# Patient Record
Sex: FEMALE | Race: WHITE | Employment: UNEMPLOYED | URBAN - METROPOLITAN AREA
[De-identification: names, ages, dates, MRNs, and addresses within clinical notes are randomized per-mention and may not be internally consistent; named-entity substitution may affect disease eponyms.]

---

## 2018-03-03 ENCOUNTER — HOSPITAL ENCOUNTER (OUTPATIENT)
Dept: MAMMOGRAPHY | Age: 53
Discharge: HOME | End: 2018-03-03
Attending: FAMILY MEDICINE | Admitting: FAMILY MEDICINE

## 2018-03-03 DIAGNOSIS — Z12.31 ENCOUNTER FOR SCREENING MAMMOGRAM FOR BREAST CANCER: ICD-10-CM

## 2018-10-14 ENCOUNTER — APPOINTMENT (OUTPATIENT)
Dept: CT IMAGING | Age: 53
DRG: 005 | End: 2018-10-14
Payer: COMMERCIAL

## 2018-10-14 ENCOUNTER — APPOINTMENT (OUTPATIENT)
Dept: GENERAL RADIOLOGY | Age: 53
DRG: 005 | End: 2018-10-14
Payer: COMMERCIAL

## 2018-10-14 ENCOUNTER — HOSPITAL ENCOUNTER (INPATIENT)
Age: 53
LOS: 16 days | Discharge: NURSING, REHAB OR LONG-TERM CARE | DRG: 005 | End: 2018-10-30
Attending: EMERGENCY MEDICINE | Admitting: INTERNAL MEDICINE
Payer: COMMERCIAL

## 2018-10-14 DIAGNOSIS — A41.9 SEPTIC SHOCK (HCC): ICD-10-CM

## 2018-10-14 DIAGNOSIS — I46.9 CARDIAC ARREST (HCC): ICD-10-CM

## 2018-10-14 DIAGNOSIS — J96.91 RESPIRATORY FAILURE WITH HYPOXIA AND HYPERCAPNIA, UNSPECIFIED CHRONICITY (HCC): Primary | ICD-10-CM

## 2018-10-14 DIAGNOSIS — J96.92 RESPIRATORY FAILURE WITH HYPOXIA AND HYPERCAPNIA, UNSPECIFIED CHRONICITY (HCC): Primary | ICD-10-CM

## 2018-10-14 DIAGNOSIS — R65.21 SEPTIC SHOCK (HCC): ICD-10-CM

## 2018-10-14 PROBLEM — R09.2 RESPIRATORY ARREST (HCC): Status: ACTIVE | Noted: 2018-10-14

## 2018-10-14 PROBLEM — I49.01 CARDIAC ARREST WITH VENTRICULAR FIBRILLATION (HCC): Status: ACTIVE | Noted: 2018-10-14

## 2018-10-14 PROBLEM — R09.2 RESPIRATORY ARREST BEFORE CARDIAC ARREST (HCC): Status: ACTIVE | Noted: 2018-10-14

## 2018-10-14 PROBLEM — I47.20 V-TACH: Status: ACTIVE | Noted: 2018-10-14

## 2018-10-14 LAB
A/G RATIO: 1.3 (ref 1.1–2.2)
ALBUMIN SERPL-MCNC: 4.2 G/DL (ref 3.4–5)
ALP BLD-CCNC: 87 U/L (ref 40–129)
ALT SERPL-CCNC: 46 U/L (ref 10–40)
AMORPHOUS: ABNORMAL /HPF
AMPHETAMINE SCREEN, URINE: NORMAL
ANION GAP SERPL CALCULATED.3IONS-SCNC: 18 MMOL/L (ref 3–16)
AST SERPL-CCNC: 72 U/L (ref 15–37)
BACTERIA: ABNORMAL /HPF
BANDED NEUTROPHILS RELATIVE PERCENT: 8 % (ref 0–7)
BARBITURATE SCREEN URINE: NORMAL
BASE EXCESS ARTERIAL: -2 (ref -3–3)
BASE EXCESS ARTERIAL: -7.2 MMOL/L (ref -3–3)
BASE EXCESS VENOUS: -9.8 MMOL/L (ref -3–3)
BASOPHILS ABSOLUTE: 0 K/UL (ref 0–0.2)
BASOPHILS RELATIVE PERCENT: 0 %
BENZODIAZEPINE SCREEN, URINE: NORMAL
BILIRUB SERPL-MCNC: 0.3 MG/DL (ref 0–1)
BILIRUBIN URINE: NEGATIVE
BLOOD, URINE: ABNORMAL
BUN BLDV-MCNC: 13 MG/DL (ref 7–20)
CALCIUM SERPL-MCNC: 9.4 MG/DL (ref 8.3–10.6)
CANNABINOID SCREEN URINE: NORMAL
CARBOXYHEMOGLOBIN ARTERIAL: 2.3 % (ref 0–1.5)
CARBOXYHEMOGLOBIN: 6.5 % (ref 0–1.5)
CASTS: ABNORMAL /LPF
CHLORIDE BLD-SCNC: 103 MMOL/L (ref 99–110)
CLARITY: CLEAR
CO2: 19 MMOL/L (ref 21–32)
COCAINE METABOLITE SCREEN URINE: NORMAL
COLOR: YELLOW
CREAT SERPL-MCNC: 0.7 MG/DL (ref 0.6–1.1)
EKG ATRIAL RATE: 137 BPM
EKG ATRIAL RATE: 99 BPM
EKG DIAGNOSIS: NORMAL
EKG DIAGNOSIS: NORMAL
EKG P AXIS: 47 DEGREES
EKG P AXIS: 69 DEGREES
EKG P-R INTERVAL: 150 MS
EKG P-R INTERVAL: 164 MS
EKG Q-T INTERVAL: 274 MS
EKG Q-T INTERVAL: 382 MS
EKG QRS DURATION: 108 MS
EKG QRS DURATION: 94 MS
EKG QTC CALCULATION (BAZETT): 413 MS
EKG QTC CALCULATION (BAZETT): 490 MS
EKG R AXIS: -50 DEGREES
EKG R AXIS: 115 DEGREES
EKG T AXIS: 94 DEGREES
EKG T AXIS: 94 DEGREES
EKG VENTRICULAR RATE: 137 BPM
EKG VENTRICULAR RATE: 99 BPM
EOSINOPHILS ABSOLUTE: 0.5 K/UL (ref 0–0.6)
EOSINOPHILS RELATIVE PERCENT: 2 %
ETHANOL: NORMAL MG/DL (ref 0–0.08)
GFR AFRICAN AMERICAN: >60
GFR NON-AFRICAN AMERICAN: >60
GLOBULIN: 3.2 G/DL
GLUCOSE BLD-MCNC: 110 MG/DL (ref 70–99)
GLUCOSE BLD-MCNC: 126 MG/DL (ref 70–99)
GLUCOSE BLD-MCNC: 227 MG/DL (ref 70–99)
GLUCOSE URINE: 250 MG/DL
HCO3 ARTERIAL: 21 MMOL/L (ref 21–29)
HCO3 ARTERIAL: 23.9 MMOL/L (ref 21–29)
HCO3 VENOUS: 19.3 MMOL/L (ref 23–29)
HCT VFR BLD CALC: 44.5 % (ref 36–48)
HEMATOLOGY PATH CONSULT: YES
HEMOGLOBIN, ART, EXTENDED: 14.5 G/DL (ref 12–16)
HEMOGLOBIN: 15 G/DL (ref 12–16)
INR BLD: 0.95 (ref 0.86–1.14)
KETONES, URINE: NEGATIVE MG/DL
LACTIC ACID, SEPSIS: 1.9 MMOL/L (ref 0.4–1.9)
LACTIC ACID, SEPSIS: 2.1 MMOL/L (ref 0.4–1.9)
LACTIC ACID: 5.2 MMOL/L (ref 0.4–2)
LEUKOCYTE ESTERASE, URINE: NEGATIVE
LYMPHOCYTES ABSOLUTE: 10.3 K/UL (ref 1–5.1)
LYMPHOCYTES RELATIVE PERCENT: 41 %
Lab: NORMAL
MCH RBC QN AUTO: 33.5 PG (ref 26–34)
MCHC RBC AUTO-ENTMCNC: 33.7 G/DL (ref 31–36)
MCV RBC AUTO: 99.4 FL (ref 80–100)
METAMYELOCYTES RELATIVE PERCENT: 1 %
METHADONE SCREEN, URINE: NORMAL
METHEMOGLOBIN ARTERIAL: 0.6 %
METHEMOGLOBIN VENOUS: 0.4 %
MICROSCOPIC EXAMINATION: YES
MONOCYTES ABSOLUTE: 0.8 K/UL (ref 0–1.3)
MONOCYTES RELATIVE PERCENT: 3 %
NEUTROPHILS ABSOLUTE: 13.6 K/UL (ref 1.7–7.7)
NEUTROPHILS RELATIVE PERCENT: 45 %
NITRITE, URINE: NEGATIVE
O2 CONTENT ARTERIAL: 19 ML/DL
O2 CONTENT, VEN: 21 VOL %
O2 SAT, ARTERIAL: 95.9 %
O2 SAT, ARTERIAL: 96 % (ref 93–100)
O2 SAT, VEN: 98 %
O2 THERAPY: ABNORMAL
O2 THERAPY: ABNORMAL
OPIATE SCREEN URINE: NORMAL
OXYCODONE URINE: NORMAL
PCO2 ARTERIAL: 47.7 MM HG (ref 35–45)
PCO2 ARTERIAL: 53 MMHG (ref 35–45)
PCO2, VEN: 54.7 MMHG (ref 40–50)
PDW BLD-RTO: 14.5 % (ref 12.4–15.4)
PERFORMED ON: ABNORMAL
PERFORMED ON: ABNORMAL
PH ARTERIAL: 7.22 (ref 7.35–7.45)
PH ARTERIAL: 7.31 (ref 7.35–7.45)
PH UA: 6
PH UA: 6
PH VENOUS: 7.17 (ref 7.35–7.45)
PHENCYCLIDINE SCREEN URINE: NORMAL
PLATELET # BLD: 348 K/UL (ref 135–450)
PMV BLD AUTO: 8.9 FL (ref 5–10.5)
PO2 ARTERIAL: 87.2 MMHG (ref 75–108)
PO2 ARTERIAL: 90.7 MM HG (ref 75–108)
PO2, VEN: 133.3 MMHG (ref 25–40)
POC SAMPLE TYPE: ABNORMAL
POTASSIUM SERPL-SCNC: 4.2 MMOL/L (ref 3.5–5.1)
PROPOXYPHENE SCREEN: NORMAL
PROTEIN UA: >=300 MG/DL
PROTHROMBIN TIME: 10.8 SEC (ref 9.8–13)
RBC # BLD: 4.48 M/UL (ref 4–5.2)
RBC # BLD: NORMAL 10*6/UL
RBC UA: ABNORMAL /HPF (ref 0–2)
SODIUM BLD-SCNC: 140 MMOL/L (ref 136–145)
SPECIFIC GRAVITY UA: >=1.03
TCO2 ARTERIAL: 22.6 MMOL/L
TCO2 ARTERIAL: 25 MMOL/L
TCO2 CALC VENOUS: 21 MMOL/L
TOTAL PROTEIN: 7.4 G/DL (ref 6.4–8.2)
TROPONIN: <0.01 NG/ML
URINE REFLEX TO CULTURE: ABNORMAL
URINE TYPE: ABNORMAL
UROBILINOGEN, URINE: 0.2 E.U./DL
WBC # BLD: 25.2 K/UL (ref 4–11)
WBC UA: ABNORMAL /HPF (ref 0–5)

## 2018-10-14 PROCEDURE — 36620 INSERTION CATHETER ARTERY: CPT

## 2018-10-14 PROCEDURE — 80307 DRUG TEST PRSMV CHEM ANLYZR: CPT

## 2018-10-14 PROCEDURE — 31500 INSERT EMERGENCY AIRWAY: CPT

## 2018-10-14 PROCEDURE — 6370000000 HC RX 637 (ALT 250 FOR IP): Performed by: INTERNAL MEDICINE

## 2018-10-14 PROCEDURE — 2500000003 HC RX 250 WO HCPCS: Performed by: INTERNAL MEDICINE

## 2018-10-14 PROCEDURE — 36415 COLL VENOUS BLD VENIPUNCTURE: CPT

## 2018-10-14 PROCEDURE — 94640 AIRWAY INHALATION TREATMENT: CPT

## 2018-10-14 PROCEDURE — 94002 VENT MGMT INPAT INIT DAY: CPT

## 2018-10-14 PROCEDURE — 82803 BLOOD GASES ANY COMBINATION: CPT

## 2018-10-14 PROCEDURE — 87040 BLOOD CULTURE FOR BACTERIA: CPT

## 2018-10-14 PROCEDURE — 2580000003 HC RX 258: Performed by: EMERGENCY MEDICINE

## 2018-10-14 PROCEDURE — 71045 X-RAY EXAM CHEST 1 VIEW: CPT

## 2018-10-14 PROCEDURE — 85025 COMPLETE CBC W/AUTO DIFF WBC: CPT

## 2018-10-14 PROCEDURE — 2700000000 HC OXYGEN THERAPY PER DAY

## 2018-10-14 PROCEDURE — 6360000002 HC RX W HCPCS: Performed by: EMERGENCY MEDICINE

## 2018-10-14 PROCEDURE — 93005 ELECTROCARDIOGRAM TRACING: CPT | Performed by: INTERNAL MEDICINE

## 2018-10-14 PROCEDURE — 2580000003 HC RX 258: Performed by: INTERNAL MEDICINE

## 2018-10-14 PROCEDURE — 99291 CRITICAL CARE FIRST HOUR: CPT | Performed by: INTERNAL MEDICINE

## 2018-10-14 PROCEDURE — 94770 HC ETCO2 MONITOR DAILY: CPT

## 2018-10-14 PROCEDURE — 84484 ASSAY OF TROPONIN QUANT: CPT

## 2018-10-14 PROCEDURE — 82947 ASSAY GLUCOSE BLOOD QUANT: CPT

## 2018-10-14 PROCEDURE — 80053 COMPREHEN METABOLIC PANEL: CPT

## 2018-10-14 PROCEDURE — 99255 IP/OBS CONSLTJ NEW/EST HI 80: CPT | Performed by: INTERNAL MEDICINE

## 2018-10-14 PROCEDURE — 36620 INSERTION CATHETER ARTERY: CPT | Performed by: INTERNAL MEDICINE

## 2018-10-14 PROCEDURE — 74018 RADEX ABDOMEN 1 VIEW: CPT

## 2018-10-14 PROCEDURE — 85610 PROTHROMBIN TIME: CPT

## 2018-10-14 PROCEDURE — 70450 CT HEAD/BRAIN W/O DYE: CPT

## 2018-10-14 PROCEDURE — 93010 ELECTROCARDIOGRAM REPORT: CPT | Performed by: INTERNAL MEDICINE

## 2018-10-14 PROCEDURE — G0480 DRUG TEST DEF 1-7 CLASSES: HCPCS

## 2018-10-14 PROCEDURE — 6360000002 HC RX W HCPCS: Performed by: INTERNAL MEDICINE

## 2018-10-14 PROCEDURE — 72125 CT NECK SPINE W/O DYE: CPT

## 2018-10-14 PROCEDURE — 99285 EMERGENCY DEPT VISIT HI MDM: CPT

## 2018-10-14 PROCEDURE — 36556 INSERT NON-TUNNEL CV CATH: CPT | Performed by: INTERNAL MEDICINE

## 2018-10-14 PROCEDURE — 94761 N-INVAS EAR/PLS OXIMETRY MLT: CPT

## 2018-10-14 PROCEDURE — 94750 HC PULMONARY COMPLIANCE STUDY: CPT

## 2018-10-14 PROCEDURE — 36556 INSERT NON-TUNNEL CV CATH: CPT

## 2018-10-14 PROCEDURE — 2000000000 HC ICU R&B

## 2018-10-14 PROCEDURE — 81001 URINALYSIS AUTO W/SCOPE: CPT

## 2018-10-14 PROCEDURE — 02HV33Z INSERTION OF INFUSION DEVICE INTO SUPERIOR VENA CAVA, PERCUTANEOUS APPROACH: ICD-10-PCS | Performed by: INTERNAL MEDICINE

## 2018-10-14 PROCEDURE — 51702 INSERT TEMP BLADDER CATH: CPT

## 2018-10-14 PROCEDURE — 0BH17EZ INSERTION OF ENDOTRACHEAL AIRWAY INTO TRACHEA, VIA NATURAL OR ARTIFICIAL OPENING: ICD-10-PCS | Performed by: EMERGENCY MEDICINE

## 2018-10-14 PROCEDURE — 2580000003 HC RX 258

## 2018-10-14 PROCEDURE — 83605 ASSAY OF LACTIC ACID: CPT

## 2018-10-14 PROCEDURE — 5A1955Z RESPIRATORY VENTILATION, GREATER THAN 96 CONSECUTIVE HOURS: ICD-10-PCS | Performed by: INTERNAL MEDICINE

## 2018-10-14 RX ORDER — POTASSIUM CHLORIDE 20 MEQ/1
40 TABLET, EXTENDED RELEASE ORAL PRN
Status: DISCONTINUED | OUTPATIENT
Start: 2018-10-14 | End: 2018-10-30 | Stop reason: HOSPADM

## 2018-10-14 RX ORDER — PROPOFOL 10 MG/ML
10 INJECTION, EMULSION INTRAVENOUS
Status: DISCONTINUED | OUTPATIENT
Start: 2018-10-14 | End: 2018-10-14

## 2018-10-14 RX ORDER — SODIUM CHLORIDE 9 MG/ML
INJECTION, SOLUTION INTRAVENOUS
Status: COMPLETED
Start: 2018-10-14 | End: 2018-10-14

## 2018-10-14 RX ORDER — MAGNESIUM SULFATE 1 G/100ML
1 INJECTION INTRAVENOUS PRN
Status: DISCONTINUED | OUTPATIENT
Start: 2018-10-14 | End: 2018-10-30 | Stop reason: HOSPADM

## 2018-10-14 RX ORDER — FENTANYL CITRATE 50 UG/ML
100 INJECTION, SOLUTION INTRAMUSCULAR; INTRAVENOUS ONCE
Status: COMPLETED | OUTPATIENT
Start: 2018-10-14 | End: 2018-10-14

## 2018-10-14 RX ORDER — SODIUM CHLORIDE 0.9 % (FLUSH) 0.9 %
10 SYRINGE (ML) INJECTION PRN
Status: DISCONTINUED | OUTPATIENT
Start: 2018-10-14 | End: 2018-10-14

## 2018-10-14 RX ORDER — ONDANSETRON 2 MG/ML
4 INJECTION INTRAMUSCULAR; INTRAVENOUS EVERY 6 HOURS PRN
Status: DISCONTINUED | OUTPATIENT
Start: 2018-10-14 | End: 2018-10-30 | Stop reason: HOSPADM

## 2018-10-14 RX ORDER — SODIUM CHLORIDE 0.9 % (FLUSH) 0.9 %
10 SYRINGE (ML) INJECTION PRN
Status: DISCONTINUED | OUTPATIENT
Start: 2018-10-14 | End: 2018-10-20 | Stop reason: SDUPTHER

## 2018-10-14 RX ORDER — PROPOFOL 10 MG/ML
10 INJECTION, EMULSION INTRAVENOUS
Status: DISCONTINUED | OUTPATIENT
Start: 2018-10-14 | End: 2018-10-17

## 2018-10-14 RX ORDER — IPRATROPIUM BROMIDE AND ALBUTEROL SULFATE 2.5; .5 MG/3ML; MG/3ML
1 SOLUTION RESPIRATORY (INHALATION) 4 TIMES DAILY
Status: DISCONTINUED | OUTPATIENT
Start: 2018-10-14 | End: 2018-10-17

## 2018-10-14 RX ORDER — MIDAZOLAM HYDROCHLORIDE 1 MG/ML
5 INJECTION INTRAMUSCULAR; INTRAVENOUS ONCE
Status: DISCONTINUED | OUTPATIENT
Start: 2018-10-14 | End: 2018-10-14 | Stop reason: CLARIF

## 2018-10-14 RX ORDER — SODIUM CHLORIDE 0.9 % (FLUSH) 0.9 %
10 SYRINGE (ML) INJECTION EVERY 12 HOURS SCHEDULED
Status: DISCONTINUED | OUTPATIENT
Start: 2018-10-14 | End: 2018-10-14

## 2018-10-14 RX ORDER — SODIUM CHLORIDE 9 MG/ML
INJECTION, SOLUTION INTRAVENOUS
Status: DISPENSED
Start: 2018-10-14 | End: 2018-10-14

## 2018-10-14 RX ORDER — SODIUM CHLORIDE 0.9 % (FLUSH) 0.9 %
10 SYRINGE (ML) INJECTION EVERY 12 HOURS SCHEDULED
Status: DISCONTINUED | OUTPATIENT
Start: 2018-10-14 | End: 2018-10-20 | Stop reason: SDUPTHER

## 2018-10-14 RX ORDER — 0.9 % SODIUM CHLORIDE 0.9 %
30 INTRAVENOUS SOLUTION INTRAVENOUS ONCE
Status: COMPLETED | OUTPATIENT
Start: 2018-10-14 | End: 2018-10-14

## 2018-10-14 RX ORDER — METHYLPREDNISOLONE SODIUM SUCCINATE 40 MG/ML
40 INJECTION, POWDER, LYOPHILIZED, FOR SOLUTION INTRAMUSCULAR; INTRAVENOUS DAILY
Status: DISCONTINUED | OUTPATIENT
Start: 2018-10-14 | End: 2018-10-15

## 2018-10-14 RX ORDER — CHLORHEXIDINE GLUCONATE 0.12 MG/ML
15 RINSE ORAL 2 TIMES DAILY
Status: DISCONTINUED | OUTPATIENT
Start: 2018-10-14 | End: 2018-10-17

## 2018-10-14 RX ORDER — POTASSIUM CHLORIDE 7.45 MG/ML
10 INJECTION INTRAVENOUS PRN
Status: DISCONTINUED | OUTPATIENT
Start: 2018-10-14 | End: 2018-10-30 | Stop reason: HOSPADM

## 2018-10-14 RX ORDER — POTASSIUM CHLORIDE 20MEQ/15ML
40 LIQUID (ML) ORAL PRN
Status: DISCONTINUED | OUTPATIENT
Start: 2018-10-14 | End: 2018-10-30 | Stop reason: HOSPADM

## 2018-10-14 RX ADMIN — VANCOMYCIN HYDROCHLORIDE 1.5 G: 10 INJECTION, POWDER, LYOPHILIZED, FOR SOLUTION INTRAVENOUS at 05:30

## 2018-10-14 RX ADMIN — SODIUM CHLORIDE 250 ML: 9 INJECTION, SOLUTION INTRAVENOUS at 07:43

## 2018-10-14 RX ADMIN — FENTANYL CITRATE 25 MCG/HR: 50 INJECTION INTRAVENOUS at 10:01

## 2018-10-14 RX ADMIN — CHLORHEXIDINE GLUCONATE 15 ML: 1.2 RINSE ORAL at 09:22

## 2018-10-14 RX ADMIN — ENOXAPARIN SODIUM 40 MG: 40 INJECTION SUBCUTANEOUS at 07:42

## 2018-10-14 RX ADMIN — NOREPINEPHRINE BITARTRATE 4 MCG/MIN: 1 INJECTION INTRAVENOUS at 19:20

## 2018-10-14 RX ADMIN — PROPOFOL 40 MCG/KG/MIN: 10 INJECTION, EMULSION INTRAVENOUS at 14:42

## 2018-10-14 RX ADMIN — MUPIROCIN: 20 OINTMENT TOPICAL at 20:17

## 2018-10-14 RX ADMIN — PROPOFOL 30 MCG/KG/MIN: 10 INJECTION, EMULSION INTRAVENOUS at 06:15

## 2018-10-14 RX ADMIN — DOXYCYCLINE 100 MG: 100 INJECTION, POWDER, LYOPHILIZED, FOR SOLUTION INTRAVENOUS at 08:37

## 2018-10-14 RX ADMIN — PROPOFOL 50 MCG/KG/MIN: 10 INJECTION, EMULSION INTRAVENOUS at 21:24

## 2018-10-14 RX ADMIN — PROPOFOL 50 MCG/KG/MIN: 10 INJECTION, EMULSION INTRAVENOUS at 21:22

## 2018-10-14 RX ADMIN — Medication 10 ML: at 20:17

## 2018-10-14 RX ADMIN — Medication 10 ML: at 07:42

## 2018-10-14 RX ADMIN — MIDAZOLAM 6 MG/HR: 5 INJECTION INTRAMUSCULAR; INTRAVENOUS at 05:33

## 2018-10-14 RX ADMIN — CHLORHEXIDINE GLUCONATE 15 ML: 1.2 RINSE ORAL at 20:17

## 2018-10-14 RX ADMIN — SODIUM CHLORIDE 2994 ML: 9 INJECTION, SOLUTION INTRAVENOUS at 05:10

## 2018-10-14 RX ADMIN — IPRATROPIUM BROMIDE AND ALBUTEROL SULFATE 1 AMPULE: .5; 3 SOLUTION RESPIRATORY (INHALATION) at 15:59

## 2018-10-14 RX ADMIN — MUPIROCIN: 20 OINTMENT TOPICAL at 09:06

## 2018-10-14 RX ADMIN — IPRATROPIUM BROMIDE AND ALBUTEROL SULFATE 1 AMPULE: .5; 3 SOLUTION RESPIRATORY (INHALATION) at 20:50

## 2018-10-14 RX ADMIN — MIDAZOLAM 2 MG/HR: 5 INJECTION INTRAMUSCULAR; INTRAVENOUS at 05:01

## 2018-10-14 RX ADMIN — PIPERACILLIN SODIUM,TAZOBACTAM SODIUM 3.38 G: 3; .375 INJECTION, POWDER, FOR SOLUTION INTRAVENOUS at 07:42

## 2018-10-14 RX ADMIN — METHYLPREDNISOLONE SODIUM SUCCINATE 40 MG: 40 INJECTION, POWDER, FOR SOLUTION INTRAMUSCULAR; INTRAVENOUS at 09:51

## 2018-10-14 RX ADMIN — FENTANYL CITRATE 100 MCG/HR: 50 INJECTION INTRAVENOUS at 20:18

## 2018-10-14 RX ADMIN — PROPOFOL 45 MCG/KG/MIN: 10 INJECTION, EMULSION INTRAVENOUS at 10:33

## 2018-10-14 RX ADMIN — FENTANYL CITRATE 100 MCG: 50 INJECTION, SOLUTION INTRAMUSCULAR; INTRAVENOUS at 04:10

## 2018-10-14 RX ADMIN — IPRATROPIUM BROMIDE AND ALBUTEROL SULFATE 1 AMPULE: .5; 3 SOLUTION RESPIRATORY (INHALATION) at 11:58

## 2018-10-14 RX ADMIN — PROPOFOL 45 MCG/KG/MIN: 10 INJECTION, EMULSION INTRAVENOUS at 18:00

## 2018-10-14 RX ADMIN — DOXYCYCLINE 100 MG: 100 INJECTION, POWDER, LYOPHILIZED, FOR SOLUTION INTRAVENOUS at 20:16

## 2018-10-14 ASSESSMENT — PULMONARY FUNCTION TESTS
PIF_VALUE: 23
PIF_VALUE: 40
PIF_VALUE: 22
PIF_VALUE: 19
PIF_VALUE: 18
PIF_VALUE: 22
PIF_VALUE: 18
PIF_VALUE: 20
PIF_VALUE: 21
PIF_VALUE: 20
PIF_VALUE: 21
PIF_VALUE: 14
PIF_VALUE: 23
PIF_VALUE: 22
PIF_VALUE: 20
PIF_VALUE: 20

## 2018-10-14 ASSESSMENT — PAIN SCALES - GENERAL
PAINLEVEL_OUTOF10: 0

## 2018-10-14 NOTE — CONSULTS
PULMONARY AND CRITICAL CARE INPATIENT CONSULTATION      10/14/2018    Patient Name:  Andi Erwin       1965       Evaluation was requested by Dr. Sara Schneider regarding: intubated, respiratory arrest/V. fib arrest.    HPI: Patient is a 46 y.o. female who was admitted with respiratory arrest and V. fib arrest.     Chepe Gardner is a 39-year-old female living with her  and family. She has 3 children. The patient is a homemaker. She is a heavy smoker, smokes up to 2 PPD, did quit for 10 years in between. She does not drink alcohol. She has had major back surgery with rods in her spine for more than 20 years, is on pain medication. She has limited ambulation due to her back issues, but does drive independently, goes where she wants to. She also takes 8-10 Tylenol tablets a day in addition to her pain medications. She has had long-standing asthma, uses inhalers through her PCP, has not seen a pulmonologist. She has had ER and urgent care visits for bronchodilator therapy, has never been hospitalized for asthma. She does not have allergic rhinitis or GERD. She also has hypertension and hyperlipidemia. The patient has high levels of anxiety, with trichotillomania. She occasionally has diarrhea alternating with constipation. There is no history of alcohol or illicit drug use. There has been no weight loss, no  complaints according to her . The patient was in her usual state of health, though they could have been slightly increased shortness of breath. Her son did hear her using her inhaler, possibly twice. She is compliant with her MDIs. She does inform her family and her  about issues she that she has with her health. The patient woke her  up last night, and then fell down. He noted she was breathing. The squad was called. She did have a pulse in the squad arrived. She was having respiratory distress. A Lacho airway was being attempted when she lost her pulse.

## 2018-10-14 NOTE — H&P
every 6 hours as needed. Yes Historical Provider, MD   loratadine (CLARITIN) 10 MG tablet Take 10 mg by mouth daily. Yes Historical Provider, MD   amphetamine-dextroamphetamine (ADDERALL XR) 10 MG XR capsule Take 10 mg by mouth every morning. Yes Historical Provider, MD   clonazepam (KLONOPIN) 0.5 MG tablet Take 1 mg by mouth 3 times daily as needed. 1/18/11  Yes Historical Provider, MD   lisinopril (PRINIVIL;ZESTRIL) 20 MG tablet Take 20 mg by mouth daily. Yes Historical Provider, MD   simvastatin (ZOCOR) 20 MG tablet Take 20 mg by mouth nightly. Yes Historical Provider, MD   diphenoxylate-atropine (LOMOTIL) 2.5-0.025 MG per tablet Take 1 tablet by mouth 4 times daily as needed. Yes Historical Provider, MD   fluticasone-salmeterol (ADVAIR HFA) 230-21 MCG/ACT inhaler Inhale 2 puffs into the lungs 2 times daily. Yes Historical Provider, MD   albuterol (PROVENTIL HFA;VENTOLIN HFA) 108 (90 BASE) MCG/ACT inhaler Inhale 2 puffs into the lungs every 6 hours as needed. Yes Historical Provider, MD   NONFORMULARY Take 1 tablet by mouth daily \"I take a large white blood pressure pill\"    Historical Provider, MD   oxyCODONE-acetaminophen (PERCOCET)  MG per tablet Take 1 tablet by mouth every 4 hours as needed for Pain . Historical Provider, MD   UNKNOWN TO PATIENT Indications: takes med for depression unsure of name (topamil?)    Historical Provider, MD   paroxetine (PAXIL) 20 MG tablet Take 20 mg by mouth 2 times daily. 12/11/10   Historical Provider, MD       Allergies:  Aspirin; Nsaids; and Penicillins    Social History:      The patient currently lives at home    TOBACCO:   reports that she has been smoking Cigarettes. She has a 0.64 pack-year smoking history. She has never used smokeless tobacco.  ETOH:   reports that she does not drink alcohol. Family History:       Reviewed in detail and negative for DM, CAD, Cancer, CVA. Positive as follows:    No family history on file.     REVIEW OF [address] NEGATIVE 02/14/2011       Radiology:     EKG:  I have reviewed the EKG with the following interpretation: sinus tach, rate of 137,  Left axis, st/tw abn in lateral leads concerning for ischemia, nonspecific intraventricular conduction delay    XR CHEST PORTABLE   Final Result   Cardiomegaly with mild vascular indistinctness, either due to low lung   volumes or mild edema. Subtle retrocardiac opacity remains on the left         CT CERVICAL SPINE WO CONTRAST   Preliminary Result   Spondylosis with no definite fracture. CT HEAD WO CONTRAST   Preliminary Result   No acute intracranial abnormality. XR ABDOMEN (KUB) (SINGLE AP VIEW)   Final Result   Chest: The endotracheal tube tip is 1-2 cm above the rojas. The tube could   be withdrawn 1 cm. Abdomen: The enteric tube is in good position in the stomach which is   distended. XR CHEST PORTABLE   Final Result   Chest: The endotracheal tube tip is 1-2 cm above the rojas. The tube could   be withdrawn 1 cm. Abdomen: The enteric tube is in good position in the stomach which is   distended.          XR CHEST PORTABLE    (Results Pending)       ASSESSMENT:    Active Hospital Problems    Diagnosis Date Noted    Asthma [J45.909]      Priority: Low    Respiratory arrest ([address] [R09.2] 10/14/2018    V-tach ([address] [I47.2] 10/14/2018    Sepsis ([address] [A41.9] 10/14/2018    Respiratory arrest before cardiac arrest ([address] [I46.9, R09.2] 10/14/2018    Cardiac arrest with ventricular fibrillation ([address] [I46.9, I49.01] 10/14/2018    Respiratory failure with hypoxia and hypercapnia (HCC) [J96.91, J96.92]          PLAN:    Acute resp failure- unclear etiology, possibly from asthma or cardiac etiology  -intubated, mgmt per pulm/cc apprec recs  -vanc/zosyn x 1 given  -iv rocephin/doxycycline started for possible resp infection, adjust as needed    Cardiac arrest- possibly from hypoxia, but pt did also apparently have vfib, ekg did note some

## 2018-10-14 NOTE — PLAN OF CARE
Problem: Restraint Use - Nonviolent/Non-Self-Destructive Behavior:  Goal: Absence of restraint indications  Absence of restraint indications   Outcome: Ongoing  Patient still demonstrating indications of the need to be restrained at this time. Visual safety checks performed every hour, and restraint monitoring performed every two hours. Patient has no signs of injuries from restraints at this time. Educated pt on the need for the restraints, no evidence of learning. Will continue to monitor. Problem: Falls - Risk of:  Goal: Will remain free from falls  Will remain free from falls   Outcome: Ongoing  Fall risk assessment complete, fall precautions in place. Fall visuals posted, bed alarm on, bed in lowest position with wheels locked. Patient has been free of falls this shift, will continue to monitor. Problem: Risk for Impaired Skin Integrity  Goal: Tissue integrity - skin and mucous membranes  Structural intactness and normal physiological function of skin and  mucous membranes. Outcome: Ongoing  Skin assessment complete. Pt at risk for skin breakdown. See Luis Antonio score. Pt remains on bedrest. Unable to reposition self in bed. Heels elevated off bed. Will continue to turn and reposition patient every two hours and as needed. Will continue to keep patient clean and dry, applying skin care cream as needed. Pillows used for positioning. Will continue to monitor and assess for skin breakdown.

## 2018-10-14 NOTE — PROGRESS NOTES
Reassessment complete and documented on flowsheets, unchanged from previous. VSS. Repositioned for comfort. Will continue to monitor.

## 2018-10-14 NOTE — PROGRESS NOTES
4 Eyes Skin Assessment     The patient is being assess for   Shift Handoff    I agree that 2 RN's have performed a thorough Head to Toe Skin Assessment on the patient. ALL assessment sites listed below have been assessed. Areas assessed by both nurses:   [x]   Head, Face, and Ears   [x]   Shoulders, Back, and Chest, Abdomen  [x]   Arms, Elbows, and Hands   [x]   Coccyx, Sacrum, and Ischium  [x]   Legs, Feet, and Heels        **SHARE this note so that the co-signing nurse is able to place an eSignature**    Co-signer eSignature: Electronically signed by Issa Ireland RN on 10/14/18 at 8:53 AM    Does the Patient have Skin Breakdown?   No          Luis Antonio Prevention initiated:  Yes   Wound Care Orders initiated:  NA      [facility] nurse consulted for Pressure Injury (Stage 3,4, Unstageable, DTI, NWPT, Complex wounds)and New or Established Ostomies:  NA      Primary Nurse eSignature: TEJA CHURCHILL

## 2018-10-15 ENCOUNTER — APPOINTMENT (OUTPATIENT)
Dept: GENERAL RADIOLOGY | Age: 53
DRG: 005 | End: 2018-10-15
Payer: COMMERCIAL

## 2018-10-15 ENCOUNTER — APPOINTMENT (OUTPATIENT)
Dept: MRI IMAGING | Age: 53
DRG: 005 | End: 2018-10-15
Payer: COMMERCIAL

## 2018-10-15 LAB
A/G RATIO: 1.4 (ref 1.1–2.2)
ALBUMIN SERPL-MCNC: 3.4 G/DL (ref 3.4–5)
ALP BLD-CCNC: 59 U/L (ref 40–129)
ALT SERPL-CCNC: 32 U/L (ref 10–40)
ANION GAP SERPL CALCULATED.3IONS-SCNC: 10 MMOL/L (ref 3–16)
AST SERPL-CCNC: 26 U/L (ref 15–37)
BANDED NEUTROPHILS RELATIVE PERCENT: 1 % (ref 0–7)
BASE EXCESS ARTERIAL: -2 (ref -3–3)
BASE EXCESS ARTERIAL: -2 (ref -3–3)
BASE EXCESS ARTERIAL: -2.6 MMOL/L (ref -3–3)
BASOPHILS ABSOLUTE: 0 K/UL (ref 0–0.2)
BASOPHILS RELATIVE PERCENT: 0 %
BILIRUB SERPL-MCNC: <0.2 MG/DL (ref 0–1)
BUN BLDV-MCNC: 17 MG/DL (ref 7–20)
CALCIUM SERPL-MCNC: 8.6 MG/DL (ref 8.3–10.6)
CARBOXYHEMOGLOBIN ARTERIAL: 0.1 % (ref 0–1.5)
CHLORIDE BLD-SCNC: 108 MMOL/L (ref 99–110)
CO2: 23 MMOL/L (ref 21–32)
CREAT SERPL-MCNC: 0.7 MG/DL (ref 0.6–1.1)
EOSINOPHILS ABSOLUTE: 0 K/UL (ref 0–0.6)
EOSINOPHILS RELATIVE PERCENT: 0 %
GFR AFRICAN AMERICAN: >60
GFR NON-AFRICAN AMERICAN: >60
GLOBULIN: 2.5 G/DL
GLUCOSE BLD-MCNC: 109 MG/DL (ref 70–99)
GLUCOSE BLD-MCNC: 134 MG/DL (ref 70–99)
GLUCOSE BLD-MCNC: 163 MG/DL (ref 70–99)
HCO3 ARTERIAL: 23.8 MMOL/L (ref 21–29)
HCO3 ARTERIAL: 24 MMOL/L (ref 21–29)
HCO3 ARTERIAL: 25 MMOL/L (ref 21–29)
HCT VFR BLD CALC: 37.7 % (ref 36–48)
HEMATOLOGY PATH CONSULT: NORMAL
HEMOGLOBIN, ART, EXTENDED: 12.9 G/DL (ref 12–16)
HEMOGLOBIN: 12.6 G/DL (ref 12–16)
LV EF: 40 %
LVEF MODALITY: NORMAL
LYMPHOCYTES ABSOLUTE: 2.1 K/UL (ref 1–5.1)
LYMPHOCYTES RELATIVE PERCENT: 11 %
MCH RBC QN AUTO: 33.5 PG (ref 26–34)
MCHC RBC AUTO-ENTMCNC: 33.5 G/DL (ref 31–36)
MCV RBC AUTO: 100 FL (ref 80–100)
METHEMOGLOBIN ARTERIAL: 0.4 %
MONOCYTES ABSOLUTE: 0.9 K/UL (ref 0–1.3)
MONOCYTES RELATIVE PERCENT: 5 %
NEUTROPHILS ABSOLUTE: 15.8 K/UL (ref 1.7–7.7)
NEUTROPHILS RELATIVE PERCENT: 83 %
O2 CONTENT ARTERIAL: 18 ML/DL
O2 SAT, ARTERIAL: 92 % (ref 93–100)
O2 SAT, ARTERIAL: 93 % (ref 93–100)
O2 SAT, ARTERIAL: 97.7 %
O2 THERAPY: ABNORMAL
PCO2 ARTERIAL: 43.6 MM HG (ref 35–45)
PCO2 ARTERIAL: 48 MMHG (ref 35–45)
PCO2 ARTERIAL: 51.4 MM HG (ref 35–45)
PDW BLD-RTO: 14.5 % (ref 12.4–15.4)
PERFORMED ON: ABNORMAL
PERFORMED ON: ABNORMAL
PH ARTERIAL: 7.29 (ref 7.35–7.45)
PH ARTERIAL: 7.31 (ref 7.35–7.45)
PH ARTERIAL: 7.35 (ref 7.35–7.45)
PLATELET # BLD: 245 K/UL (ref 135–450)
PMV BLD AUTO: 8.4 FL (ref 5–10.5)
PO2 ARTERIAL: 114.3 MMHG (ref 75–108)
PO2 ARTERIAL: 65.8 MM HG (ref 75–108)
PO2 ARTERIAL: 76.6 MM HG (ref 75–108)
POC SAMPLE TYPE: ABNORMAL
POC SAMPLE TYPE: ABNORMAL
POTASSIUM REFLEX MAGNESIUM: 4.5 MMOL/L (ref 3.5–5.1)
RBC # BLD: 3.77 M/UL (ref 4–5.2)
SODIUM BLD-SCNC: 141 MMOL/L (ref 136–145)
TCO2 ARTERIAL: 25 MMOL/L
TCO2 ARTERIAL: 25.3 MMOL/L
TCO2 ARTERIAL: 27 MMOL/L
TOTAL PROTEIN: 5.9 G/DL (ref 6.4–8.2)
TROPONIN: <0.01 NG/ML
WBC # BLD: 18.8 K/UL (ref 4–11)

## 2018-10-15 PROCEDURE — 82803 BLOOD GASES ANY COMBINATION: CPT

## 2018-10-15 PROCEDURE — 2580000003 HC RX 258: Performed by: EMERGENCY MEDICINE

## 2018-10-15 PROCEDURE — 6360000002 HC RX W HCPCS: Performed by: INTERNAL MEDICINE

## 2018-10-15 PROCEDURE — 6360000002 HC RX W HCPCS

## 2018-10-15 PROCEDURE — 2580000003 HC RX 258: Performed by: INTERNAL MEDICINE

## 2018-10-15 PROCEDURE — 36592 COLLECT BLOOD FROM PICC: CPT

## 2018-10-15 PROCEDURE — 85025 COMPLETE CBC W/AUTO DIFF WBC: CPT

## 2018-10-15 PROCEDURE — 71045 X-RAY EXAM CHEST 1 VIEW: CPT

## 2018-10-15 PROCEDURE — 94770 HC ETCO2 MONITOR DAILY: CPT

## 2018-10-15 PROCEDURE — 2580000003 HC RX 258

## 2018-10-15 PROCEDURE — 80053 COMPREHEN METABOLIC PANEL: CPT

## 2018-10-15 PROCEDURE — 6370000000 HC RX 637 (ALT 250 FOR IP): Performed by: INTERNAL MEDICINE

## 2018-10-15 PROCEDURE — 2700000000 HC OXYGEN THERAPY PER DAY

## 2018-10-15 PROCEDURE — 93306 TTE W/DOPPLER COMPLETE: CPT

## 2018-10-15 PROCEDURE — 94640 AIRWAY INHALATION TREATMENT: CPT

## 2018-10-15 PROCEDURE — 2000000000 HC ICU R&B

## 2018-10-15 PROCEDURE — 99291 CRITICAL CARE FIRST HOUR: CPT | Performed by: INTERNAL MEDICINE

## 2018-10-15 PROCEDURE — 2500000003 HC RX 250 WO HCPCS: Performed by: INTERNAL MEDICINE

## 2018-10-15 PROCEDURE — 70553 MRI BRAIN STEM W/O & W/DYE: CPT

## 2018-10-15 PROCEDURE — 96374 THER/PROPH/DIAG INJ IV PUSH: CPT

## 2018-10-15 PROCEDURE — 94750 HC PULMONARY COMPLIANCE STUDY: CPT

## 2018-10-15 PROCEDURE — 84484 ASSAY OF TROPONIN QUANT: CPT

## 2018-10-15 PROCEDURE — 94761 N-INVAS EAR/PLS OXIMETRY MLT: CPT

## 2018-10-15 PROCEDURE — 6360000004 HC RX CONTRAST MEDICATION: Performed by: INTERNAL MEDICINE

## 2018-10-15 PROCEDURE — A9579 GAD-BASE MR CONTRAST NOS,1ML: HCPCS | Performed by: INTERNAL MEDICINE

## 2018-10-15 PROCEDURE — 94762 N-INVAS EAR/PLS OXIMTRY CONT: CPT

## 2018-10-15 PROCEDURE — 82947 ASSAY GLUCOSE BLOOD QUANT: CPT

## 2018-10-15 PROCEDURE — 94003 VENT MGMT INPAT SUBQ DAY: CPT

## 2018-10-15 PROCEDURE — 99292 CRITICAL CARE ADDL 30 MIN: CPT | Performed by: INTERNAL MEDICINE

## 2018-10-15 PROCEDURE — S0028 INJECTION, FAMOTIDINE, 20 MG: HCPCS | Performed by: INTERNAL MEDICINE

## 2018-10-15 PROCEDURE — 99233 SBSQ HOSP IP/OBS HIGH 50: CPT | Performed by: INTERNAL MEDICINE

## 2018-10-15 RX ORDER — SODIUM CHLORIDE 9 MG/ML
INJECTION, SOLUTION INTRAVENOUS
Status: COMPLETED
Start: 2018-10-15 | End: 2018-10-15

## 2018-10-15 RX ORDER — MIDAZOLAM HYDROCHLORIDE 5 MG/ML
5 INJECTION INTRAMUSCULAR; INTRAVENOUS ONCE
Status: COMPLETED | OUTPATIENT
Start: 2018-10-15 | End: 2018-10-15

## 2018-10-15 RX ORDER — METHYLPREDNISOLONE SODIUM SUCCINATE 40 MG/ML
40 INJECTION, POWDER, LYOPHILIZED, FOR SOLUTION INTRAMUSCULAR; INTRAVENOUS EVERY 6 HOURS
Status: DISCONTINUED | OUTPATIENT
Start: 2018-10-15 | End: 2018-10-17

## 2018-10-15 RX ORDER — MIDAZOLAM HYDROCHLORIDE 5 MG/ML
INJECTION INTRAMUSCULAR; INTRAVENOUS
Status: COMPLETED
Start: 2018-10-15 | End: 2018-10-15

## 2018-10-15 RX ORDER — CARBOXYMETHYLCELLULOSE SODIUM 10 MG/ML
1 GEL OPHTHALMIC 3 TIMES DAILY
Status: DISCONTINUED | OUTPATIENT
Start: 2018-10-15 | End: 2018-10-17

## 2018-10-15 RX ADMIN — GADOTERIDOL 20 ML: 279.3 INJECTION, SOLUTION INTRAVENOUS at 17:54

## 2018-10-15 RX ADMIN — Medication 10 ML: at 21:01

## 2018-10-15 RX ADMIN — PROPOFOL 45 MCG/KG/MIN: 10 INJECTION, EMULSION INTRAVENOUS at 01:51

## 2018-10-15 RX ADMIN — FAMOTIDINE 20 MG: 10 INJECTION, SOLUTION INTRAVENOUS at 21:02

## 2018-10-15 RX ADMIN — CEFTRIAXONE SODIUM 1 G: 1 INJECTION, POWDER, FOR SOLUTION INTRAMUSCULAR; INTRAVENOUS at 03:56

## 2018-10-15 RX ADMIN — METHYLPREDNISOLONE SODIUM SUCCINATE 40 MG: 40 INJECTION, POWDER, FOR SOLUTION INTRAMUSCULAR; INTRAVENOUS at 12:15

## 2018-10-15 RX ADMIN — FENTANYL CITRATE 100 MCG/HR: 50 INJECTION INTRAVENOUS at 23:49

## 2018-10-15 RX ADMIN — DEXMEDETOMIDINE HYDROCHLORIDE 1.4 MCG/KG/HR: 100 INJECTION, SOLUTION INTRAVENOUS at 19:00

## 2018-10-15 RX ADMIN — PROPOFOL 50 MCG/KG/MIN: 10 INJECTION, EMULSION INTRAVENOUS at 04:34

## 2018-10-15 RX ADMIN — METHYLPREDNISOLONE SODIUM SUCCINATE 40 MG: 40 INJECTION, POWDER, FOR SOLUTION INTRAMUSCULAR; INTRAVENOUS at 18:53

## 2018-10-15 RX ADMIN — SODIUM CHLORIDE 250 ML: 9 INJECTION, SOLUTION INTRAVENOUS at 18:53

## 2018-10-15 RX ADMIN — MUPIROCIN: 20 OINTMENT TOPICAL at 21:01

## 2018-10-15 RX ADMIN — CARBOXYMETHYLCELLULOSE SODIUM 1 DROP: 10 GEL OPHTHALMIC at 21:02

## 2018-10-15 RX ADMIN — CHLORHEXIDINE GLUCONATE 15 ML: 1.2 RINSE ORAL at 07:44

## 2018-10-15 RX ADMIN — ENOXAPARIN SODIUM 40 MG: 40 INJECTION SUBCUTANEOUS at 07:44

## 2018-10-15 RX ADMIN — DEXMEDETOMIDINE HYDROCHLORIDE 1.4 MCG/KG/HR: 100 INJECTION, SOLUTION INTRAVENOUS at 23:23

## 2018-10-15 RX ADMIN — DEXMEDETOMIDINE HYDROCHLORIDE 0.2 MCG/KG/HR: 100 INJECTION, SOLUTION INTRAVENOUS at 09:38

## 2018-10-15 RX ADMIN — MIDAZOLAM HYDROCHLORIDE 5 MG: 5 INJECTION, SOLUTION INTRAMUSCULAR; INTRAVENOUS at 14:55

## 2018-10-15 RX ADMIN — IPRATROPIUM BROMIDE AND ALBUTEROL SULFATE 1 AMPULE: .5; 3 SOLUTION RESPIRATORY (INHALATION) at 08:28

## 2018-10-15 RX ADMIN — DOXYCYCLINE 100 MG: 100 INJECTION, POWDER, LYOPHILIZED, FOR SOLUTION INTRAVENOUS at 18:53

## 2018-10-15 RX ADMIN — FAMOTIDINE 20 MG: 10 INJECTION, SOLUTION INTRAVENOUS at 09:51

## 2018-10-15 RX ADMIN — IPRATROPIUM BROMIDE AND ALBUTEROL SULFATE 1 AMPULE: .5; 3 SOLUTION RESPIRATORY (INHALATION) at 15:46

## 2018-10-15 RX ADMIN — DEXMEDETOMIDINE HYDROCHLORIDE 1.4 MCG/KG/HR: 100 INJECTION, SOLUTION INTRAVENOUS at 20:11

## 2018-10-15 RX ADMIN — PROPOFOL 50 MCG/KG/MIN: 10 INJECTION, EMULSION INTRAVENOUS at 07:50

## 2018-10-15 RX ADMIN — MIDAZOLAM HYDROCHLORIDE 5 MG: 5 INJECTION INTRAMUSCULAR; INTRAVENOUS at 14:55

## 2018-10-15 RX ADMIN — MUPIROCIN: 20 OINTMENT TOPICAL at 07:44

## 2018-10-15 RX ADMIN — SODIUM CHLORIDE 500 ML: 9 INJECTION, SOLUTION INTRAVENOUS at 18:53

## 2018-10-15 RX ADMIN — IPRATROPIUM BROMIDE AND ALBUTEROL SULFATE 1 AMPULE: .5; 3 SOLUTION RESPIRATORY (INHALATION) at 11:15

## 2018-10-15 RX ADMIN — PROPOFOL 25 MCG/KG/MIN: 10 INJECTION, EMULSION INTRAVENOUS at 11:32

## 2018-10-15 RX ADMIN — CARBOXYMETHYLCELLULOSE SODIUM 1 DROP: 10 GEL OPHTHALMIC at 12:14

## 2018-10-15 RX ADMIN — METHYLPREDNISOLONE SODIUM SUCCINATE 40 MG: 40 INJECTION, POWDER, FOR SOLUTION INTRAMUSCULAR; INTRAVENOUS at 07:44

## 2018-10-15 RX ADMIN — CARBOXYMETHYLCELLULOSE SODIUM 1 DROP: 10 GEL OPHTHALMIC at 09:42

## 2018-10-15 RX ADMIN — FENTANYL CITRATE 100 MCG/HR: 50 INJECTION INTRAVENOUS at 06:54

## 2018-10-15 RX ADMIN — SODIUM CHLORIDE 250 ML: 9 INJECTION, SOLUTION INTRAVENOUS at 09:37

## 2018-10-15 RX ADMIN — IPRATROPIUM BROMIDE AND ALBUTEROL SULFATE 1 AMPULE: .5; 3 SOLUTION RESPIRATORY (INHALATION) at 19:30

## 2018-10-15 RX ADMIN — DOXYCYCLINE 100 MG: 100 INJECTION, POWDER, LYOPHILIZED, FOR SOLUTION INTRAVENOUS at 07:44

## 2018-10-15 RX ADMIN — SODIUM CHLORIDE: 9 INJECTION, SOLUTION INTRAVENOUS at 12:27

## 2018-10-15 RX ADMIN — NOREPINEPHRINE BITARTRATE 2 MCG/MIN: 1 INJECTION INTRAVENOUS at 18:45

## 2018-10-15 RX ADMIN — CHLORHEXIDINE GLUCONATE 15 ML: 1.2 RINSE ORAL at 21:01

## 2018-10-15 ASSESSMENT — PULMONARY FUNCTION TESTS
PIF_VALUE: 41
PIF_VALUE: 23
PIF_VALUE: 28
PIF_VALUE: 28
PIF_VALUE: 26
PIF_VALUE: 27
PIF_VALUE: 20
PIF_VALUE: 26
PIF_VALUE: 23
PIF_VALUE: 28
PIF_VALUE: 26
PIF_VALUE: 28
PIF_VALUE: 21
PIF_VALUE: 25
PIF_VALUE: 26
PIF_VALUE: 21
PIF_VALUE: 26
PIF_VALUE: 24
PIF_VALUE: 39
PIF_VALUE: 20
PIF_VALUE: 17
PIF_VALUE: 22
PIF_VALUE: 26
PIF_VALUE: 26
PIF_VALUE: 29
PIF_VALUE: 24
PIF_VALUE: 29
PIF_VALUE: 24
PIF_VALUE: 23
PIF_VALUE: 24

## 2018-10-15 ASSESSMENT — PAIN SCALES - GENERAL
PAINLEVEL_OUTOF10: 0

## 2018-10-15 NOTE — PROGRESS NOTES
present          Assessment:     1. Acute resp failure, mixed  2. Acute exac of asthma  3. Vfib arrest with ROSC   -respiratory mediated due to difficult airway and prolonged hypoxia  4. Acute encephalopathy, sedation effect vs anoxia  5. Elevated lactic acid and metabolic acidosis in the setting of arrest    Plan:      -Start precedex, wean down other sedation as tolerated  -Serial ABGs, will adjust vent settings as needed  -Increase steroids, continue bronchodilators  -Echo pending, suspect respiratory trigger for arrest given reported difficult airway but cardiology involved to assist  -MRI brain to evaluate for possible anoxia, will consult neurology if not improving. Has a difficult airway so it is difficult to fully hold sedation at risk of accidental extubation.   -Empiric atb, will plan to de-escalate once clinically improved. Presumably did have aspiration given intubation issues    Due to life threatening respiratory failure and encephalopathy this patient is critically ill. Total critical care time involved in her care was 90 mins.      Angel Cage MD

## 2018-10-15 NOTE — PROGRESS NOTES
10/15/18 0017   Vent Information   Vent Type 840   Vent Mode AC/VC   Vt Ordered 450 mL   Rate Set 16 bmp   Peak Flow 55 L/min   Pressure Support 0 cmH20   FiO2  60 %   Sensitivity 3   PEEP/CPAP 5   Cuff Pressure (cm H2O) 30 cm H2O   Humidification Source HME   Vent Patient Data   Peak Inspiratory Pressure 23 cmH2O   Mean Airway Pressure 12 cmH20   Rate Measured 24 br/min   Vt Exhaled 547 mL   Minute Volume 9.94 Liters   I:E Ratio 1.60:1   Cough/Sputum   Sputum How Obtained Endotracheal   Cough Non-productive   Spontaneous Breathing Trial (SBT) RT Doc   Pulse 85   SpO2 97 %   Breath Sounds   Right Upper Lobe Diminished   Right Middle Lobe Diminished   Right Lower Lobe Diminished   Left Upper Lobe Diminished   Left Lower Lobe Diminished   Additional Respiratory  Assessments   Resp 12   End Tidal CO2 36 (%)   Alarm Settings   High Pressure Alarm 40 cmH2O   Low Minute Volume Alarm 3 L/min   High Respiratory Rate 45 br/min   ETT (adult)   No Placement Date or Time found. Tube Size: 8 mm  Laryngoscope:  Mac  Blade Size: 4  Secured at: 24 cm   Secured at 24 cm   Measured From Lips   ET Placement Left   Secured By Commercial tube cruz   Site Condition Dry

## 2018-10-15 NOTE — PROGRESS NOTES
[address]   Progress Note  Cardiology      HPI: Seeing patient for f/u Vfib arrest. Intubated and sedated currently. Issues earlier with combativeness and not responding to commands. , mother, and best friend at bedside. Physical Examination:    Vitals:    10/15/18 1600   BP: 119/72   Pulse: 79   Resp: 17   Temp: 99 °F (37.2 °C)   SpO2: 94%          Constitutional and General Appearance: NAD; intubated and resting comfortably  Respiratory:  · Normal excursion and expansion without use of accessory muscles  · Resp Auscultation: Normal breath sounds without dullness  Cardiovascular:  · The apical impulses not displaced  · Heart tones are crisp and normal  · Cervical veins are not engorged  · The carotid upstroke is normal in amplitude and contour without delay or bruit  · Normal S1S2, No S3, No Murmur  · Peripheral pulses are symmetrical and full  · There is no clubbing, cyanosis of the extremities. · No edema  · Pedal Pulses: 2+ and equal   Abdomen:  · No masses or tenderness  · Liver/Spleen: No Abnormalities Noted  Neurological/Psychiatric:  · Intubated and unable to assess  Skin: warm and dry      Lab Results   Component Value Date    WBC 18.8 (H) 10/15/2018    HGB 12.6 10/15/2018    HCT 37.7 10/15/2018    .0 10/15/2018     10/15/2018     Lab Results   Component Value Date    CREATININE 0.7 10/15/2018    BUN 17 10/15/2018     10/15/2018    K 4.5 10/15/2018     10/15/2018    CO2 23 10/15/2018     Lab Results   Component Value Date    INR 0.95 10/14/2018    PROTIME 10.8 10/14/2018        A/P: Mrs. Filiberto Vicente is a 47yo female with hx asthma, HTN, HLD, and anxiety admitted to [facility] 10/14/18 with respiratory and cardiac arrest. Per pulmonary doc diagnosed with asthma exacerbation. Apparently had arrest at bedside and difficult intubation per EMS unsuccessful. Hypoxic and had Vfib arrest requiring brief CPR and defibrillator shock. Eventually intubated in ER.  Note admit EKG

## 2018-10-15 NOTE — PLAN OF CARE
Problem: Nutrition  Goal: Optimal nutrition therapy  Outcome: Ongoing  Nutrition Problem: Inadequate energy intake  Intervention: Food and/or Nutrient Delivery: Continue NPO  Nutritional Goals:  Tolerate nutrition support at goal while intubated

## 2018-10-15 NOTE — PROGRESS NOTES
10/15/18 1931   Vent Information   Vent Type 840   Vent Mode AC/VC   Vt Ordered 500 mL   Rate Set 18 bmp   Peak Flow 55 L/min   Pressure Support 0 cmH20   FiO2  40 %   Sensitivity 3   PEEP/CPAP 5   Vent Patient Data   Peak Inspiratory Pressure 26 cmH2O   Mean Airway Pressure 12 cmH20   Rate Measured 18 br/min   Vt Exhaled 520 mL   Minute Volume 9.34 Liters   I:E Ratio 1:2.30   Plateau Pressure 19 SVY14   Static Compliance 37.14 mL/cmH2O   Dynamic Compliance 24.76 mL/cmH2O   Cough/Sputum   Sputum How Obtained Endotracheal;Suctioned   Cough Productive   Sputum Amount Small   Sputum Color Cloudy   Tenacity Thick   Spontaneous Breathing Trial (SBT) RT Doc   Pulse 78   SpO2 96 %   Breath Sounds   Right Upper Lobe Clear   Right Middle Lobe Diminished   Right Lower Lobe Diminished   Left Upper Lobe Clear   Left Lower Lobe Diminished   Additional Respiratory  Assessments   Resp 12   End Tidal CO2 33 (%)   Alarm Settings   High Pressure Alarm 40 cmH2O   Low Minute Volume Alarm 3 L/min   High Respiratory Rate 40 br/min   Patient Observation   Observations ambu bag at bedside   ETT (adult)   No Placement Date or Time found. Tube Size: 8 mm  Laryngoscope:  Mac  Blade Size: 4  Secured at: 24 cm   Secured at 24 cm   Measured From Lips   ET Placement Right   Secured By Commercial tube cruz   Site Condition Dry

## 2018-10-16 ENCOUNTER — APPOINTMENT (OUTPATIENT)
Dept: GENERAL RADIOLOGY | Age: 53
DRG: 005 | End: 2018-10-16
Payer: COMMERCIAL

## 2018-10-16 LAB
AMMONIA: 65 UMOL/L (ref 11–51)
ANION GAP SERPL CALCULATED.3IONS-SCNC: 10 MMOL/L (ref 3–16)
ANION GAP SERPL CALCULATED.3IONS-SCNC: 11 MMOL/L (ref 3–16)
BASE EXCESS ARTERIAL: -0.9 MMOL/L (ref -3–3)
BASE EXCESS ARTERIAL: -1.6 MMOL/L (ref -3–3)
BASE EXCESS ARTERIAL: -1.8 MMOL/L (ref -3–3)
BUN BLDV-MCNC: 17 MG/DL (ref 7–20)
BUN BLDV-MCNC: 21 MG/DL (ref 7–20)
CALCIUM SERPL-MCNC: 9.6 MG/DL (ref 8.3–10.6)
CALCIUM SERPL-MCNC: 9.7 MG/DL (ref 8.3–10.6)
CARBOXYHEMOGLOBIN ARTERIAL: 0.5 % (ref 0–1.5)
CARBOXYHEMOGLOBIN ARTERIAL: 0.7 % (ref 0–1.5)
CARBOXYHEMOGLOBIN ARTERIAL: 0.7 % (ref 0–1.5)
CHLORIDE BLD-SCNC: 100 MMOL/L (ref 99–110)
CHLORIDE BLD-SCNC: 104 MMOL/L (ref 99–110)
CO2: 23 MMOL/L (ref 21–32)
CO2: 24 MMOL/L (ref 21–32)
CREAT SERPL-MCNC: <0.5 MG/DL (ref 0.6–1.1)
CREAT SERPL-MCNC: <0.5 MG/DL (ref 0.6–1.1)
GFR AFRICAN AMERICAN: >60
GFR AFRICAN AMERICAN: >60
GFR NON-AFRICAN AMERICAN: >60
GFR NON-AFRICAN AMERICAN: >60
GLUCOSE BLD-MCNC: 154 MG/DL (ref 70–99)
GLUCOSE BLD-MCNC: 159 MG/DL (ref 70–99)
HCO3 ARTERIAL: 22.8 MMOL/L (ref 21–29)
HCO3 ARTERIAL: 22.9 MMOL/L (ref 21–29)
HCO3 ARTERIAL: 24.3 MMOL/L (ref 21–29)
HCT VFR BLD CALC: 40.3 % (ref 36–48)
HEMOGLOBIN, ART, EXTENDED: 14 G/DL (ref 12–16)
HEMOGLOBIN, ART, EXTENDED: 14.2 G/DL (ref 12–16)
HEMOGLOBIN, ART, EXTENDED: 14.2 G/DL (ref 12–16)
HEMOGLOBIN: 13.6 G/DL (ref 12–16)
MAGNESIUM: 1.8 MG/DL (ref 1.8–2.4)
MCH RBC QN AUTO: 33.3 PG (ref 26–34)
MCHC RBC AUTO-ENTMCNC: 33.7 G/DL (ref 31–36)
MCV RBC AUTO: 99 FL (ref 80–100)
METHEMOGLOBIN ARTERIAL: 0.4 %
METHEMOGLOBIN ARTERIAL: 0.5 %
METHEMOGLOBIN ARTERIAL: 0.5 %
O2 CONTENT ARTERIAL: 18 ML/DL
O2 CONTENT ARTERIAL: 19 ML/DL
O2 CONTENT ARTERIAL: 19 ML/DL
O2 SAT, ARTERIAL: 93.3 %
O2 SAT, ARTERIAL: 94.3 %
O2 SAT, ARTERIAL: 96.5 %
O2 THERAPY: ABNORMAL
PCO2 ARTERIAL: 34.9 MMHG (ref 35–45)
PCO2 ARTERIAL: 39.2 MMHG (ref 35–45)
PCO2 ARTERIAL: 45.3 MMHG (ref 35–45)
PDW BLD-RTO: 14.3 % (ref 12.4–15.4)
PH ARTERIAL: 7.35 (ref 7.35–7.45)
PH ARTERIAL: 7.38 (ref 7.35–7.45)
PH ARTERIAL: 7.43 (ref 7.35–7.45)
PLATELET # BLD: 223 K/UL (ref 135–450)
PMV BLD AUTO: 8.7 FL (ref 5–10.5)
PO2 ARTERIAL: 61.8 MMHG (ref 75–108)
PO2 ARTERIAL: 68.6 MMHG (ref 75–108)
PO2 ARTERIAL: 83.1 MMHG (ref 75–108)
POTASSIUM REFLEX MAGNESIUM: 4.8 MMOL/L (ref 3.5–5.1)
POTASSIUM SERPL-SCNC: 4.2 MMOL/L (ref 3.5–5.1)
RBC # BLD: 4.07 M/UL (ref 4–5.2)
SODIUM BLD-SCNC: 135 MMOL/L (ref 136–145)
SODIUM BLD-SCNC: 137 MMOL/L (ref 136–145)
TCO2 ARTERIAL: 23.9 MMOL/L
TCO2 ARTERIAL: 24.1 MMOL/L
TCO2 ARTERIAL: 25.7 MMOL/L
TRIGL SERPL-MCNC: 227 MG/DL (ref 0–150)
TROPONIN: <0.01 NG/ML
WBC # BLD: 11.7 K/UL (ref 4–11)

## 2018-10-16 PROCEDURE — 94770 HC ETCO2 MONITOR DAILY: CPT

## 2018-10-16 PROCEDURE — 6370000000 HC RX 637 (ALT 250 FOR IP): Performed by: INTERNAL MEDICINE

## 2018-10-16 PROCEDURE — 2500000003 HC RX 250 WO HCPCS: Performed by: INTERNAL MEDICINE

## 2018-10-16 PROCEDURE — 2580000003 HC RX 258

## 2018-10-16 PROCEDURE — 2580000003 HC RX 258: Performed by: INTERNAL MEDICINE

## 2018-10-16 PROCEDURE — 2000000000 HC ICU R&B

## 2018-10-16 PROCEDURE — 2700000000 HC OXYGEN THERAPY PER DAY

## 2018-10-16 PROCEDURE — 85027 COMPLETE CBC AUTOMATED: CPT

## 2018-10-16 PROCEDURE — 71045 X-RAY EXAM CHEST 1 VIEW: CPT

## 2018-10-16 PROCEDURE — 6360000002 HC RX W HCPCS: Performed by: INTERNAL MEDICINE

## 2018-10-16 PROCEDURE — 36592 COLLECT BLOOD FROM PICC: CPT

## 2018-10-16 PROCEDURE — 99291 CRITICAL CARE FIRST HOUR: CPT | Performed by: INTERNAL MEDICINE

## 2018-10-16 PROCEDURE — 82803 BLOOD GASES ANY COMBINATION: CPT

## 2018-10-16 PROCEDURE — 94003 VENT MGMT INPAT SUBQ DAY: CPT

## 2018-10-16 PROCEDURE — 94750 HC PULMONARY COMPLIANCE STUDY: CPT

## 2018-10-16 PROCEDURE — 82140 ASSAY OF AMMONIA: CPT

## 2018-10-16 PROCEDURE — 84478 ASSAY OF TRIGLYCERIDES: CPT

## 2018-10-16 PROCEDURE — S0028 INJECTION, FAMOTIDINE, 20 MG: HCPCS | Performed by: INTERNAL MEDICINE

## 2018-10-16 PROCEDURE — 94640 AIRWAY INHALATION TREATMENT: CPT

## 2018-10-16 PROCEDURE — 80048 BASIC METABOLIC PNL TOTAL CA: CPT

## 2018-10-16 PROCEDURE — 99232 SBSQ HOSP IP/OBS MODERATE 35: CPT | Performed by: INTERNAL MEDICINE

## 2018-10-16 PROCEDURE — 2580000003 HC RX 258: Performed by: EMERGENCY MEDICINE

## 2018-10-16 PROCEDURE — 83735 ASSAY OF MAGNESIUM: CPT

## 2018-10-16 PROCEDURE — 94761 N-INVAS EAR/PLS OXIMETRY MLT: CPT

## 2018-10-16 RX ORDER — ACETAMINOPHEN 325 MG/1
650 TABLET ORAL EVERY 4 HOURS PRN
Status: DISCONTINUED | OUTPATIENT
Start: 2018-10-16 | End: 2018-10-29

## 2018-10-16 RX ORDER — SODIUM CHLORIDE 9 MG/ML
INJECTION, SOLUTION INTRAVENOUS
Status: COMPLETED
Start: 2018-10-16 | End: 2018-10-16

## 2018-10-16 RX ORDER — IPRATROPIUM BROMIDE AND ALBUTEROL SULFATE 2.5; .5 MG/3ML; MG/3ML
1 SOLUTION RESPIRATORY (INHALATION) EVERY 4 HOURS PRN
Status: DISCONTINUED | OUTPATIENT
Start: 2018-10-16 | End: 2018-10-24

## 2018-10-16 RX ADMIN — IPRATROPIUM BROMIDE AND ALBUTEROL SULFATE 1 AMPULE: .5; 3 SOLUTION RESPIRATORY (INHALATION) at 15:34

## 2018-10-16 RX ADMIN — MUPIROCIN: 20 OINTMENT TOPICAL at 08:39

## 2018-10-16 RX ADMIN — SODIUM CHLORIDE: 900 INJECTION, SOLUTION INTRAVENOUS at 19:33

## 2018-10-16 RX ADMIN — MUPIROCIN: 20 OINTMENT TOPICAL at 20:20

## 2018-10-16 RX ADMIN — IPRATROPIUM BROMIDE AND ALBUTEROL SULFATE 1 AMPULE: .5; 3 SOLUTION RESPIRATORY (INHALATION) at 19:16

## 2018-10-16 RX ADMIN — Medication 10 ML: at 21:00

## 2018-10-16 RX ADMIN — ACETAMINOPHEN 650 MG: 325 TABLET ORAL at 12:24

## 2018-10-16 RX ADMIN — CARBOXYMETHYLCELLULOSE SODIUM 1 DROP: 10 GEL OPHTHALMIC at 13:51

## 2018-10-16 RX ADMIN — Medication 10 ML: at 08:40

## 2018-10-16 RX ADMIN — METHYLPREDNISOLONE SODIUM SUCCINATE 40 MG: 40 INJECTION, POWDER, FOR SOLUTION INTRAMUSCULAR; INTRAVENOUS at 20:19

## 2018-10-16 RX ADMIN — DOXYCYCLINE 100 MG: 100 INJECTION, POWDER, LYOPHILIZED, FOR SOLUTION INTRAVENOUS at 18:47

## 2018-10-16 RX ADMIN — ACETAMINOPHEN 650 MG: 325 TABLET ORAL at 04:58

## 2018-10-16 RX ADMIN — IPRATROPIUM BROMIDE AND ALBUTEROL SULFATE 1 AMPULE: .5; 3 SOLUTION RESPIRATORY (INHALATION) at 07:46

## 2018-10-16 RX ADMIN — FAMOTIDINE 20 MG: 10 INJECTION, SOLUTION INTRAVENOUS at 20:19

## 2018-10-16 RX ADMIN — DEXMEDETOMIDINE HYDROCHLORIDE 1 MCG/KG/HR: 100 INJECTION, SOLUTION INTRAVENOUS at 18:57

## 2018-10-16 RX ADMIN — DEXMEDETOMIDINE HYDROCHLORIDE 1.2 MCG/KG/HR: 100 INJECTION, SOLUTION INTRAVENOUS at 07:05

## 2018-10-16 RX ADMIN — CARBOXYMETHYLCELLULOSE SODIUM 1 DROP: 10 GEL OPHTHALMIC at 08:39

## 2018-10-16 RX ADMIN — ENOXAPARIN SODIUM 40 MG: 40 INJECTION SUBCUTANEOUS at 08:39

## 2018-10-16 RX ADMIN — CHLORHEXIDINE GLUCONATE 15 ML: 1.2 RINSE ORAL at 08:39

## 2018-10-16 RX ADMIN — METHYLPREDNISOLONE SODIUM SUCCINATE 40 MG: 40 INJECTION, POWDER, FOR SOLUTION INTRAMUSCULAR; INTRAVENOUS at 13:51

## 2018-10-16 RX ADMIN — IPRATROPIUM BROMIDE AND ALBUTEROL SULFATE 1 AMPULE: .5; 3 SOLUTION RESPIRATORY (INHALATION) at 23:16

## 2018-10-16 RX ADMIN — CARBOXYMETHYLCELLULOSE SODIUM 1 DROP: 10 GEL OPHTHALMIC at 20:19

## 2018-10-16 RX ADMIN — METHYLPREDNISOLONE SODIUM SUCCINATE 40 MG: 40 INJECTION, POWDER, FOR SOLUTION INTRAMUSCULAR; INTRAVENOUS at 08:39

## 2018-10-16 RX ADMIN — CEFTRIAXONE SODIUM 1 G: 1 INJECTION, POWDER, FOR SOLUTION INTRAMUSCULAR; INTRAVENOUS at 05:02

## 2018-10-16 RX ADMIN — FENTANYL CITRATE 50 MCG/HR: 50 INJECTION INTRAVENOUS at 22:09

## 2018-10-16 RX ADMIN — DEXMEDETOMIDINE HYDROCHLORIDE 1.4 MCG/KG/HR: 100 INJECTION, SOLUTION INTRAVENOUS at 22:09

## 2018-10-16 RX ADMIN — METHYLPREDNISOLONE SODIUM SUCCINATE 40 MG: 40 INJECTION, POWDER, FOR SOLUTION INTRAMUSCULAR; INTRAVENOUS at 02:07

## 2018-10-16 RX ADMIN — DOXYCYCLINE 100 MG: 100 INJECTION, POWDER, LYOPHILIZED, FOR SOLUTION INTRAVENOUS at 06:04

## 2018-10-16 RX ADMIN — DEXMEDETOMIDINE HYDROCHLORIDE 1.2 MCG/KG/HR: 100 INJECTION, SOLUTION INTRAVENOUS at 11:09

## 2018-10-16 RX ADMIN — DEXMEDETOMIDINE HYDROCHLORIDE 1.2 MCG/KG/HR: 100 INJECTION, SOLUTION INTRAVENOUS at 14:45

## 2018-10-16 RX ADMIN — CHLORHEXIDINE GLUCONATE 15 ML: 1.2 RINSE ORAL at 20:20

## 2018-10-16 RX ADMIN — IPRATROPIUM BROMIDE AND ALBUTEROL SULFATE 1 AMPULE: .5; 3 SOLUTION RESPIRATORY (INHALATION) at 12:20

## 2018-10-16 RX ADMIN — FAMOTIDINE 20 MG: 10 INJECTION, SOLUTION INTRAVENOUS at 08:39

## 2018-10-16 ASSESSMENT — PULMONARY FUNCTION TESTS
PIF_VALUE: 23
PIF_VALUE: 21
PIF_VALUE: 19
PIF_VALUE: 21
PIF_VALUE: 25
PIF_VALUE: 21
PIF_VALUE: 19
PIF_VALUE: 22
PIF_VALUE: 25
PIF_VALUE: 19
PIF_VALUE: 22
PIF_VALUE: 21
PIF_VALUE: 24
PIF_VALUE: 19
PIF_VALUE: 13
PIF_VALUE: 19
PIF_VALUE: 23
PIF_VALUE: 40
PIF_VALUE: 22
PIF_VALUE: 20
PIF_VALUE: 19
PIF_VALUE: 18
PIF_VALUE: 21
PIF_VALUE: 24
PIF_VALUE: 32
PIF_VALUE: 21
PIF_VALUE: 26
PIF_VALUE: 25
PIF_VALUE: 26
PIF_VALUE: 22
PIF_VALUE: 27

## 2018-10-16 ASSESSMENT — PAIN SCALES - GENERAL
PAINLEVEL_OUTOF10: 0
PAINLEVEL_OUTOF10: 0
PAINLEVEL_OUTOF10: 1
PAINLEVEL_OUTOF10: 0
PAINLEVEL_OUTOF10: 0

## 2018-10-16 NOTE — PROGRESS NOTES
10/16/18 0343   Vent Information   Vent Type 840   Vent Mode AC/VC   Vt Ordered 500 mL   Rate Set 18 bmp   Peak Flow 55 L/min   Pressure Support 0 cmH20   FiO2  40 %   Sensitivity 3   PEEP/CPAP 5   Cuff Pressure (cm H2O) 30 cm H2O   Humidification Source HME   Vent Patient Data   Peak Inspiratory Pressure 21 cmH2O   Mean Airway Pressure 11 cmH20   Rate Measured 18 br/min   Vt Exhaled 532 mL   Minute Volume 9.31 Liters   I:E Ratio 1:2.30   Spontaneous Breathing Trial (SBT) RT Doc   Pulse 76   SpO2 95 %   Breath Sounds   Right Upper Lobe Clear   Right Middle Lobe Clear   Right Lower Lobe Clear   Left Upper Lobe Clear   Left Lower Lobe Clear   Additional Respiratory  Assessments   Resp 18   End Tidal CO2 34 (%)   Alarm Settings   High Pressure Alarm 40 cmH2O   Low Minute Volume Alarm 3 L/min   High Respiratory Rate 40 br/min   Low Exhaled Vt  300 mL   ETT (adult)   No Placement Date or Time found. Tube Size: 8 mm  Laryngoscope:  Mac  Blade Size: 4  Secured at: 24 cm   Secured at 24 cm   Measured From [address] [place] [address],[address] By Commercial tube cruz   Site Condition Dry

## 2018-10-16 NOTE — PROGRESS NOTES
10/16/18 1533   Vent Information   Vent Type 840   Vent Mode AC/VC   Vt Ordered 500 mL   Rate Set 18 bmp   Peak Flow 55 L/min   Pressure Support 0 cmH20   FiO2  40 %   Sensitivity 3   PEEP/CPAP 5   Cuff Pressure (cm H2O) 30 cm H2O   Humidification Source HME   Vent Patient Data   Peak Inspiratory Pressure 19 cmH2O   Mean Airway Pressure 10 cmH20   Rate Measured 18 br/min   Vt Exhaled 534 mL   Minute Volume 9.28 Liters   I:E Ratio 1:2.20   Plateau Pressure 15 [id number]   Static Compliance 53.4 mL/cmH2O   Dynamic Compliance 38.14 mL/cmH2O   Cough/Sputum   Sputum How Obtained Endotracheal   Cough Productive   Sputum Amount Small   Sputum Color Clear   Tenacity Thick; Thin   Spontaneous Breathing Trial (SBT) RT Doc   Pulse 68   SpO2 96 %   Breath Sounds   Right Upper Lobe Clear;Diminished   Right Middle Lobe Clear;Diminished   Right Lower Lobe Clear;Diminished   Left Upper Lobe Clear;Diminished   Left Lower Lobe Clear;Diminished   Additional Respiratory  Assessments   Resp 17   End Tidal CO2 31 (%)   Alarm Settings   High Pressure Alarm 40 cmH2O   Low Minute Volume Alarm 3 L/min   High Respiratory Rate 40 br/min   ETT (adult)   No Placement Date or Time found. Tube Size: 8 mm  Laryngoscope:  Mac  Blade Size: 4  Secured at: 24 cm   Secured at 24 cm   Measured From [address] [place] [address],[address] By Commercial tube cruz   Site Condition Dry     ambu bag/mask @ bedside

## 2018-10-16 NOTE — FLOWSHEET NOTE
Pt opening eyes, not tracking or following commands at this time. Coughing and thrashing head side to side at times. Monitoring closely.

## 2018-10-16 NOTE — PROGRESS NOTES
10/16/18 1920   Vent Information   Vt Ordered 500 mL   Rate Set 18 bmp   Peak Flow 55 L/min   Pressure Support 0 cmH20   FiO2  40 %   Sensitivity 3   PEEP/CPAP 5   I Time/ I Time % 30 s   Humidification Source HME   Vent Patient Data   Peak Inspiratory Pressure 20 cmH2O   Mean Airway Pressure 12 cmH20   Rate Measured 24 br/min   Vt Exhaled 573 mL   Minute Volume 12.6 Liters   I:E Ratio 1:2.30   Plateau Pressure 14 AAJ39   Static Compliance 63 mL/cmH2O   Dynamic Compliance 38 mL/cmH2O   Cough/Sputum   Sputum How Obtained Endotracheal   Cough Productive   Sputum Amount Small   Sputum Color Cloudy; Yellow   Tenacity Thick; Thin   Spontaneous Breathing Trial (SBT) RT Doc   Pulse 84   SpO2 94 %   Breath Sounds   Right Upper Lobe Clear   Right Middle Lobe Clear   Right Lower Lobe Diminished   Left Upper Lobe Clear   Left Lower Lobe Diminished   Additional Respiratory  Assessments   Resp 16   End Tidal CO2 28 (%)   Alarm Settings   High Pressure Alarm 40 cmH2O   Low Minute Volume Alarm 3 L/min   High Respiratory Rate 40 br/min   ETT (adult)   No Placement Date or Time found. Tube Size: 8 mm  Laryngoscope:  Mac  Blade Size: 4  Secured at: 24 cm   Secured at 24 cm   Measured From Lips   ET Placement Left   Secured By Commercial tube cruz   Site Condition Dry

## 2018-10-16 NOTE — PROGRESS NOTES
Pulmonary & Critical Care Inpatient Progress Note   Mariluz Boyd MD     REASON FOR TODAY'S VISIT:  Critical care management    SUBJECTIVE:   Remains on vent support  Minimally responsive off all sedation except precedex today  Had suctioning of secretions from ETT by respiratory and low grade fevers. Scheduled Meds:   carboxymethylcellulose PF  1 drop Both Eyes TID    famotidine (PEPCID) injection  20 mg Intravenous BID    methylPREDNISolone  40 mg Intravenous Q6H    cefTRIAXone (ROCEPHIN) IV  1 g Intravenous Q24H    sodium chloride flush  10 mL Intravenous 2 times per day    enoxaparin  40 mg Subcutaneous Daily    doxycycline (VIBRAMYCIN) IV  100 mg Intravenous Q12H    chlorhexidine  15 mL Mouth/Throat BID    mupirocin   Topical BID    ipratropium-albuterol  1 ampule Inhalation 4x daily       Continuous Infusions:   sodium chloride      dexmedetomidine (PRECEDEX) IV infusion 1 mcg/kg/hr (10/16/18 1857)    fentaNYL (SUBLIMAZE) infusion Stopped (10/16/18 1102)    norepinephrine Stopped (10/15/18 2011)    propofol Stopped (10/15/18 1320)       PRN Meds:  acetaminophen, sodium chloride flush, magnesium hydroxide, ondansetron, potassium chloride **OR** potassium chloride **OR** potassium chloride, magnesium sulfate    ALLERGIES:  Patient is allergic to aspirin; nsaids; and penicillins. Objective:   PHYSICAL EXAM:  /87   Pulse 68   Temp 100.5 °F (38.1 °C) (Core)   Resp 13   Ht 5' 8\" (1.727 m)   Wt 222 lb 0.1 oz (100.7 kg)   SpO2 95%   BMI 33.76 kg/m²    Physical Exam   Constitutional: She appears well-developed and well-nourished. No distress. HENT:   Head: Normocephalic and atraumatic. Mouth/Throat: Oropharynx is clear and moist. No oropharyngeal exudate. Eyes: Pupils are equal, round, and reactive to light. EOM are normal.   Neck: Neck supple. No JVD present. Cardiovascular: Normal heart sounds. Exam reveals no gallop and no friction rub. No murmur heard.   Pulmonary/Chest:

## 2018-10-16 NOTE — PROGRESS NOTES
midline. Respiratory:  Normal respiratory effort. Clear to auscultation, bilaterally without Rales/Wheezes/Rhonchi. Cardiovascular:  Regular rate and rhythm with normal S1/S2 without murmurs, rubs or gallops. Abdomen: Soft, non-tender, non-distended with normal bowel sounds. Musculoskeletal:  No clubbing, cyanosis or edema bilaterally.  Full range of motion without deformity. Skin: Skin color, texture, turgor normal.  No rashes or lesions. Neurologic:  Cannot test as pt intubated/sedated  Psychiatric:  Alert and oriented, thought content appropriate, normal insight  Capillary Refill: Brisk,< 3 seconds   Peripheral Pulses: +2 palpable, equal bilaterally       Labs:   Recent Labs      10/14/18   0210  10/15/18   0615  10/16/18   0511   WBC  25.2*  18.8*  11.7*   HGB  15.0  12.6  13.6   HCT  44.5  37.7  40.3   PLT  348  245  223     Recent Labs      10/14/18   0210  10/15/18   0615  10/16/18   0511   NA  140  141  137   K  4.2  4.5  4.8   CL  103  108  104   CO2  19*  23  23   BUN  13  17  17   CREATININE  0.7  0.7  <0.5*   CALCIUM  9.4  8.6  9.6     Recent Labs      10/14/18   0210  10/15/18   0615   AST  72*  26   ALT  46*  32   BILITOT  0.3  <0.2   ALKPHOS  87  59     Recent Labs      10/14/18   0210   INR  0.95     Recent Labs      10/14/18   0210  10/15/18   1915  10/15/18   2352   TROPONINI  <0.01  <0.01  <0.01       Urinalysis:    Lab Results   Component Value Date    NITRU Negative 10/14/2018    WBCUA 3-5 10/14/2018    BACTERIA 3+ 10/14/2018    RBCUA 20-50 10/14/2018    BLOODU MODERATE 10/14/2018    SPECGRAV >=1.030 10/14/2018    GLUCOSEU 250 10/14/2018    GLUCOSEU NEGATIVE 02/14/2011       Radiology:  XR CHEST PORTABLE   Final Result   New right basilar and possible left lower lobe atelectasis and/or pneumonia. MRI BRAIN W WO CONTRAST   Final Result   1. No evidence of intracranial mass, acute ischemia, or edema.       2.  Mucosal thickening associated with right maxillary sinus and minimal rocephin/doxycycline started for possible resp infection, adjusted as needed     Cardiac arrest- possibly from hypoxia, but pt did also apparently have vfib, ekg did note some ischemia but no ST elevation  -echo ordered(ef 40%, HK of inf/anteroseptal/septal walls, severe LV dilation)  -cards consulted, apprec recs, plan for possible LHC(given risk factors of smoker, age, htn, hld, family hx)  -drug screen ordered   -MRI brain negative for obvious ischemic injury    HTN-held acei for now     Asthma/COPD- on iv steroids, duonebs, pt still smokes  -resume home meds when more stable     Anxiety- held meds initially  -on sedation currently     Chronic back pain- with hx of back surgery  -will need to resume pain meds when alert    DVT Prophylaxis: lovenox  Diet: Diet NPO Effective Now  Code Status: Full Code    PT/OT Eval Status: not possible    Dispo - ICU care, possible extubation today if stable    Abhijeet Velazquez MD

## 2018-10-17 ENCOUNTER — APPOINTMENT (OUTPATIENT)
Dept: GENERAL RADIOLOGY | Age: 53
DRG: 005 | End: 2018-10-17
Payer: COMMERCIAL

## 2018-10-17 LAB
ANION GAP SERPL CALCULATED.3IONS-SCNC: 10 MMOL/L (ref 3–16)
BASE EXCESS ARTERIAL: -0.1 MMOL/L (ref -3–3)
BASE EXCESS ARTERIAL: 0.1 MMOL/L (ref -3–3)
BASE EXCESS ARTERIAL: 0.3 MMOL/L (ref -3–3)
BUN BLDV-MCNC: 24 MG/DL (ref 7–20)
CALCIUM SERPL-MCNC: 9.4 MG/DL (ref 8.3–10.6)
CARBOXYHEMOGLOBIN ARTERIAL: 0.4 % (ref 0–1.5)
CARBOXYHEMOGLOBIN ARTERIAL: 0.4 % (ref 0–1.5)
CARBOXYHEMOGLOBIN ARTERIAL: 0.8 % (ref 0–1.5)
CHLORIDE BLD-SCNC: 103 MMOL/L (ref 99–110)
CO2: 24 MMOL/L (ref 21–32)
CREAT SERPL-MCNC: <0.5 MG/DL (ref 0.6–1.1)
GFR AFRICAN AMERICAN: >60
GFR NON-AFRICAN AMERICAN: >60
GLUCOSE BLD-MCNC: 153 MG/DL (ref 70–99)
HCO3 ARTERIAL: 22.8 MMOL/L (ref 21–29)
HCO3 ARTERIAL: 24.1 MMOL/L (ref 21–29)
HCO3 ARTERIAL: 24.3 MMOL/L (ref 21–29)
HCT VFR BLD CALC: 40.5 % (ref 36–48)
HEMOGLOBIN, ART, EXTENDED: 14.2 G/DL (ref 12–16)
HEMOGLOBIN, ART, EXTENDED: 14.3 G/DL (ref 12–16)
HEMOGLOBIN, ART, EXTENDED: 15.9 G/DL (ref 12–16)
HEMOGLOBIN: 13.6 G/DL (ref 12–16)
MCH RBC QN AUTO: 33.2 PG (ref 26–34)
MCHC RBC AUTO-ENTMCNC: 33.6 G/DL (ref 31–36)
MCV RBC AUTO: 98.7 FL (ref 80–100)
METHEMOGLOBIN ARTERIAL: 0.2 %
METHEMOGLOBIN ARTERIAL: 0.3 %
METHEMOGLOBIN ARTERIAL: 0.4 %
O2 CONTENT ARTERIAL: 19 ML/DL
O2 CONTENT ARTERIAL: 19 ML/DL
O2 CONTENT ARTERIAL: 20 ML/DL
O2 SAT, ARTERIAL: 92.5 %
O2 SAT, ARTERIAL: 95.2 %
O2 SAT, ARTERIAL: 95.4 %
O2 THERAPY: ABNORMAL
O2 THERAPY: ABNORMAL
O2 THERAPY: NORMAL
PCO2 ARTERIAL: 32.2 MMHG (ref 35–45)
PCO2 ARTERIAL: 36.5 MMHG (ref 35–45)
PCO2 ARTERIAL: 38.3 MMHG (ref 35–45)
PDW BLD-RTO: 13.9 % (ref 12.4–15.4)
PH ARTERIAL: 7.42 (ref 7.35–7.45)
PH ARTERIAL: 7.44 (ref 7.35–7.45)
PH ARTERIAL: 7.47 (ref 7.35–7.45)
PHOSPHORUS: 2.5 MG/DL (ref 2.5–4.9)
PLATELET # BLD: 212 K/UL (ref 135–450)
PMV BLD AUTO: 8.9 FL (ref 5–10.5)
PO2 ARTERIAL: 61.7 MMHG (ref 75–108)
PO2 ARTERIAL: 72.1 MMHG (ref 75–108)
PO2 ARTERIAL: 78.8 MMHG (ref 75–108)
POTASSIUM REFLEX MAGNESIUM: 4 MMOL/L (ref 3.5–5.1)
RBC # BLD: 4.1 M/UL (ref 4–5.2)
SODIUM BLD-SCNC: 137 MMOL/L (ref 136–145)
TCO2 ARTERIAL: 23.7 MMOL/L
TCO2 ARTERIAL: 25.2 MMOL/L
TCO2 ARTERIAL: 25.5 MMOL/L
WBC # BLD: 12.4 K/UL (ref 4–11)

## 2018-10-17 PROCEDURE — 6360000002 HC RX W HCPCS: Performed by: INTERNAL MEDICINE

## 2018-10-17 PROCEDURE — 2500000003 HC RX 250 WO HCPCS: Performed by: INTERNAL MEDICINE

## 2018-10-17 PROCEDURE — 2700000000 HC OXYGEN THERAPY PER DAY

## 2018-10-17 PROCEDURE — 2000000000 HC ICU R&B

## 2018-10-17 PROCEDURE — 80048 BASIC METABOLIC PNL TOTAL CA: CPT

## 2018-10-17 PROCEDURE — 99291 CRITICAL CARE FIRST HOUR: CPT | Performed by: INTERNAL MEDICINE

## 2018-10-17 PROCEDURE — 6370000000 HC RX 637 (ALT 250 FOR IP): Performed by: INTERNAL MEDICINE

## 2018-10-17 PROCEDURE — 94003 VENT MGMT INPAT SUBQ DAY: CPT

## 2018-10-17 PROCEDURE — 94664 DEMO&/EVAL PT USE INHALER: CPT

## 2018-10-17 PROCEDURE — 2580000003 HC RX 258: Performed by: EMERGENCY MEDICINE

## 2018-10-17 PROCEDURE — 94640 AIRWAY INHALATION TREATMENT: CPT

## 2018-10-17 PROCEDURE — 94761 N-INVAS EAR/PLS OXIMETRY MLT: CPT

## 2018-10-17 PROCEDURE — 2580000003 HC RX 258: Performed by: INTERNAL MEDICINE

## 2018-10-17 PROCEDURE — S0028 INJECTION, FAMOTIDINE, 20 MG: HCPCS | Performed by: INTERNAL MEDICINE

## 2018-10-17 PROCEDURE — 71045 X-RAY EXAM CHEST 1 VIEW: CPT

## 2018-10-17 PROCEDURE — 84100 ASSAY OF PHOSPHORUS: CPT

## 2018-10-17 PROCEDURE — 85027 COMPLETE CBC AUTOMATED: CPT

## 2018-10-17 PROCEDURE — 94770 HC ETCO2 MONITOR DAILY: CPT

## 2018-10-17 PROCEDURE — 82803 BLOOD GASES ANY COMBINATION: CPT

## 2018-10-17 PROCEDURE — 36592 COLLECT BLOOD FROM PICC: CPT

## 2018-10-17 RX ORDER — LABETALOL HYDROCHLORIDE 5 MG/ML
20 INJECTION, SOLUTION INTRAVENOUS EVERY 4 HOURS PRN
Status: DISCONTINUED | OUTPATIENT
Start: 2018-10-17 | End: 2018-10-30 | Stop reason: HOSPADM

## 2018-10-17 RX ORDER — METHYLPREDNISOLONE SODIUM SUCCINATE 40 MG/ML
40 INJECTION, POWDER, LYOPHILIZED, FOR SOLUTION INTRAMUSCULAR; INTRAVENOUS EVERY 12 HOURS
Status: DISCONTINUED | OUTPATIENT
Start: 2018-10-18 | End: 2018-10-18

## 2018-10-17 RX ORDER — IPRATROPIUM BROMIDE AND ALBUTEROL SULFATE 2.5; .5 MG/3ML; MG/3ML
1 SOLUTION RESPIRATORY (INHALATION) EVERY 4 HOURS
Status: DISCONTINUED | OUTPATIENT
Start: 2018-10-18 | End: 2018-10-20

## 2018-10-17 RX ADMIN — CARBOXYMETHYLCELLULOSE SODIUM 1 DROP: 10 GEL OPHTHALMIC at 13:28

## 2018-10-17 RX ADMIN — IPRATROPIUM BROMIDE AND ALBUTEROL SULFATE 1 AMPULE: .5; 3 SOLUTION RESPIRATORY (INHALATION) at 03:36

## 2018-10-17 RX ADMIN — CEFTRIAXONE SODIUM 1 G: 1 INJECTION, POWDER, FOR SOLUTION INTRAMUSCULAR; INTRAVENOUS at 05:03

## 2018-10-17 RX ADMIN — IPRATROPIUM BROMIDE AND ALBUTEROL SULFATE 1 AMPULE: .5; 3 SOLUTION RESPIRATORY (INHALATION) at 20:00

## 2018-10-17 RX ADMIN — DEXMEDETOMIDINE HYDROCHLORIDE 0.6 MCG/KG/HR: 100 INJECTION, SOLUTION INTRAVENOUS at 11:54

## 2018-10-17 RX ADMIN — FAMOTIDINE 20 MG: 10 INJECTION, SOLUTION INTRAVENOUS at 07:48

## 2018-10-17 RX ADMIN — ACETAMINOPHEN 650 MG: 325 TABLET ORAL at 11:29

## 2018-10-17 RX ADMIN — DEXMEDETOMIDINE HYDROCHLORIDE 1.4 MCG/KG/HR: 100 INJECTION, SOLUTION INTRAVENOUS at 05:35

## 2018-10-17 RX ADMIN — Medication 10 ML: at 07:54

## 2018-10-17 RX ADMIN — IPRATROPIUM BROMIDE AND ALBUTEROL SULFATE 1 AMPULE: .5; 3 SOLUTION RESPIRATORY (INHALATION) at 07:45

## 2018-10-17 RX ADMIN — DEXMEDETOMIDINE HYDROCHLORIDE 1.4 MCG/KG/HR: 100 INJECTION, SOLUTION INTRAVENOUS at 02:19

## 2018-10-17 RX ADMIN — Medication 10 ML: at 20:47

## 2018-10-17 RX ADMIN — DOCUSATE SODIUM 100 MG: 50 LIQUID ORAL at 10:41

## 2018-10-17 RX ADMIN — CHLORHEXIDINE GLUCONATE 15 ML: 1.2 RINSE ORAL at 07:48

## 2018-10-17 RX ADMIN — DOXYCYCLINE 100 MG: 100 INJECTION, POWDER, LYOPHILIZED, FOR SOLUTION INTRAVENOUS at 18:17

## 2018-10-17 RX ADMIN — METHYLPREDNISOLONE SODIUM SUCCINATE 40 MG: 40 INJECTION, POWDER, FOR SOLUTION INTRAMUSCULAR; INTRAVENOUS at 07:51

## 2018-10-17 RX ADMIN — DOXYCYCLINE 100 MG: 100 INJECTION, POWDER, LYOPHILIZED, FOR SOLUTION INTRAVENOUS at 05:36

## 2018-10-17 RX ADMIN — LABETALOL HYDROCHLORIDE 20 MG: 5 INJECTION INTRAVENOUS at 17:13

## 2018-10-17 RX ADMIN — IPRATROPIUM BROMIDE AND ALBUTEROL SULFATE 1 AMPULE: .5; 3 SOLUTION RESPIRATORY (INHALATION) at 12:10

## 2018-10-17 RX ADMIN — MUPIROCIN: 20 OINTMENT TOPICAL at 07:48

## 2018-10-17 RX ADMIN — ENOXAPARIN SODIUM 40 MG: 40 INJECTION SUBCUTANEOUS at 07:48

## 2018-10-17 RX ADMIN — IPRATROPIUM BROMIDE AND ALBUTEROL SULFATE 1 AMPULE: .5; 3 SOLUTION RESPIRATORY (INHALATION) at 14:58

## 2018-10-17 RX ADMIN — CARBOXYMETHYLCELLULOSE SODIUM 1 DROP: 10 GEL OPHTHALMIC at 07:48

## 2018-10-17 RX ADMIN — DEXMEDETOMIDINE HYDROCHLORIDE 2 MCG/KG/HR: 100 INJECTION, SOLUTION INTRAVENOUS at 08:43

## 2018-10-17 RX ADMIN — MUPIROCIN: 20 OINTMENT TOPICAL at 20:47

## 2018-10-17 RX ADMIN — FAMOTIDINE 20 MG: 10 INJECTION, SOLUTION INTRAVENOUS at 20:47

## 2018-10-17 RX ADMIN — METHYLPREDNISOLONE SODIUM SUCCINATE 40 MG: 40 INJECTION, POWDER, FOR SOLUTION INTRAMUSCULAR; INTRAVENOUS at 13:28

## 2018-10-17 RX ADMIN — LABETALOL HYDROCHLORIDE 20 MG: 5 INJECTION INTRAVENOUS at 21:34

## 2018-10-17 RX ADMIN — METHYLPREDNISOLONE SODIUM SUCCINATE 40 MG: 40 INJECTION, POWDER, FOR SOLUTION INTRAMUSCULAR; INTRAVENOUS at 02:19

## 2018-10-17 ASSESSMENT — PULMONARY FUNCTION TESTS
PIF_VALUE: 10
PIF_VALUE: 12
PIF_VALUE: 11
PIF_VALUE: 7
PIF_VALUE: 10
PIF_VALUE: 11
PIF_VALUE: 39
PIF_VALUE: 12
PIF_VALUE: 15
PIF_VALUE: 10
PIF_VALUE: 16
PIF_VALUE: 14
PIF_VALUE: 11
PIF_VALUE: 15
PIF_VALUE: 6
PIF_VALUE: 15
PIF_VALUE: 17
PIF_VALUE: 17
PIF_VALUE: 11
PIF_VALUE: 11

## 2018-10-17 ASSESSMENT — PAIN SCALES - GENERAL
PAINLEVEL_OUTOF10: 0
PAINLEVEL_OUTOF10: 0
PAINLEVEL_OUTOF10: 6
PAINLEVEL_OUTOF10: 0

## 2018-10-17 NOTE — CONSULTS
Nutrition Assessment    RD received consult for TF ordering and mgmt. Dietitians following with nutrition assessment and recommendations in RD progress notes. Will continue to follow per [facility]. 1. Recommend Vital Low calorie, high protein formula at 20 mL/hr. Increase by 10 mL q 4 hrs as tolerated until goal of 60 mL/hr is met. Recommend 30 mL free water flush q 4 hrs to maintain tube integrity. 2. Monitor nutrition adequacy, pertinent labs, bowel habits, wt changes, and clinical progress          Goal TF & Flush Orders Provides: goal without propofol is Vital high protein at 60 mL/hr x 20 hrs to provide 1200 mL TV, 1200 kcals, 105 gm protein, and 1003 mL free fluid. Goal may need adjusting pending propofol titrations  · Estimated Daily Total Kcal: [id number]  · Estimated Daily Protein (g):   · Estimated Daily Fluid (ml/day): Per MD 2/2 critical illness       Electronically signed by Bony Rose.  Damián Rodriguez RD, LD on 10/17/18 at 9:09 AM    Contact Number: [phone]

## 2018-10-17 NOTE — PROGRESS NOTES
motion. No jugular venous distention. Trachea midline. Respiratory:  Normal respiratory effort. Clear to auscultation, bilaterally without Rales/Wheezes/Rhonchi. Cardiovascular:  Regular rate and rhythm with normal S1/S2 without murmurs, rubs or gallops. Abdomen: Soft, non-tender, non-distended with normal bowel sounds. Musculoskeletal:  No clubbing, cyanosis or edema bilaterally.  Full range of motion without deformity. Skin: Skin color, texture, turgor normal.  No rashes or lesions. Neurologic:  Cannot test as pt intubated/sedated  Psychiatric:  Alert and oriented, thought content appropriate, normal insight  Capillary Refill: Brisk,< 3 seconds   Peripheral Pulses: +2 palpable, equal bilaterally       Labs:   Recent Labs      10/15/18   0615  10/16/18   0511  10/17/18   0520   WBC  18.8*  11.7*  12.4*   HGB  12.6  13.6  13.6   HCT  37.7  40.3  40.5   PLT  245  223  212     Recent Labs      10/16/18   0511  10/16/18   1850  10/17/18   0520   NA  137  135*  137   K  4.8  4.2  4.0   CL  104  100  103   CO2  23  24  24   BUN  17  21*  24*   CREATININE  <0.5*  <0.5*  <0.5*   CALCIUM  9.6  9.7  9.4   PHOS   --    --   2.5     Recent Labs      10/15/18   0615   AST  26   ALT  32   BILITOT  <0.2   ALKPHOS  59     No results for input(s): INR in the last 72 hours. Recent Labs      10/15/18   1915  10/15/18   2352   TROPONINI  <0.01  <0.01       Urinalysis:    Lab Results   Component Value Date    NITRU Negative 10/14/2018    WBCUA 3-5 10/14/2018    BACTERIA 3+ 10/14/2018    RBCUA 20-50 10/14/2018    BLOODU MODERATE 10/14/2018    SPECGRAV >=1.030 10/14/2018    GLUCOSEU 250 10/14/2018    GLUCOSEU NEGATIVE 02/14/2011       Radiology:  XR CHEST PORTABLE   Final Result   Improved aeration in the right base. XR CHEST PORTABLE   Final Result   New right basilar and possible left lower lobe atelectasis and/or pneumonia. MRI BRAIN W WO CONTRAST   Final Result   1.   No evidence of intracranial mass, acute

## 2018-10-17 NOTE — PLAN OF CARE
Problem: Restraint Use - Nonviolent/Non-Self-Destructive Behavior:  Goal: Absence of restraint indications  Absence of restraint indications   Outcome: Ongoing  Patient still demonstrating indications of the need to be restrained at this time. Visual safety checks performed every hour, and restraint monitoring performed every two hours. Patient has no signs of injuries from restraints at this time. Educated pt on the need for the restraints, no evidence of learning. Will continue to monitor. Problem: Falls - Risk of:  Goal: Will remain free from falls  Will remain free from falls   Outcome: Ongoing  Fall risk assessment complete, fall precautions in place. Fall visuals posted, bed alarm on, bed in lowest position with wheels locked. Patient has been free of falls this shift, will continue to monitor. Problem: Risk for Impaired Skin Integrity  Goal: Tissue integrity - skin and mucous membranes  Structural intactness and normal physiological function of skin and  mucous membranes. Intervention: SKIN ASSESSMENT  Skin assessment complete. Pt at risk for skin breakdown. See Luis Antonio score. Pt remains on bedrest. Unable to reposition self in bed. Heels elevated off bed. Will continue to turn and reposition patient every two hours and as needed. Will continue to keep patient clean and dry, applying skin care cream as needed. Pillows used for positioning. Will continue to monitor and assess for skin breakdown.

## 2018-10-17 NOTE — PROGRESS NOTES
RN entered room to check pt d/t alarming vent. Pt rocking head back and forth. Family at bedside, had put grandchild on phone and held to pt ear. Increased precedex to 1.4 mcg/kg/hr. Repositioned pt. Verbal redirection ineffective. Pt still not tracking with eyes or following commands. Coughing, increased work of breathing, chewing on tube. Fentanyl restarted, see MAR. Lights decreased and explained to family importance of not overstimulating pt. Noted small amount bloody sputum from pt mouth, appears to have bitten side of tongue. Mouth care provided. Pt seems to be relaxing in bed now. Will monitor.

## 2018-10-18 ENCOUNTER — APPOINTMENT (OUTPATIENT)
Dept: GENERAL RADIOLOGY | Age: 53
DRG: 005 | End: 2018-10-18
Payer: COMMERCIAL

## 2018-10-18 ENCOUNTER — APPOINTMENT (OUTPATIENT)
Dept: MRI IMAGING | Age: 53
DRG: 005 | End: 2018-10-18
Payer: COMMERCIAL

## 2018-10-18 LAB
ANION GAP SERPL CALCULATED.3IONS-SCNC: 12 MMOL/L (ref 3–16)
BASE EXCESS ARTERIAL: 4.2 MMOL/L (ref -3–3)
BUN BLDV-MCNC: 24 MG/DL (ref 7–20)
C-REACTIVE PROTEIN: 37.7 MG/L (ref 0–5.1)
CALCIUM SERPL-MCNC: 9.5 MG/DL (ref 8.3–10.6)
CARBOXYHEMOGLOBIN ARTERIAL: 1.1 % (ref 0–1.5)
CHLORIDE BLD-SCNC: 106 MMOL/L (ref 99–110)
CO2: 27 MMOL/L (ref 21–32)
CORTISOL - AM: 10.2 UG/DL (ref 4.3–22.4)
CREAT SERPL-MCNC: <0.5 MG/DL (ref 0.6–1.1)
FOLATE: 3.61 NG/ML (ref 4.78–24.2)
GFR AFRICAN AMERICAN: >60
GFR NON-AFRICAN AMERICAN: >60
GLUCOSE BLD-MCNC: 132 MG/DL (ref 70–99)
HCO3 ARTERIAL: 27.7 MMOL/L (ref 21–29)
HCT VFR BLD CALC: 40.3 % (ref 36–48)
HEMOGLOBIN, ART, EXTENDED: 14.2 G/DL (ref 12–16)
HEMOGLOBIN: 13.6 G/DL (ref 12–16)
MCH RBC QN AUTO: 33 PG (ref 26–34)
MCHC RBC AUTO-ENTMCNC: 33.7 G/DL (ref 31–36)
MCV RBC AUTO: 97.9 FL (ref 80–100)
METHEMOGLOBIN ARTERIAL: 0.3 %
O2 CONTENT ARTERIAL: 19 ML/DL
O2 SAT, ARTERIAL: 96.7 %
O2 THERAPY: ABNORMAL
PCO2 ARTERIAL: 37.9 MMHG (ref 35–45)
PDW BLD-RTO: 13.8 % (ref 12.4–15.4)
PH ARTERIAL: 7.48 (ref 7.35–7.45)
PHOSPHORUS: 2.3 MG/DL (ref 2.5–4.9)
PLATELET # BLD: 212 K/UL (ref 135–450)
PMV BLD AUTO: 9 FL (ref 5–10.5)
PO2 ARTERIAL: 81.9 MMHG (ref 75–108)
POTASSIUM REFLEX MAGNESIUM: 3.6 MMOL/L (ref 3.5–5.1)
RBC # BLD: 4.12 M/UL (ref 4–5.2)
SEDIMENTATION RATE, ERYTHROCYTE: 39 MM/HR (ref 0–30)
SODIUM BLD-SCNC: 145 MMOL/L (ref 136–145)
T3 TOTAL: 0.79 NG/ML (ref 0.8–2)
T4 FREE: 1.3 NG/DL (ref 0.9–1.8)
TCO2 ARTERIAL: 28.9 MMOL/L
THYROID PEROXIDASE (TPO) ABS: 35 IU/ML
TSH REFLEX: 0.2 UIU/ML (ref 0.27–4.2)
VITAMIN B-12: 376 PG/ML (ref 211–911)
WBC # BLD: 14.8 K/UL (ref 4–11)

## 2018-10-18 PROCEDURE — 2500000003 HC RX 250 WO HCPCS: Performed by: INTERNAL MEDICINE

## 2018-10-18 PROCEDURE — 6360000002 HC RX W HCPCS: Performed by: INTERNAL MEDICINE

## 2018-10-18 PROCEDURE — 94660 CPAP INITIATION&MGMT: CPT

## 2018-10-18 PROCEDURE — 86140 C-REACTIVE PROTEIN: CPT

## 2018-10-18 PROCEDURE — 86593 SYPHILIS TEST NON-TREP QUANT: CPT

## 2018-10-18 PROCEDURE — 84480 ASSAY TRIIODOTHYRONINE (T3): CPT

## 2018-10-18 PROCEDURE — 71045 X-RAY EXAM CHEST 1 VIEW: CPT

## 2018-10-18 PROCEDURE — 2000000000 HC ICU R&B

## 2018-10-18 PROCEDURE — 2700000000 HC OXYGEN THERAPY PER DAY

## 2018-10-18 PROCEDURE — S0028 INJECTION, FAMOTIDINE, 20 MG: HCPCS | Performed by: INTERNAL MEDICINE

## 2018-10-18 PROCEDURE — 2580000003 HC RX 258: Performed by: INTERNAL MEDICINE

## 2018-10-18 PROCEDURE — 70551 MRI BRAIN STEM W/O DYE: CPT

## 2018-10-18 PROCEDURE — 82607 VITAMIN B-12: CPT

## 2018-10-18 PROCEDURE — 99291 CRITICAL CARE FIRST HOUR: CPT | Performed by: INTERNAL MEDICINE

## 2018-10-18 PROCEDURE — 82803 BLOOD GASES ANY COMBINATION: CPT

## 2018-10-18 PROCEDURE — 84443 ASSAY THYROID STIM HORMONE: CPT

## 2018-10-18 PROCEDURE — 6370000000 HC RX 637 (ALT 250 FOR IP): Performed by: INTERNAL MEDICINE

## 2018-10-18 PROCEDURE — 82533 TOTAL CORTISOL: CPT

## 2018-10-18 PROCEDURE — 2580000003 HC RX 258: Performed by: EMERGENCY MEDICINE

## 2018-10-18 PROCEDURE — 85027 COMPLETE CBC AUTOMATED: CPT

## 2018-10-18 PROCEDURE — 84100 ASSAY OF PHOSPHORUS: CPT

## 2018-10-18 PROCEDURE — 86376 MICROSOMAL ANTIBODY EACH: CPT

## 2018-10-18 PROCEDURE — 94640 AIRWAY INHALATION TREATMENT: CPT

## 2018-10-18 PROCEDURE — 84439 ASSAY OF FREE THYROXINE: CPT

## 2018-10-18 PROCEDURE — 86780 TREPONEMA PALLIDUM: CPT

## 2018-10-18 PROCEDURE — 84425 ASSAY OF VITAMIN B-1: CPT

## 2018-10-18 PROCEDURE — 85652 RBC SED RATE AUTOMATED: CPT

## 2018-10-18 PROCEDURE — 82746 ASSAY OF FOLIC ACID SERUM: CPT

## 2018-10-18 PROCEDURE — 94761 N-INVAS EAR/PLS OXIMETRY MLT: CPT

## 2018-10-18 PROCEDURE — 95819 EEG AWAKE AND ASLEEP: CPT

## 2018-10-18 PROCEDURE — 80048 BASIC METABOLIC PNL TOTAL CA: CPT

## 2018-10-18 RX ORDER — ACETAMINOPHEN 650 MG/1
650 SUPPOSITORY RECTAL EVERY 4 HOURS PRN
Status: DISCONTINUED | OUTPATIENT
Start: 2018-10-18 | End: 2018-10-29

## 2018-10-18 RX ORDER — SODIUM CHLORIDE, SODIUM LACTATE, POTASSIUM CHLORIDE, CALCIUM CHLORIDE 600; 310; 30; 20 MG/100ML; MG/100ML; MG/100ML; MG/100ML
INJECTION, SOLUTION INTRAVENOUS CONTINUOUS
Status: DISCONTINUED | OUTPATIENT
Start: 2018-10-18 | End: 2018-10-20

## 2018-10-18 RX ORDER — METHYLPREDNISOLONE SODIUM SUCCINATE 40 MG/ML
40 INJECTION, POWDER, LYOPHILIZED, FOR SOLUTION INTRAMUSCULAR; INTRAVENOUS DAILY
Status: DISCONTINUED | OUTPATIENT
Start: 2018-10-19 | End: 2018-10-19

## 2018-10-18 RX ADMIN — DEXMEDETOMIDINE HYDROCHLORIDE 0.7 MCG/KG/HR: 100 INJECTION, SOLUTION INTRAVENOUS at 03:00

## 2018-10-18 RX ADMIN — FAMOTIDINE 20 MG: 10 INJECTION, SOLUTION INTRAVENOUS at 09:26

## 2018-10-18 RX ADMIN — DOXYCYCLINE 100 MG: 100 INJECTION, POWDER, LYOPHILIZED, FOR SOLUTION INTRAVENOUS at 06:50

## 2018-10-18 RX ADMIN — Medication 10 ML: at 09:26

## 2018-10-18 RX ADMIN — DOXYCYCLINE 100 MG: 100 INJECTION, POWDER, LYOPHILIZED, FOR SOLUTION INTRAVENOUS at 19:01

## 2018-10-18 RX ADMIN — IPRATROPIUM BROMIDE AND ALBUTEROL SULFATE 1 AMPULE: .5; 3 SOLUTION RESPIRATORY (INHALATION) at 12:12

## 2018-10-18 RX ADMIN — THIAMINE HYDROCHLORIDE 100 MG: 100 INJECTION, SOLUTION INTRAMUSCULAR; INTRAVENOUS at 10:09

## 2018-10-18 RX ADMIN — DEXMEDETOMIDINE HYDROCHLORIDE 1.2 MCG/KG/HR: 100 INJECTION, SOLUTION INTRAVENOUS at 23:51

## 2018-10-18 RX ADMIN — Medication 10 ML: at 21:34

## 2018-10-18 RX ADMIN — ACETAMINOPHEN 650 MG: 650 SUPPOSITORY RECTAL at 23:11

## 2018-10-18 RX ADMIN — SODIUM CHLORIDE, POTASSIUM CHLORIDE, SODIUM LACTATE AND CALCIUM CHLORIDE: 600; 310; 30; 20 INJECTION, SOLUTION INTRAVENOUS at 10:02

## 2018-10-18 RX ADMIN — IPRATROPIUM BROMIDE AND ALBUTEROL SULFATE 1 AMPULE: .5; 3 SOLUTION RESPIRATORY (INHALATION) at 08:36

## 2018-10-18 RX ADMIN — CEFEPIME HYDROCHLORIDE 2 G: 2 INJECTION, POWDER, FOR SOLUTION INTRAVENOUS at 09:25

## 2018-10-18 RX ADMIN — IPRATROPIUM BROMIDE AND ALBUTEROL SULFATE 1 AMPULE: .5; 3 SOLUTION RESPIRATORY (INHALATION) at 19:43

## 2018-10-18 RX ADMIN — IPRATROPIUM BROMIDE AND ALBUTEROL SULFATE 1 AMPULE: .5; 3 SOLUTION RESPIRATORY (INHALATION) at 04:00

## 2018-10-18 RX ADMIN — CEFEPIME HYDROCHLORIDE 2 G: 2 INJECTION, POWDER, FOR SOLUTION INTRAVENOUS at 21:33

## 2018-10-18 RX ADMIN — DEXMEDETOMIDINE HYDROCHLORIDE 0.6 MCG/KG/HR: 100 INJECTION, SOLUTION INTRAVENOUS at 10:31

## 2018-10-18 RX ADMIN — CEFTRIAXONE SODIUM 1 G: 1 INJECTION, POWDER, FOR SOLUTION INTRAMUSCULAR; INTRAVENOUS at 06:01

## 2018-10-18 RX ADMIN — MUPIROCIN: 20 OINTMENT TOPICAL at 09:26

## 2018-10-18 RX ADMIN — IPRATROPIUM BROMIDE AND ALBUTEROL SULFATE 1 AMPULE: .5; 3 SOLUTION RESPIRATORY (INHALATION) at 00:00

## 2018-10-18 RX ADMIN — METHYLPREDNISOLONE SODIUM SUCCINATE 40 MG: 40 INJECTION, POWDER, FOR SOLUTION INTRAMUSCULAR; INTRAVENOUS at 01:26

## 2018-10-18 RX ADMIN — FAMOTIDINE 20 MG: 10 INJECTION, SOLUTION INTRAVENOUS at 21:49

## 2018-10-18 RX ADMIN — MUPIROCIN: 20 OINTMENT TOPICAL at 21:34

## 2018-10-18 RX ADMIN — ENOXAPARIN SODIUM 40 MG: 40 INJECTION SUBCUTANEOUS at 09:25

## 2018-10-18 ASSESSMENT — PAIN SCALES - GENERAL: PAINLEVEL_OUTOF10: 3

## 2018-10-18 NOTE — PROGRESS NOTES
10/18/18 1946   NIV Type   Equipment Type v60   Mode BIPAP   Mask Type Full face mask   Mask Size Medium   Settings/Measurements   Comfort Level Good   Using Accessory Muscles No   CPAP 10 cmH2O   Resp (!) 38   SpO2 98   FiO2  40 %   Vt Exhaled 396 mL   Mask Leak (lpm) 28 lpm   Skin Protection for O2 Device Yes   Breath Sounds   Right Upper Lobe Crackles   Right Middle Lobe Crackles   Right Lower Lobe Crackles   Left Upper Lobe Crackles   Left Lower Lobe Crackles   Alarm Settings   Alarms On Y   Press Low Alarm 6 cmH2O   High Pressure Alarm 28 cmH2O   Apnea (secs) 20 secs   Resp Rate Low Alarm 6   High Respiratory Rate 45 br/min

## 2018-10-18 NOTE — CONSULTS
times daily. albuterol (PROVENTIL HFA;VENTOLIN HFA) 108 (90 BASE) MCG/ACT inhaler, Inhale 2 puffs into the lungs every 6 hours as needed. NONFORMULARY, Take 1 tablet by mouth daily \"I take a large white blood pressure pill\"  oxyCODONE-acetaminophen (PERCOCET)  MG per tablet, Take 1 tablet by mouth every 4 hours as needed for Pain . UNKNOWN TO PATIENT, Indications: takes med for depression unsure of name (topamil?)  paroxetine (PAXIL) 20 MG tablet, Take 20 mg by mouth 2 times daily. Review of Systems:  ROS:  Unable to obtain due to encephalopathy. OBJECTIVE   Neurological Exam:  Exam:  Blood pressure (!) 146/104, pulse 102, temperature 98.2 °F (36.8 °C), temperature source Oral, resp. rate 23, height 5' 8\" (1.727 m), weight 231 lb 0.7 oz (104.8 kg), SpO2 95 %. Constitutional    Vital signs: BP, HR, and RR reviewed   General Unresponsive, , well-nourished  Eyes: fundoscopic exam not visualized. Cardiovascular: pulses symmetric in all 4 extremities. No peripheral edema. Psychiatric: She is unable to cooperate with exam, no  psychotic behavior noted. Neurologic  Mental status: Limited examination as the patient remains encephalopathic.    orientation limited examination due to encephalopathy.               General fund of knowledge  limited examination due to encephalopathy.              Memory  limited examination due to encephalopathy.              Attention  limited examination due to encephalopathy.              Language  limited examination due to encephalopathy.              Comprehension  limited examination due to encephalopathy. Cranial nerves:   CN2:  limited examination due to encephalopathy. CN 3,4,6:  limited examination due to encephalopathy. Right eye pupil fixed and dilated(unclear if old or new as patient's best friend says she had MRSA in that eye with surgical intervention), left eye pupil 3 mm and round and reactive to light.   CN5:  limited examination due to

## 2018-10-18 NOTE — PROGRESS NOTES
Pt. In bed, assessed, minimally responsive, to agitation and pain, non-purposeful movements. vss on cpap 40% fio2. precedex infusing 0.5mcg/kg/hr for pts agitation. Will continue to monitor.

## 2018-10-18 NOTE — PROGRESS NOTES
midline. Respiratory:  Normal respiratory effort. Clear to auscultation, bilaterally without Rales/Wheezes, noted scattered Rhonchi. Cardiovascular:  Regular rate and rhythm with normal S1/S2 without murmurs, rubs or gallops. Abdomen: Soft, non-tender, non-distended with normal bowel sounds. Musculoskeletal:  No clubbing, cyanosis or edema bilaterally.  Full range of motion without deformity. Skin: Skin color, texture, turgor normal.  No rashes or lesions. Neurologic:  Cannot test as pt encephalopathic  Psychiatric:  Alert and oriented, thought content appropriate, normal insight  Capillary Refill: Brisk,< 3 seconds   Peripheral Pulses: +2 palpable, equal bilaterally     Labs:   Recent Labs      10/16/18   0511  10/17/18   0520  10/18/18   0522   WBC  11.7*  12.4*  14.8*   HGB  13.6  13.6  13.6   HCT  40.3  40.5  40.3   PLT  223  212  212     Recent Labs      10/16/18   1850  10/17/18   0520  10/18/18   0522   NA  135*  137  145   K  4.2  4.0  3.6   CL  100  103  106   CO2  24  24  27   BUN  21*  24*  24*   CREATININE  <0.5*  <0.5*  <0.5*   CALCIUM  9.7  9.4  9.5   PHOS   --   2.5  2.3*     No results for input(s): AST, ALT, BILIDIR, BILITOT, ALKPHOS in the last 72 hours. No results for input(s): INR in the last 72 hours. Recent Labs      10/15/18   1915  10/15/18   2352   TROPONINI  <0.01  <0.01       Urinalysis:    Lab Results   Component Value Date    NITRU Negative 10/14/2018    WBCUA 3-5 10/14/2018    BACTERIA 3+ 10/14/2018    RBCUA 20-50 10/14/2018    BLOODU MODERATE 10/14/2018    SPECGRAV >=1.030 10/14/2018    GLUCOSEU 250 10/14/2018    GLUCOSEU NEGATIVE 02/14/2011       Radiology:  XR CHEST PORTABLE   Final Result   Interval extubation. Stable airspace opacification in the right lower lung   zone. XR CHEST PORTABLE   Final Result   Improved aeration in the right base. XR CHEST PORTABLE   Final Result   New right basilar and possible left lower lobe atelectasis and/or pneumonia.

## 2018-10-18 NOTE — PROGRESS NOTES
expansion bilaterally   Abdominal: Soft. Bowel sounds are normal. She exhibits no distension. There is no tenderness. Musculoskeletal: Normal range of motion. She exhibits no edema. Lymphadenopathy:     She has no cervical adenopathy. Neurological:   Confused    Skin: Skin is warm and dry. No rash noted. She is not diaphoretic. Data Reviewed:   LABS:  CBC:  Recent Labs      10/16/18   0511  10/17/18   0520  10/18/18   0522   WBC  11.7*  12.4*  14.8*   HGB  13.6  13.6  13.6   HCT  40.3  40.5  40.3   MCV  99.0  98.7  97.9   PLT  223  212  212     BMP:  Recent Labs      10/16/18   1850  10/17/18   0520  10/18/18   0522   NA  135*  137  145   K  4.2  4.0  3.6   CL  100  103  106   CO2  24  24  27   PHOS   --   2.5  2.3*   BUN  21*  24*  24*   CREATININE  <0.5*  <0.5*  <0.5*     LIVER PROFILE:   No results for input(s): AST, ALT, LIPASE, ALB, BILIDIR, BILITOT, ALKPHOS in the last 72 hours. Invalid input(s): AMYLASE  PT/INR:No results for input(s): PROTIME, INR in the last 72 hours. APTT: No results for input(s): APTT in the last 72 hours. UA:No results for input(s): NITRITE, COLORU, PHUR, LABCAST, WBCUA, RBCUA, MUCUS, TRICHOMONAS, YEAST, BACTERIA, CLARITYU, SPECGRAV, LEUKOCYTESUR, UROBILINOGEN, BILIRUBINUR, BLOODU, GLUCOSEU, AMORPHOUS in the last 72 hours. Invalid input(s): [facility]  Recent Labs      10/17/18   2325  10/18/18   0522   PHART  7.437  7.482*   SSP0LBU  36.5  37.9   PO2ART  61.7*  81.9           CXR personally reviewed, bibasilar atelectasis and right sided infiltrate unchanged          Assessment:     1. Acute resp failure, mixed  2. Acute exac of asthma  3. Vfib arrest with ROSC              -respiratory mediated due to difficult airway and prolonged hypoxia  4. Acute encephalopathy, ?anoxia    Plan:      -Continued issues with mentation, unclear etiology.  This may be acute withdrawal but also there is concern for anoxia given her difficulty with initial intubation and arrest. Will

## 2018-10-18 NOTE — PLAN OF CARE
Problem: Nutrition  Goal: Optimal nutrition therapy  Outcome: Ongoing  Nutrition Problem: Inadequate energy intake  Intervention: Food and/or Nutrient Delivery: Continue NPO (Start oral diet vs nutrition support)  Nutritional Goals:  Tolerate most appropriate nutrition therapy to meet at least 50% of estimated needs

## 2018-10-19 ENCOUNTER — APPOINTMENT (OUTPATIENT)
Dept: INTERVENTIONAL RADIOLOGY/VASCULAR | Age: 53
DRG: 005 | End: 2018-10-19
Payer: COMMERCIAL

## 2018-10-19 ENCOUNTER — APPOINTMENT (OUTPATIENT)
Dept: GENERAL RADIOLOGY | Age: 53
DRG: 005 | End: 2018-10-19
Payer: COMMERCIAL

## 2018-10-19 LAB
ANION GAP SERPL CALCULATED.3IONS-SCNC: 12 MMOL/L (ref 3–16)
BASE EXCESS ARTERIAL: 5.9 MMOL/L (ref -3–3)
BLOOD CULTURE, ROUTINE: NORMAL
BUN BLDV-MCNC: 24 MG/DL (ref 7–20)
CALCIUM SERPL-MCNC: 8.9 MG/DL (ref 8.3–10.6)
CARBOXYHEMOGLOBIN ARTERIAL: 0.9 % (ref 0–1.5)
CHLORIDE BLD-SCNC: 102 MMOL/L (ref 99–110)
CO2: 27 MMOL/L (ref 21–32)
CREAT SERPL-MCNC: <0.5 MG/DL (ref 0.6–1.1)
CULTURE, BLOOD 2: NORMAL
GFR AFRICAN AMERICAN: >60
GFR NON-AFRICAN AMERICAN: >60
GLUCOSE BLD-MCNC: 117 MG/DL (ref 70–99)
HCO3 ARTERIAL: 28.8 MMOL/L (ref 21–29)
HCT VFR BLD CALC: 38.4 % (ref 36–48)
HEMOGLOBIN, ART, EXTENDED: 13.4 G/DL (ref 12–16)
HEMOGLOBIN: 12.7 G/DL (ref 12–16)
MAGNESIUM: 1.8 MG/DL (ref 1.8–2.4)
MCH RBC QN AUTO: 32.4 PG (ref 26–34)
MCHC RBC AUTO-ENTMCNC: 33 G/DL (ref 31–36)
MCV RBC AUTO: 98.1 FL (ref 80–100)
METHEMOGLOBIN ARTERIAL: 0.3 %
O2 CONTENT ARTERIAL: 18 ML/DL
O2 SAT, ARTERIAL: 94.3 %
O2 THERAPY: ABNORMAL
PCO2 ARTERIAL: 36.3 MMHG (ref 35–45)
PDW BLD-RTO: 13.5 % (ref 12.4–15.4)
PH ARTERIAL: 7.52 (ref 7.35–7.45)
PHOSPHORUS: 2.6 MG/DL (ref 2.5–4.9)
PLATELET # BLD: 192 K/UL (ref 135–450)
PMV BLD AUTO: 9.1 FL (ref 5–10.5)
PO2 ARTERIAL: 65.3 MMHG (ref 75–108)
POTASSIUM REFLEX MAGNESIUM: 3.4 MMOL/L (ref 3.5–5.1)
RBC # BLD: 3.91 M/UL (ref 4–5.2)
SODIUM BLD-SCNC: 141 MMOL/L (ref 136–145)
TCO2 ARTERIAL: 30 MMOL/L
TOTAL SYPHILLIS IGG/IGM: NORMAL
WBC # BLD: 12.9 K/UL (ref 4–11)

## 2018-10-19 PROCEDURE — 6370000000 HC RX 637 (ALT 250 FOR IP): Performed by: INTERNAL MEDICINE

## 2018-10-19 PROCEDURE — 2580000003 HC RX 258: Performed by: INTERNAL MEDICINE

## 2018-10-19 PROCEDURE — 82803 BLOOD GASES ANY COMBINATION: CPT

## 2018-10-19 PROCEDURE — 94761 N-INVAS EAR/PLS OXIMETRY MLT: CPT

## 2018-10-19 PROCEDURE — 71045 X-RAY EXAM CHEST 1 VIEW: CPT

## 2018-10-19 PROCEDURE — 74018 RADEX ABDOMEN 1 VIEW: CPT

## 2018-10-19 PROCEDURE — 94664 DEMO&/EVAL PT USE INHALER: CPT

## 2018-10-19 PROCEDURE — 76937 US GUIDE VASCULAR ACCESS: CPT

## 2018-10-19 PROCEDURE — 94640 AIRWAY INHALATION TREATMENT: CPT

## 2018-10-19 PROCEDURE — 83735 ASSAY OF MAGNESIUM: CPT

## 2018-10-19 PROCEDURE — 2500000003 HC RX 250 WO HCPCS: Performed by: INTERNAL MEDICINE

## 2018-10-19 PROCEDURE — C1751 CATH, INF, PER/CENT/MIDLINE: HCPCS

## 2018-10-19 PROCEDURE — 31720 CLEARANCE OF AIRWAYS: CPT

## 2018-10-19 PROCEDURE — 02HV33Z INSERTION OF INFUSION DEVICE INTO SUPERIOR VENA CAVA, PERCUTANEOUS APPROACH: ICD-10-PCS | Performed by: INTERNAL MEDICINE

## 2018-10-19 PROCEDURE — 85027 COMPLETE CBC AUTOMATED: CPT

## 2018-10-19 PROCEDURE — 84100 ASSAY OF PHOSPHORUS: CPT

## 2018-10-19 PROCEDURE — 2580000003 HC RX 258

## 2018-10-19 PROCEDURE — 6360000002 HC RX W HCPCS: Performed by: INTERNAL MEDICINE

## 2018-10-19 PROCEDURE — 2060000000 HC ICU INTERMEDIATE R&B

## 2018-10-19 PROCEDURE — 36569 INSJ PICC 5 YR+ W/O IMAGING: CPT

## 2018-10-19 PROCEDURE — 83921 ORGANIC ACID SINGLE QUANT: CPT

## 2018-10-19 PROCEDURE — 80048 BASIC METABOLIC PNL TOTAL CA: CPT

## 2018-10-19 PROCEDURE — 2580000003 HC RX 258: Performed by: EMERGENCY MEDICINE

## 2018-10-19 PROCEDURE — 99291 CRITICAL CARE FIRST HOUR: CPT | Performed by: INTERNAL MEDICINE

## 2018-10-19 PROCEDURE — S0028 INJECTION, FAMOTIDINE, 20 MG: HCPCS | Performed by: INTERNAL MEDICINE

## 2018-10-19 PROCEDURE — 2700000000 HC OXYGEN THERAPY PER DAY

## 2018-10-19 PROCEDURE — 94660 CPAP INITIATION&MGMT: CPT

## 2018-10-19 RX ORDER — LIDOCAINE HYDROCHLORIDE 10 MG/ML
5 INJECTION, SOLUTION INFILTRATION; PERINEURAL ONCE
Status: COMPLETED | OUTPATIENT
Start: 2018-10-19 | End: 2018-10-19

## 2018-10-19 RX ORDER — SODIUM CHLORIDE 0.9 % (FLUSH) 0.9 %
10 SYRINGE (ML) INJECTION PRN
Status: DISCONTINUED | OUTPATIENT
Start: 2018-10-19 | End: 2018-10-30 | Stop reason: HOSPADM

## 2018-10-19 RX ORDER — SODIUM CHLORIDE 0.9 % (FLUSH) 0.9 %
10 SYRINGE (ML) INJECTION EVERY 12 HOURS SCHEDULED
Status: DISCONTINUED | OUTPATIENT
Start: 2018-10-19 | End: 2018-10-30 | Stop reason: HOSPADM

## 2018-10-19 RX ORDER — SODIUM CHLORIDE 9 MG/ML
INJECTION, SOLUTION INTRAVENOUS
Status: COMPLETED
Start: 2018-10-19 | End: 2018-10-19

## 2018-10-19 RX ORDER — FOLIC ACID 1 MG/1
1 TABLET ORAL DAILY
Status: DISCONTINUED | OUTPATIENT
Start: 2018-10-19 | End: 2018-10-29

## 2018-10-19 RX ORDER — POTASSIUM CHLORIDE 29.8 MG/ML
40 INJECTION INTRAVENOUS ONCE
Status: COMPLETED | OUTPATIENT
Start: 2018-10-19 | End: 2018-10-19

## 2018-10-19 RX ADMIN — DEXMEDETOMIDINE HYDROCHLORIDE 1.2 MCG/KG/HR: 100 INJECTION, SOLUTION INTRAVENOUS at 03:01

## 2018-10-19 RX ADMIN — IPRATROPIUM BROMIDE AND ALBUTEROL SULFATE 1 AMPULE: .5; 3 SOLUTION RESPIRATORY (INHALATION) at 15:27

## 2018-10-19 RX ADMIN — DOCUSATE SODIUM 100 MG: 50 LIQUID ORAL at 13:48

## 2018-10-19 RX ADMIN — METHYLPREDNISOLONE SODIUM SUCCINATE 40 MG: 40 INJECTION, POWDER, FOR SOLUTION INTRAMUSCULAR; INTRAVENOUS at 08:00

## 2018-10-19 RX ADMIN — Medication 10 ML: at 21:18

## 2018-10-19 RX ADMIN — CEFEPIME HYDROCHLORIDE 2 G: 2 INJECTION, POWDER, FOR SOLUTION INTRAVENOUS at 21:17

## 2018-10-19 RX ADMIN — FOLIC ACID 1 MG: 1 TABLET ORAL at 13:47

## 2018-10-19 RX ADMIN — DOXYCYCLINE 100 MG: 100 INJECTION, POWDER, LYOPHILIZED, FOR SOLUTION INTRAVENOUS at 21:16

## 2018-10-19 RX ADMIN — Medication 10 ML: at 09:18

## 2018-10-19 RX ADMIN — DOCUSATE SODIUM 100 MG: 50 LIQUID ORAL at 21:17

## 2018-10-19 RX ADMIN — LIDOCAINE HYDROCHLORIDE 5 ML: 10 INJECTION, SOLUTION EPIDURAL; INFILTRATION; INTRACAUDAL; PERINEURAL at 11:06

## 2018-10-19 RX ADMIN — SODIUM CHLORIDE, POTASSIUM CHLORIDE, SODIUM LACTATE AND CALCIUM CHLORIDE: 600; 310; 30; 20 INJECTION, SOLUTION INTRAVENOUS at 11:40

## 2018-10-19 RX ADMIN — IPRATROPIUM BROMIDE AND ALBUTEROL SULFATE 1 AMPULE: .5; 3 SOLUTION RESPIRATORY (INHALATION) at 07:29

## 2018-10-19 RX ADMIN — IPRATROPIUM BROMIDE AND ALBUTEROL SULFATE 1 AMPULE: .5; 3 SOLUTION RESPIRATORY (INHALATION) at 00:00

## 2018-10-19 RX ADMIN — DEXMEDETOMIDINE HYDROCHLORIDE 1.1 MCG/KG/HR: 100 INJECTION, SOLUTION INTRAVENOUS at 07:20

## 2018-10-19 RX ADMIN — FAMOTIDINE 20 MG: 10 INJECTION, SOLUTION INTRAVENOUS at 21:17

## 2018-10-19 RX ADMIN — DEXMEDETOMIDINE HYDROCHLORIDE 0.5 MCG/KG/HR: 100 INJECTION, SOLUTION INTRAVENOUS at 23:16

## 2018-10-19 RX ADMIN — SODIUM CHLORIDE, POTASSIUM CHLORIDE, SODIUM LACTATE AND CALCIUM CHLORIDE: 600; 310; 30; 20 INJECTION, SOLUTION INTRAVENOUS at 00:16

## 2018-10-19 RX ADMIN — THIAMINE HYDROCHLORIDE 100 MG: 100 INJECTION, SOLUTION INTRAMUSCULAR; INTRAVENOUS at 08:00

## 2018-10-19 RX ADMIN — DEXMEDETOMIDINE HYDROCHLORIDE 0.6 MCG/KG/HR: 100 INJECTION, SOLUTION INTRAVENOUS at 11:40

## 2018-10-19 RX ADMIN — FAMOTIDINE 20 MG: 10 INJECTION, SOLUTION INTRAVENOUS at 08:00

## 2018-10-19 RX ADMIN — POTASSIUM CHLORIDE 40 MEQ: 29.8 INJECTION, SOLUTION INTRAVENOUS at 09:17

## 2018-10-19 RX ADMIN — IPRATROPIUM BROMIDE AND ALBUTEROL SULFATE 1 AMPULE: .5; 3 SOLUTION RESPIRATORY (INHALATION) at 11:30

## 2018-10-19 RX ADMIN — CEFEPIME HYDROCHLORIDE 2 G: 2 INJECTION, POWDER, FOR SOLUTION INTRAVENOUS at 08:30

## 2018-10-19 RX ADMIN — SODIUM CHLORIDE 250 ML: 9 INJECTION, SOLUTION INTRAVENOUS at 06:17

## 2018-10-19 RX ADMIN — MUPIROCIN: 20 OINTMENT TOPICAL at 21:18

## 2018-10-19 RX ADMIN — MUPIROCIN: 20 OINTMENT TOPICAL at 08:00

## 2018-10-19 RX ADMIN — Medication 10 ML: at 08:00

## 2018-10-19 RX ADMIN — DOXYCYCLINE 100 MG: 100 INJECTION, POWDER, LYOPHILIZED, FOR SOLUTION INTRAVENOUS at 06:17

## 2018-10-19 RX ADMIN — IPRATROPIUM BROMIDE AND ALBUTEROL SULFATE 1 AMPULE: .5; 3 SOLUTION RESPIRATORY (INHALATION) at 04:00

## 2018-10-19 RX ADMIN — ENOXAPARIN SODIUM 40 MG: 40 INJECTION SUBCUTANEOUS at 08:30

## 2018-10-19 RX ADMIN — IPRATROPIUM BROMIDE AND ALBUTEROL SULFATE 1 AMPULE: .5; 3 SOLUTION RESPIRATORY (INHALATION) at 19:15

## 2018-10-19 ASSESSMENT — PAIN SCALES - GENERAL
PAINLEVEL_OUTOF10: 0

## 2018-10-19 NOTE — PROGRESS NOTES
10/19/18 0733   NIV Type   Equipment Type v60   Mode CPAP   Mask Type Full face mask   Mask Size Medium   Settings/Measurements   Comfort Level Good   Using Accessory Muscles No   CPAP 10 cmH2O   Resp 22   SpO2 98   FiO2  50 %   Vt Exhaled 3 mL   Mask Leak (lpm) 11 lpm   Skin Protection for O2 Device Yes   Breath Sounds   Right Upper Lobe Diminished   Right Middle Lobe Diminished   Right Lower Lobe Diminished   Left Upper Lobe Diminished   Left Lower Lobe Diminished

## 2018-10-19 NOTE — PROGRESS NOTES
Pulmonary & Critical Care Inpatient Progress Note   Danya Castro MD     REASON FOR TODAY'S VISIT:  Acute resp failure    SUBJECTIVE:   Continued confusion, intermittent cpap support to help with work of breathing but overall not having significant hypoxia or secretion issues. Scheduled Meds:   lidocaine 1 % injection  5 mL Intradermal Once    sodium chloride flush  10 mL Intravenous 2 times per day    potassium chloride  40 mEq Intravenous Once    thiamine (VITAMIN B1) IVPB  100 mg Intravenous Q24H    cefepime  2 g Intravenous Q12H    docusate  100 mg Per NG tube BID    ipratropium-albuterol  1 ampule Inhalation Q4H    influenza virus vaccine  0.5 mL Intramuscular Once    famotidine (PEPCID) injection  20 mg Intravenous BID    sodium chloride flush  10 mL Intravenous 2 times per day    enoxaparin  40 mg Subcutaneous Daily    doxycycline (VIBRAMYCIN) IV  100 mg Intravenous Q12H    mupirocin   Topical BID       Continuous Infusions:   lactated ringers 75 mL/hr at 10/19/18 0016    dexmedetomidine (PRECEDEX) IV infusion 1 mcg/kg/hr (10/19/18 0800)       PRN Meds:  sodium chloride flush, acetaminophen, labetalol, acetaminophen, ipratropium-albuterol, sodium chloride flush, magnesium hydroxide, ondansetron, potassium chloride **OR** potassium chloride **OR** potassium chloride, magnesium sulfate    ALLERGIES:  Patient is allergic to aspirin; nsaids; and penicillins. Objective:   PHYSICAL EXAM:  /86   Pulse 88   Temp 98.9 °F (37.2 °C) (Axillary)   Resp 28   Ht 5' 8\" (1.727 m)   Wt 231 lb 0.7 oz (104.8 kg)   SpO2 95%   BMI 35.13 kg/m²    Physical Exam   Constitutional: She appears well-developed and well-nourished. No distress. HENT:   Head: Normocephalic and atraumatic. Mouth/Throat: Oropharynx is clear and moist. No oropharyngeal exudate. Eyes: Pupils are equal, round, and reactive to light. EOM are normal.   Neck: Neck supple. No JVD present. Cardiovascular: Normal heart sounds.

## 2018-10-19 NOTE — PROCEDURES
Central Venous Catheter Insertion Procedure Note    Consent: Consent was obtained from the patient's . Informed consent was obtained for the procedure, including sedation. Risks of  hemorrhage, arrhythmia, infection, pneumothorax and adverse drug reaction were discussed. Indication: Respiratory failure, limited IV access     Procedure:    Ultrasound used and leftinternal jugular vein was noted to be patent. A time out was performed. Patient positioned then site prepared with chlorhexidine, draped. Sterile technique was utilized including gown, mask, drape and gloves. Lidocaine was administered via 25 gauge needle. 18 gauge needle inserted with access of leftinternal jugular vein under active ultrasound guidance. Wire inserted to 20 cm (image saved). Small incision made in skin, dilator inserted over wire then removed. Triple lumen inserted to the home, secured and sterile dressing applied. Blood return from all 3 lumens confirmed. Complications:  None. There were no changes to vital signs. Patient tolerated procedure well. CXR ordered. Total time of procedure 25 minutes.     [id number]
or  degenerative conditions as well as with medication effect. No  definite epileptiform activity was present during this recording. This is a technically fairly poor study due to significant artifact,  often obscuring the underlying background rhythms. If seizures remain  a clinical concern, then a repeat EEG may be of further benefit. Clinical correlation is advised.         KE HOUSTON DO    D: 10/18/2018 20:08:50       T: 10/18/2018 20:46:55     [email or web address]  Job#: [id number]     Doc#: [id number]    CC:

## 2018-10-19 NOTE — PROGRESS NOTES
Hospitalist Progress Note      PCP: Rivka Funez MD    Date of Admission: 10/14/2018      Chief Peggy Veronica Course:       Subjective:  Remains encephalopathic, family currently at bedside, about to get PICC         Medications:  Reviewed    Infusion Medications    lactated ringers 75 mL/hr at 10/19/18 0016    dexmedetomidine (PRECEDEX) IV infusion 0.7 mcg/kg/hr (10/19/18 1000)     Scheduled Medications    lidocaine 1 % injection  5 mL Intradermal Once    sodium chloride flush  10 mL Intravenous 2 times per day    thiamine (VITAMIN B1) IVPB  100 mg Intravenous Q24H    cefepime  2 g Intravenous Q12H    docusate  100 mg Per NG tube BID    ipratropium-albuterol  1 ampule Inhalation Q4H    influenza virus vaccine  0.5 mL Intramuscular Once    famotidine (PEPCID) injection  20 mg Intravenous BID    sodium chloride flush  10 mL Intravenous 2 times per day    enoxaparin  40 mg Subcutaneous Daily    doxycycline (VIBRAMYCIN) IV  100 mg Intravenous Q12H    mupirocin   Topical BID     PRN Meds: sodium chloride flush, acetaminophen, labetalol, acetaminophen, ipratropium-albuterol, sodium chloride flush, magnesium hydroxide, ondansetron, potassium chloride **OR** potassium chloride **OR** potassium chloride, magnesium sulfate      Intake/Output Summary (Last 24 hours) at 10/19/18 1025  Last data filed at 10/19/18 1000   Gross per 24 hour   Intake             1039 ml   Output             1240 ml   Net             -201 ml       Physical Exam Performed:    /86   Pulse 70   Temp 98.9 °F (37.2 °C) (Axillary)   Resp 19   Ht 5' 8\" (1.727 m)   Wt 231 lb 0.7 oz (104.8 kg)   SpO2 96%   BMI 35.13 kg/m²     General appearance:  No apparent distress, appears stated age and cooperative. obtunded  HEENT:  Normal cephalic, atraumatic without obvious deformity. Pupils equal, round, and reactive to light.  Extra ocular muscles intact. Conjunctivae/corneas clear.   Neck: Supple, with full Code    PT/OT Eval Status: not possible    Dispo - pending further neuro recs,  improved mentation, start folic acid, check Hilary Garcia MD

## 2018-10-19 NOTE — PROGRESS NOTES
height 5' 8\" (1.727 m), weight 231 lb 0.7 oz (104.8 kg), SpO2 95 %. Constitutional    Vital signs: BP, HR, and RR reviewed   General Unresponsive. well-nourished  Eyes: fundoscopic exam not visualized. Cardiovascular: pulses symmetric in all 4 extremities. No peripheral edema. Psychiatric: unable to cooperate with examination, no  psychotic behavior noted. Neurologic  Mental status: Limited examination as the patient remains encephalopathic.    orientation limited examination due to encephalopathy.               General fund of knowledge  limited examination due to encephalopathy.              Memory  limited examination due to encephalopathy.              Attention  limited examination due to encephalopathy.              Language  limited examination due to encephalopathy.              Comprehension  limited examination due to encephalopathy. Cranial nerves:   CN2:  limited examination due to encephalopathy. CN 3,4,6:  limited examination due to encephalopathy. Right eye pupil fixed and dilated(unclear if old or new as patient's best friend says she had MRSA in that eye with surgical intervention), left eye pupil 3 mm and round and reactive to light. CN5:  limited examination due to encephalopathy. CN7:  No facial asymmetry appreciated but limited exam.  CN8: Limited examination due to encephalopathy. CN9:  limited examination due to encephalopathy. CN11:  limited examination due to encephalopathy. CN12: limited examination due to encephalopathy. Strength: Patient's right upper extremity hyperextend. She appears to move the left side to withdrawal with minimal right upper extremity and right lower extremity movement. Deep tendon reflexes: Normal   Sensory: withdraws left upper and left lower extremity to tactile stimulation. Cerebellar/coordination:  limited examination due to encephalopathy.   Tone: Rigidity of bilateral upper and lower extremities  Gait: Deferred at this time due to safety CREATININE <0.5 (L) 0.6 - 1.1 mg/dL    GFR Non-African American >60 >60    GFR African American >60 >60    Calcium 9.5 8.3 - 10.6 mg/dL   CBC    Collection Time: 10/18/18  5:22 AM   Result Value Ref Range    WBC 14.8 (H) 4.0 - 11.0 K/uL    RBC 4.12 4.00 - 5.20 M/uL    Hemoglobin 13.6 12.0 - 16.0 g/dL    Hematocrit 40.3 36.0 - 48.0 %    MCV 97.9 80.0 - 100.0 fL    MCH 33.0 26.0 - 34.0 pg    MCHC 33.7 31.0 - 36.0 g/dL    RDW 13.8 12.4 - 15.4 %    Platelets 204 667 - 686 K/uL    MPV 9.0 5.0 - 10.5 fL   Blood gas, arterial    Collection Time: 10/18/18  5:22 AM   Result Value Ref Range    pH, Arterial 7.482 (H) 7.350 - 7.450    pCO2, Arterial 37.9 35.0 - 45.0 mmHg    pO2, Arterial 81.9 75.0 - 108.0 mmHg    HCO3, Arterial 27.7 21.0 - 29.0 mmol/L    Base Excess, Arterial 4.2 (H) -3.0 - 3.0 mmol/L    Hemoglobin, Art, Extended 14.2 12.0 - 16.0 g/dL    O2 Sat, Arterial 96.7 >92 %    Carboxyhgb, Arterial 1.1 0.0 - 1.5 %    Methemoglobin, Arterial 0.3 <1.5 %    TCO2, Arterial 28.9 Not Established mmol/L    O2 Content, Arterial 19 Not Established mL/dL    O2 Therapy Unknown    Phosphorus    Collection Time: 10/18/18  5:22 AM   Result Value Ref Range    Phosphorus 2.3 (L) 2.5 - 4.9 mg/dL   Vitamin B12 & folate    Collection Time: 10/18/18  1:50 PM   Result Value Ref Range    Vitamin B-12 376 211 - 911 pg/mL    Folate 3.61 (L) 4.78 - 24.20 ng/mL   TSH with Reflex    Collection Time: 10/18/18  1:50 PM   Result Value Ref Range    TSH 0.20 (L) 0.27 - 4.20 uIU/mL   Sedimentation Rate    Collection Time: 10/18/18  1:50 PM   Result Value Ref Range    Sed Rate 39 (H) 0 - 30 mm/Hr   C-reactive protein    Collection Time: 10/18/18  1:50 PM   Result Value Ref Range    CRP 37.7 (H) 0.0 - 5.1 mg/L   Thyroid Peroxidase Antibody    Collection Time: 10/18/18  1:50 PM   Result Value Ref Range    THYROID PEROXIDASE (TPO) ABS 35 (H) <34 IU/mL   Cortisol AM    Collection Time: 10/18/18  1:50 PM   Result Value Ref Range    Cortisol - AM 10.2 4.3 -

## 2018-10-20 ENCOUNTER — APPOINTMENT (OUTPATIENT)
Dept: GENERAL RADIOLOGY | Age: 53
DRG: 005 | End: 2018-10-20
Payer: COMMERCIAL

## 2018-10-20 LAB
AMMONIA: 33 UMOL/L (ref 11–51)
ANION GAP SERPL CALCULATED.3IONS-SCNC: 10 MMOL/L (ref 3–16)
ANION GAP SERPL CALCULATED.3IONS-SCNC: 13 MMOL/L (ref 3–16)
BASE EXCESS ARTERIAL: 4.8 MMOL/L (ref -3–3)
BUN BLDV-MCNC: 23 MG/DL (ref 7–20)
BUN BLDV-MCNC: 24 MG/DL (ref 7–20)
CALCIUM SERPL-MCNC: 9 MG/DL (ref 8.3–10.6)
CALCIUM SERPL-MCNC: 9.3 MG/DL (ref 8.3–10.6)
CARBOXYHEMOGLOBIN ARTERIAL: 0 % (ref 0–1.5)
CHLORIDE BLD-SCNC: 105 MMOL/L (ref 99–110)
CHLORIDE BLD-SCNC: 99 MMOL/L (ref 99–110)
CO2: 27 MMOL/L (ref 21–32)
CO2: 30 MMOL/L (ref 21–32)
CREAT SERPL-MCNC: <0.5 MG/DL (ref 0.6–1.1)
CREAT SERPL-MCNC: <0.5 MG/DL (ref 0.6–1.1)
GFR AFRICAN AMERICAN: >60
GFR AFRICAN AMERICAN: >60
GFR NON-AFRICAN AMERICAN: >60
GFR NON-AFRICAN AMERICAN: >60
GLUCOSE BLD-MCNC: 118 MG/DL (ref 70–99)
GLUCOSE BLD-MCNC: 119 MG/DL (ref 70–99)
HCO3 ARTERIAL: 27.5 MMOL/L (ref 21–29)
HCT VFR BLD CALC: 37.3 % (ref 36–48)
HEMOGLOBIN, ART, EXTENDED: 13.3 G/DL (ref 12–16)
HEMOGLOBIN: 12.6 G/DL (ref 12–16)
MAGNESIUM: 1.9 MG/DL (ref 1.8–2.4)
MAGNESIUM: 2 MG/DL (ref 1.8–2.4)
MCH RBC QN AUTO: 33 PG (ref 26–34)
MCHC RBC AUTO-ENTMCNC: 33.8 G/DL (ref 31–36)
MCV RBC AUTO: 97.7 FL (ref 80–100)
METHEMOGLOBIN ARTERIAL: 0.4 %
O2 CONTENT ARTERIAL: 18 ML/DL
O2 SAT, ARTERIAL: 94 %
O2 THERAPY: ABNORMAL
PCO2 ARTERIAL: 34.5 MMHG (ref 35–45)
PDW BLD-RTO: 13.9 % (ref 12.4–15.4)
PH ARTERIAL: 7.52 (ref 7.35–7.45)
PHOSPHORUS: 2.6 MG/DL (ref 2.5–4.9)
PLATELET # BLD: 186 K/UL (ref 135–450)
PMV BLD AUTO: 9.4 FL (ref 5–10.5)
PO2 ARTERIAL: 65.5 MMHG (ref 75–108)
POTASSIUM REFLEX MAGNESIUM: 3.3 MMOL/L (ref 3.5–5.1)
POTASSIUM REFLEX MAGNESIUM: 3.4 MMOL/L (ref 3.5–5.1)
PRO-BNP: ABNORMAL PG/ML (ref 0–124)
RBC # BLD: 3.82 M/UL (ref 4–5.2)
SODIUM BLD-SCNC: 142 MMOL/L (ref 136–145)
SODIUM BLD-SCNC: 142 MMOL/L (ref 136–145)
TCO2 ARTERIAL: 28.5 MMOL/L
WBC # BLD: 14.7 K/UL (ref 4–11)

## 2018-10-20 PROCEDURE — 2060000000 HC ICU INTERMEDIATE R&B

## 2018-10-20 PROCEDURE — 2700000000 HC OXYGEN THERAPY PER DAY

## 2018-10-20 PROCEDURE — 2580000003 HC RX 258: Performed by: INTERNAL MEDICINE

## 2018-10-20 PROCEDURE — 94640 AIRWAY INHALATION TREATMENT: CPT

## 2018-10-20 PROCEDURE — 6370000000 HC RX 637 (ALT 250 FOR IP): Performed by: INTERNAL MEDICINE

## 2018-10-20 PROCEDURE — 94761 N-INVAS EAR/PLS OXIMETRY MLT: CPT

## 2018-10-20 PROCEDURE — 6360000002 HC RX W HCPCS: Performed by: INTERNAL MEDICINE

## 2018-10-20 PROCEDURE — 84100 ASSAY OF PHOSPHORUS: CPT

## 2018-10-20 PROCEDURE — 80048 BASIC METABOLIC PNL TOTAL CA: CPT

## 2018-10-20 PROCEDURE — 2500000003 HC RX 250 WO HCPCS: Performed by: INTERNAL MEDICINE

## 2018-10-20 PROCEDURE — 71045 X-RAY EXAM CHEST 1 VIEW: CPT

## 2018-10-20 PROCEDURE — 83880 ASSAY OF NATRIURETIC PEPTIDE: CPT

## 2018-10-20 PROCEDURE — 82803 BLOOD GASES ANY COMBINATION: CPT

## 2018-10-20 PROCEDURE — 2580000003 HC RX 258: Performed by: EMERGENCY MEDICINE

## 2018-10-20 PROCEDURE — 31720 CLEARANCE OF AIRWAYS: CPT

## 2018-10-20 PROCEDURE — 99291 CRITICAL CARE FIRST HOUR: CPT | Performed by: INTERNAL MEDICINE

## 2018-10-20 PROCEDURE — 83735 ASSAY OF MAGNESIUM: CPT

## 2018-10-20 PROCEDURE — 82140 ASSAY OF AMMONIA: CPT

## 2018-10-20 PROCEDURE — 85027 COMPLETE CBC AUTOMATED: CPT

## 2018-10-20 PROCEDURE — S0028 INJECTION, FAMOTIDINE, 20 MG: HCPCS | Performed by: INTERNAL MEDICINE

## 2018-10-20 PROCEDURE — 2580000003 HC RX 258

## 2018-10-20 PROCEDURE — 6370000000 HC RX 637 (ALT 250 FOR IP)

## 2018-10-20 RX ORDER — MAGNESIUM SULFATE IN WATER 40 MG/ML
2 INJECTION, SOLUTION INTRAVENOUS ONCE
Status: COMPLETED | OUTPATIENT
Start: 2018-10-20 | End: 2018-10-20

## 2018-10-20 RX ORDER — QUETIAPINE FUMARATE 25 MG/1
50 TABLET, FILM COATED ORAL 3 TIMES DAILY
Status: DISCONTINUED | OUTPATIENT
Start: 2018-10-20 | End: 2018-10-21

## 2018-10-20 RX ORDER — LEVALBUTEROL 1.25 MG/.5ML
1.25 SOLUTION, CONCENTRATE RESPIRATORY (INHALATION)
Status: DISCONTINUED | OUTPATIENT
Start: 2018-10-20 | End: 2018-10-22

## 2018-10-20 RX ORDER — FUROSEMIDE 10 MG/ML
40 INJECTION INTRAMUSCULAR; INTRAVENOUS ONCE
Status: COMPLETED | OUTPATIENT
Start: 2018-10-20 | End: 2018-10-20

## 2018-10-20 RX ORDER — LACTULOSE 10 G/15ML
30 SOLUTION ORAL 3 TIMES DAILY
Status: DISCONTINUED | OUTPATIENT
Start: 2018-10-20 | End: 2018-10-21

## 2018-10-20 RX ORDER — POTASSIUM CHLORIDE 29.8 MG/ML
20 INJECTION INTRAVENOUS
Status: COMPLETED | OUTPATIENT
Start: 2018-10-20 | End: 2018-10-20

## 2018-10-20 RX ORDER — FUROSEMIDE 10 MG/ML
40 INJECTION INTRAMUSCULAR; INTRAVENOUS EVERY 8 HOURS
Status: COMPLETED | OUTPATIENT
Start: 2018-10-20 | End: 2018-10-21

## 2018-10-20 RX ORDER — SODIUM CHLORIDE 9 MG/ML
INJECTION, SOLUTION INTRAVENOUS
Status: COMPLETED
Start: 2018-10-20 | End: 2018-10-20

## 2018-10-20 RX ORDER — METOPROLOL TARTRATE 5 MG/5ML
5 INJECTION INTRAVENOUS EVERY 6 HOURS
Status: DISCONTINUED | OUTPATIENT
Start: 2018-10-20 | End: 2018-10-23

## 2018-10-20 RX ORDER — CHLOROTHIAZIDE SODIUM 500 MG/1
500 INJECTION INTRAVENOUS ONCE
Status: COMPLETED | OUTPATIENT
Start: 2018-10-20 | End: 2018-10-20

## 2018-10-20 RX ADMIN — QUETIAPINE FUMARATE 50 MG: 25 TABLET ORAL at 15:34

## 2018-10-20 RX ADMIN — METOPROLOL TARTRATE 5 MG: 5 INJECTION, SOLUTION INTRAVENOUS at 21:43

## 2018-10-20 RX ADMIN — FUROSEMIDE 40 MG: 10 INJECTION, SOLUTION INTRAMUSCULAR; INTRAVENOUS at 17:35

## 2018-10-20 RX ADMIN — CEFEPIME HYDROCHLORIDE 2 G: 2 INJECTION, POWDER, FOR SOLUTION INTRAVENOUS at 12:33

## 2018-10-20 RX ADMIN — SODIUM CHLORIDE 250 ML: 9 INJECTION, SOLUTION INTRAVENOUS at 12:39

## 2018-10-20 RX ADMIN — LEVALBUTEROL 1.25 MG: 1.25 SOLUTION, CONCENTRATE RESPIRATORY (INHALATION) at 19:39

## 2018-10-20 RX ADMIN — LACTULOSE 30 G: 20 SOLUTION ORAL at 21:41

## 2018-10-20 RX ADMIN — CHLOROTHIAZIDE SODIUM 500 MG: 500 INJECTION, POWDER, LYOPHILIZED, FOR SOLUTION INTRAVENOUS at 10:46

## 2018-10-20 RX ADMIN — FUROSEMIDE 40 MG: 10 INJECTION, SOLUTION INTRAMUSCULAR; INTRAVENOUS at 10:45

## 2018-10-20 RX ADMIN — IPRATROPIUM BROMIDE AND ALBUTEROL SULFATE 1 AMPULE: .5; 3 SOLUTION RESPIRATORY (INHALATION) at 07:37

## 2018-10-20 RX ADMIN — LACTULOSE 30 G: 20 SOLUTION ORAL at 15:33

## 2018-10-20 RX ADMIN — METHYLNALTREXONE BROMIDE 12 MG: 12 INJECTION, SOLUTION SUBCUTANEOUS at 12:26

## 2018-10-20 RX ADMIN — RIFAXIMIN 550 MG: 550 TABLET ORAL at 10:45

## 2018-10-20 RX ADMIN — MAGNESIUM SULFATE HEPTAHYDRATE 2 G: 40 INJECTION, SOLUTION INTRAVENOUS at 10:47

## 2018-10-20 RX ADMIN — SODIUM CHLORIDE 250 ML: 9 INJECTION, SOLUTION INTRAVENOUS at 11:09

## 2018-10-20 RX ADMIN — DOCUSATE SODIUM 100 MG: 50 LIQUID ORAL at 21:41

## 2018-10-20 RX ADMIN — METOPROLOL TARTRATE 5 MG: 5 INJECTION, SOLUTION INTRAVENOUS at 10:44

## 2018-10-20 RX ADMIN — METOPROLOL TARTRATE 5 MG: 5 INJECTION, SOLUTION INTRAVENOUS at 15:34

## 2018-10-20 RX ADMIN — IPRATROPIUM BROMIDE AND ALBUTEROL SULFATE 1 AMPULE: .5; 3 SOLUTION RESPIRATORY (INHALATION) at 00:40

## 2018-10-20 RX ADMIN — MUPIROCIN: 20 OINTMENT TOPICAL at 21:43

## 2018-10-20 RX ADMIN — RIFAXIMIN 550 MG: 550 TABLET ORAL at 21:41

## 2018-10-20 RX ADMIN — ENOXAPARIN SODIUM 40 MG: 40 INJECTION SUBCUTANEOUS at 10:43

## 2018-10-20 RX ADMIN — POTASSIUM CHLORIDE 20 MEQ: 29.8 INJECTION, SOLUTION INTRAVENOUS at 11:25

## 2018-10-20 RX ADMIN — MUPIROCIN: 20 OINTMENT TOPICAL at 10:43

## 2018-10-20 RX ADMIN — POTASSIUM CHLORIDE 20 MEQ: 29.8 INJECTION, SOLUTION INTRAVENOUS at 13:22

## 2018-10-20 RX ADMIN — POTASSIUM CHLORIDE 10 MEQ: 7.46 INJECTION, SOLUTION INTRAVENOUS at 23:19

## 2018-10-20 RX ADMIN — CEFEPIME HYDROCHLORIDE 2 G: 2 INJECTION, POWDER, FOR SOLUTION INTRAVENOUS at 21:00

## 2018-10-20 RX ADMIN — FAMOTIDINE 20 MG: 10 INJECTION, SOLUTION INTRAVENOUS at 10:45

## 2018-10-20 RX ADMIN — DEXMEDETOMIDINE HYDROCHLORIDE 0.5 MCG/KG/HR: 100 INJECTION, SOLUTION INTRAVENOUS at 05:11

## 2018-10-20 RX ADMIN — FOLIC ACID 1 MG: 1 TABLET ORAL at 10:45

## 2018-10-20 RX ADMIN — Medication 10 ML: at 11:23

## 2018-10-20 RX ADMIN — LEVALBUTEROL 1.25 MG: 1.25 SOLUTION, CONCENTRATE RESPIRATORY (INHALATION) at 16:00

## 2018-10-20 RX ADMIN — DOXYCYCLINE 100 MG: 100 INJECTION, POWDER, LYOPHILIZED, FOR SOLUTION INTRAVENOUS at 18:23

## 2018-10-20 RX ADMIN — DOXYCYCLINE 100 MG: 100 INJECTION, POWDER, LYOPHILIZED, FOR SOLUTION INTRAVENOUS at 08:23

## 2018-10-20 RX ADMIN — Medication 10 ML: at 11:22

## 2018-10-20 RX ADMIN — Medication 10 ML: at 21:44

## 2018-10-20 RX ADMIN — IPRATROPIUM BROMIDE AND ALBUTEROL SULFATE 1 AMPULE: .5; 3 SOLUTION RESPIRATORY (INHALATION) at 04:03

## 2018-10-20 RX ADMIN — Medication: at 10:46

## 2018-10-20 RX ADMIN — QUETIAPINE FUMARATE 50 MG: 25 TABLET ORAL at 21:42

## 2018-10-20 RX ADMIN — FAMOTIDINE 20 MG: 10 INJECTION, SOLUTION INTRAVENOUS at 21:43

## 2018-10-20 RX ADMIN — THIAMINE HYDROCHLORIDE 100 MG: 100 INJECTION, SOLUTION INTRAMUSCULAR; INTRAVENOUS at 13:34

## 2018-10-20 RX ADMIN — LEVALBUTEROL 1.25 MG: 1.25 SOLUTION, CONCENTRATE RESPIRATORY (INHALATION) at 11:46

## 2018-10-20 RX ADMIN — DOCUSATE SODIUM 100 MG: 50 LIQUID ORAL at 10:45

## 2018-10-20 RX ADMIN — LACTULOSE 30 G: 20 SOLUTION ORAL at 10:44

## 2018-10-20 ASSESSMENT — PAIN SCALES - GENERAL: PAINLEVEL_OUTOF10: 0

## 2018-10-20 NOTE — PROGRESS NOTES
Bedside handoff done with nightshift RN. HOB elevated 60 degrees. Crackles noted throughout all lung fields. TF off. Prece dex off at 6 am per Night RN. Waiting on Neurology MD to see patient and discuss current condition with daughter.  sleeping on couch.  Markie Colon

## 2018-10-20 NOTE — PROGRESS NOTES
chance that she may not clinically improve from her current state. 7. Case discussed with Dr. Romero Garcia as well.   8. >35 minutes spent with patient, including chart review, discussion with and examination of the patient, documentation, etc.    --  Thank you for allowing me to participate in the care of your patient. If I can be of any further assistance, please do not hesitate to contact me. Chuck Virgen, DO  [address] [place] [address] [phone] Neuroscience

## 2018-10-20 NOTE — CONSULTS
Quetiapine Liquid Formulation Request    Quetiapine is not commercially available as a liquid for [place] administration. Nasogastric/enteral tube (off-label route): Hold tube feeds for 30 minutes before administration; flush with 25 mL of sterile water. Crush dose using immediate-release formulation, mix in 10 mL water and administer via NG/enteral tube; follow with a 50 mL flush of sterile water. Lorelei Gutierrez PharmD, BCPS   10/20/2018 12:33 PM

## 2018-10-20 NOTE — PROGRESS NOTES
Hospitalist Progress Note      PCP: Simi Lowe MD    Date of Admission: 10/14/2018       Chief Peggy 45 Course:       Subjective:  Remains encephalopathic and obtunded, family currently at bedside, inc'd resp congestion noted      Medications:  Reviewed    Infusion Medications    dexmedetomidine (PRECEDEX) IV infusion 0.5 mcg/kg/hr (10/20/18 0511)     Scheduled Medications    metoprolol  5 mg Intravenous Q6H    levalbuterol  1.25 mg Nebulization Q4H WA    methylnaltrexone  12 mg Subcutaneous Once    lactulose  30 g Per NG tube TID    rifaximin  550 mg Per NG tube BID    potassium chloride  20 mEq Intravenous Q1H    magnesium sulfate  2 g Intravenous Once    furosemide  40 mg Intravenous Once    chlorothiazide  500 mg Intravenous Once    sodium chloride flush  10 mL Intravenous 2 times per day    folic acid  1 mg Oral Daily    thiamine (VITAMIN B1) IVPB  100 mg Intravenous Q24H    cefepime  2 g Intravenous Q12H    docusate  100 mg Per NG tube BID    influenza virus vaccine  0.5 mL Intramuscular Once    famotidine (PEPCID) injection  20 mg Intravenous BID    sodium chloride flush  10 mL Intravenous 2 times per day    enoxaparin  40 mg Subcutaneous Daily    doxycycline (VIBRAMYCIN) IV  100 mg Intravenous Q12H    mupirocin   Topical BID     PRN Meds: sodium chloride flush, acetaminophen, labetalol, acetaminophen, ipratropium-albuterol, sodium chloride flush, magnesium hydroxide, ondansetron, potassium chloride **OR** potassium chloride **OR** potassium chloride, magnesium sulfate      Intake/Output Summary (Last 24 hours) at 10/20/18 0844  Last data filed at 10/20/18 0644   Gross per 24 hour   Intake             5202 ml   Output             1510 ml   Net             3692 ml       Physical Exam Performed:    BP (!) 151/120   Pulse 103   Temp 99.2 °F (37.3 °C) (Core)   Resp 25   Ht 5' 8\" (1.727 m)   Wt 231 lb 0.7 oz (104.8 kg)   SpO2 93%   BMI 35.13 kg/m² General appearance:  No apparent distress, appears stated age and cooperative. obtunded  HEENT:  Normal cephalic, atraumatic without obvious deformity. Pupils equal, round, and reactive to light.  Extra ocular muscles intact. Conjunctivae/corneas clear. Neck: Supple, with full range of motion. No jugular venous distention. Trachea midline. Respiratory:  Normal respiratory effort. Clear to auscultation, bilaterally without Rales/Wheezes, noted scattered Rhonchi. Cardiovascular:  Regular rate and rhythm with normal S1/S2 without murmurs, rubs or gallops. Abdomen: Soft, non-tender, non-distended with normal bowel sounds. Musculoskeletal:  No clubbing, cyanosis or edema bilaterally.  Full range of motion without deformity. Skin: Skin color, texture, turgor normal.  No rashes or lesions. Neurologic:  Cannot test well, as pt encephalopathic, dilated pupils(right does not have good consensual response)  Psychiatric:  Alert and oriented, thought content appropriate, normal insight  Capillary Refill: Brisk,< 3 seconds   Peripheral Pulses: +2 palpable, equal bilaterally       Labs:   Recent Labs      10/18/18   0522  10/19/18   0445  10/20/18   0530   WBC  14.8*  12.9*  14.7*   HGB  13.6  12.7  12.6   HCT  40.3  38.4  37.3   PLT  212  192  186     Recent Labs      10/18/18   0522  10/19/18   0445  10/20/18   0530   NA  145  141  142   K  3.6  3.4*  3.3*   CL  106  102  105   CO2  27  27  27   BUN  24*  24*  23*   CREATININE  <0.5*  <0.5*  <0.5*   CALCIUM  9.5  8.9  9.0   PHOS  2.3*  2.6  2.6     No results for input(s): AST, ALT, BILIDIR, BILITOT, ALKPHOS in the last 72 hours. No results for input(s): INR in the last 72 hours. No results for input(s): Aloma Jovan in the last 72 hours.     Urinalysis:    Lab Results   Component Value Date    NITRU Negative 10/14/2018    WBCUA 3-5 10/14/2018    BACTERIA 3+ 10/14/2018    RBCUA 20-50 10/14/2018    BLOODU MODERATE 10/14/2018    SPECGRAV >=1.030 10/14/2018 Spondylosis with no definite fracture. XR CHEST PORTABLE   Final Result   Status post placement of left internal jugular central venous catheter,   without gross pneumothorax. Mild pulmonary edema. Left perihilar atelectasis. XR CHEST PORTABLE   Final Result   Cardiomegaly with mild vascular indistinctness, either due to low lung   volumes or mild edema. Subtle retrocardiac opacity remains on the left         XR ABDOMEN (KUB) (SINGLE AP VIEW)   Final Result   Chest: The endotracheal tube tip is 1-2 cm above the rojas. The tube could   be withdrawn 1 cm. Abdomen: The enteric tube is in good position in the stomach which is   distended. XR CHEST PORTABLE   Final Result   Chest: The endotracheal tube tip is 1-2 cm above the rojas. The tube could   be withdrawn 1 cm. Abdomen: The enteric tube is in good position in the stomach which is   distended.          XR CHEST PORTABLE    (Results Pending)           Assessment/Plan:    Active Hospital Problems    Diagnosis Date Noted    Asthma [J45.909]      Priority: Low    Abnormal echocardiogram [R93.1]     Respiratory arrest ([address] [R09.2] 10/14/2018    V-tach ([address] [I47.2] 10/14/2018    Sepsis ([address] [A41.9] 10/14/2018    Respiratory arrest before cardiac arrest ([address] [I46.9, R09.2] 10/14/2018    Cardiac arrest with ventricular fibrillation (HCC) [I46.9, I49.01] 10/14/2018    Respiratory failure with hypoxia and hypercapnia (HCC) [J96.91, J96.92]      Acute resp failure- unclear etiology, possibly from asthma or cardiac etiology  -intubated, mgmt per pulm/cc apprec recs  -vanc/zosyn x 1 given  -iv rocephin/doxycycline started for possible resp infection, adjusted as needed     Cardiac arrest- possibly from hypoxia, but pt did also apparently have vfib, ekg did note some ischemia but no ST elevation  -echo ordered(ef 40%, HK of inf/anteroseptal/septal walls, severe LV dilation)  -cards consulted, apprec recs, plan for

## 2018-10-21 ENCOUNTER — APPOINTMENT (OUTPATIENT)
Dept: GENERAL RADIOLOGY | Age: 53
DRG: 005 | End: 2018-10-21
Payer: COMMERCIAL

## 2018-10-21 LAB
ANION GAP SERPL CALCULATED.3IONS-SCNC: 14 MMOL/L (ref 3–16)
BUN BLDV-MCNC: 24 MG/DL (ref 7–20)
CALCIUM SERPL-MCNC: 9.7 MG/DL (ref 8.3–10.6)
CHLORIDE BLD-SCNC: 98 MMOL/L (ref 99–110)
CO2: 32 MMOL/L (ref 21–32)
CREAT SERPL-MCNC: <0.5 MG/DL (ref 0.6–1.1)
GFR AFRICAN AMERICAN: >60
GFR NON-AFRICAN AMERICAN: >60
GLUCOSE BLD-MCNC: 122 MG/DL (ref 70–99)
HCT VFR BLD CALC: 45.3 % (ref 36–48)
HEMOGLOBIN: 15.2 G/DL (ref 12–16)
MCH RBC QN AUTO: 33 PG (ref 26–34)
MCHC RBC AUTO-ENTMCNC: 33.5 G/DL (ref 31–36)
MCV RBC AUTO: 98.6 FL (ref 80–100)
METHYLMALONIC ACID: 0.21 UMOL/L (ref 0–0.4)
PDW BLD-RTO: 13.9 % (ref 12.4–15.4)
PHOSPHORUS: 4.2 MG/DL (ref 2.5–4.9)
PLATELET # BLD: 250 K/UL (ref 135–450)
PMV BLD AUTO: 9.7 FL (ref 5–10.5)
POTASSIUM REFLEX MAGNESIUM: 3.8 MMOL/L (ref 3.5–5.1)
RBC # BLD: 4.59 M/UL (ref 4–5.2)
SODIUM BLD-SCNC: 144 MMOL/L (ref 136–145)
WBC # BLD: 18.5 K/UL (ref 4–11)

## 2018-10-21 PROCEDURE — 80048 BASIC METABOLIC PNL TOTAL CA: CPT

## 2018-10-21 PROCEDURE — 85027 COMPLETE CBC AUTOMATED: CPT

## 2018-10-21 PROCEDURE — 6370000000 HC RX 637 (ALT 250 FOR IP): Performed by: INTERNAL MEDICINE

## 2018-10-21 PROCEDURE — 6360000002 HC RX W HCPCS: Performed by: INTERNAL MEDICINE

## 2018-10-21 PROCEDURE — 84100 ASSAY OF PHOSPHORUS: CPT

## 2018-10-21 PROCEDURE — 2580000003 HC RX 258: Performed by: INTERNAL MEDICINE

## 2018-10-21 PROCEDURE — 2500000003 HC RX 250 WO HCPCS: Performed by: INTERNAL MEDICINE

## 2018-10-21 PROCEDURE — 99233 SBSQ HOSP IP/OBS HIGH 50: CPT | Performed by: INTERNAL MEDICINE

## 2018-10-21 PROCEDURE — 2580000003 HC RX 258

## 2018-10-21 PROCEDURE — 2060000000 HC ICU INTERMEDIATE R&B

## 2018-10-21 PROCEDURE — 71045 X-RAY EXAM CHEST 1 VIEW: CPT

## 2018-10-21 PROCEDURE — S0028 INJECTION, FAMOTIDINE, 20 MG: HCPCS | Performed by: INTERNAL MEDICINE

## 2018-10-21 PROCEDURE — 94761 N-INVAS EAR/PLS OXIMETRY MLT: CPT

## 2018-10-21 PROCEDURE — 2700000000 HC OXYGEN THERAPY PER DAY

## 2018-10-21 PROCEDURE — 94640 AIRWAY INHALATION TREATMENT: CPT

## 2018-10-21 RX ORDER — CHLOROTHIAZIDE SODIUM 500 MG/1
500 INJECTION INTRAVENOUS 2 TIMES DAILY
Status: COMPLETED | OUTPATIENT
Start: 2018-10-21 | End: 2018-10-21

## 2018-10-21 RX ORDER — FUROSEMIDE 10 MG/ML
40 INJECTION INTRAMUSCULAR; INTRAVENOUS EVERY 8 HOURS
Status: COMPLETED | OUTPATIENT
Start: 2018-10-21 | End: 2018-10-22

## 2018-10-21 RX ORDER — SODIUM CHLORIDE 9 MG/ML
INJECTION, SOLUTION INTRAVENOUS
Status: COMPLETED
Start: 2018-10-21 | End: 2018-10-21

## 2018-10-21 RX ADMIN — FOLIC ACID 1 MG: 1 TABLET ORAL at 08:57

## 2018-10-21 RX ADMIN — METOPROLOL TARTRATE 5 MG: 5 INJECTION, SOLUTION INTRAVENOUS at 08:58

## 2018-10-21 RX ADMIN — LEVALBUTEROL 1.25 MG: 1.25 SOLUTION, CONCENTRATE RESPIRATORY (INHALATION) at 16:23

## 2018-10-21 RX ADMIN — LACTULOSE 30 G: 20 SOLUTION ORAL at 08:57

## 2018-10-21 RX ADMIN — CEFEPIME HYDROCHLORIDE 2 G: 2 INJECTION, POWDER, FOR SOLUTION INTRAVENOUS at 20:53

## 2018-10-21 RX ADMIN — RIFAXIMIN 550 MG: 550 TABLET ORAL at 08:57

## 2018-10-21 RX ADMIN — CHLOROTHIAZIDE SODIUM 500 MG: 500 INJECTION, POWDER, LYOPHILIZED, FOR SOLUTION INTRAVENOUS at 20:53

## 2018-10-21 RX ADMIN — MUPIROCIN: 20 OINTMENT TOPICAL at 20:54

## 2018-10-21 RX ADMIN — CEFEPIME HYDROCHLORIDE 2 G: 2 INJECTION, POWDER, FOR SOLUTION INTRAVENOUS at 08:58

## 2018-10-21 RX ADMIN — QUETIAPINE FUMARATE 50 MG: 25 TABLET ORAL at 08:57

## 2018-10-21 RX ADMIN — CHLOROTHIAZIDE SODIUM 500 MG: 500 INJECTION, POWDER, LYOPHILIZED, FOR SOLUTION INTRAVENOUS at 12:25

## 2018-10-21 RX ADMIN — DOCUSATE SODIUM 100 MG: 50 LIQUID ORAL at 08:57

## 2018-10-21 RX ADMIN — FUROSEMIDE 40 MG: 10 INJECTION, SOLUTION INTRAMUSCULAR; INTRAVENOUS at 12:25

## 2018-10-21 RX ADMIN — ACETAMINOPHEN 650 MG: 325 TABLET ORAL at 15:05

## 2018-10-21 RX ADMIN — LEVALBUTEROL 1.25 MG: 1.25 SOLUTION, CONCENTRATE RESPIRATORY (INHALATION) at 12:38

## 2018-10-21 RX ADMIN — POTASSIUM CHLORIDE 10 MEQ: 7.46 INJECTION, SOLUTION INTRAVENOUS at 01:04

## 2018-10-21 RX ADMIN — MUPIROCIN: 20 OINTMENT TOPICAL at 08:58

## 2018-10-21 RX ADMIN — THIAMINE HYDROCHLORIDE 100 MG: 100 INJECTION, SOLUTION INTRAMUSCULAR; INTRAVENOUS at 11:00

## 2018-10-21 RX ADMIN — METOPROLOL TARTRATE 5 MG: 5 INJECTION, SOLUTION INTRAVENOUS at 04:00

## 2018-10-21 RX ADMIN — FAMOTIDINE 20 MG: 10 INJECTION, SOLUTION INTRAVENOUS at 20:53

## 2018-10-21 RX ADMIN — LEVALBUTEROL 1.25 MG: 1.25 SOLUTION, CONCENTRATE RESPIRATORY (INHALATION) at 19:18

## 2018-10-21 RX ADMIN — DOCUSATE SODIUM 100 MG: 50 LIQUID ORAL at 20:53

## 2018-10-21 RX ADMIN — SODIUM CHLORIDE: 9 INJECTION, SOLUTION INTRAVENOUS at 09:37

## 2018-10-21 RX ADMIN — LEVALBUTEROL 1.25 MG: 1.25 SOLUTION, CONCENTRATE RESPIRATORY (INHALATION) at 09:12

## 2018-10-21 RX ADMIN — FUROSEMIDE 40 MG: 10 INJECTION, SOLUTION INTRAMUSCULAR; INTRAVENOUS at 21:52

## 2018-10-21 RX ADMIN — POTASSIUM CHLORIDE 10 MEQ: 7.46 INJECTION, SOLUTION INTRAVENOUS at 02:32

## 2018-10-21 RX ADMIN — FUROSEMIDE 40 MG: 10 INJECTION, SOLUTION INTRAMUSCULAR; INTRAVENOUS at 08:57

## 2018-10-21 RX ADMIN — FUROSEMIDE 40 MG: 10 INJECTION, SOLUTION INTRAMUSCULAR; INTRAVENOUS at 03:00

## 2018-10-21 RX ADMIN — ENOXAPARIN SODIUM 40 MG: 40 INJECTION SUBCUTANEOUS at 08:59

## 2018-10-21 RX ADMIN — ACETAMINOPHEN 650 MG: 650 SUPPOSITORY RECTAL at 11:03

## 2018-10-21 RX ADMIN — METOPROLOL TARTRATE 5 MG: 5 INJECTION, SOLUTION INTRAVENOUS at 20:53

## 2018-10-21 RX ADMIN — FAMOTIDINE 20 MG: 10 INJECTION, SOLUTION INTRAVENOUS at 08:57

## 2018-10-21 RX ADMIN — LABETALOL HYDROCHLORIDE 20 MG: 5 INJECTION INTRAVENOUS at 06:10

## 2018-10-21 RX ADMIN — Medication 10 ML: at 08:59

## 2018-10-21 RX ADMIN — Medication 10 ML: at 20:53

## 2018-10-21 ASSESSMENT — PAIN SCALES - GENERAL
PAINLEVEL_OUTOF10: 0
PAINLEVEL_OUTOF10: 0
PAINLEVEL_OUTOF10: 4

## 2018-10-21 NOTE — PROGRESS NOTES
injury  -neuro consulted to assist in workup   -EEG ordered(poor study due to signif artifact, mod severe encephalopathy, no definite epileptiform activity appreciated)  -had initial code status discussion with family 10/21, pending further family input   -palliative care consulted as well    HTN-held acei for now     Asthma/COPD- on iv steroids, duonebs, pt still smokes  -resume home meds when more stable     Anxiety- held meds initially  -off sedation currently     Chronic back pain- with hx of back surgery  -will need to resume pain meds when alert       DVT Prophylaxis: lovenox  Diet: DIET TUBE FEED CONTINUOUS/CYCLIC NPO; Low Calorie High Protein (Vital );  Orogastric; Continuous; 10; 60  Code Status: Full Code    PT/OT Eval Status: not possible    Dispo - pending palliative care kal sánchez MD

## 2018-10-21 NOTE — PLAN OF CARE
Problem: Falls - Risk of:  Goal: Absence of physical injury  Absence of physical injury   Outcome: Ongoing      Problem: Urinary Elimination:  Goal: Signs and symptoms of infection will decrease  Signs and symptoms of infection will decrease   Outcome: Ongoing

## 2018-10-21 NOTE — PROGRESS NOTES
Patient's tube feeds stopped in the ICU prior to transfer to  due to fluid overload. Confirmed with Dr. Saleem Adams to hold tube feeds until hospitalist re-evaluates patient tomorrow.

## 2018-10-22 ENCOUNTER — APPOINTMENT (OUTPATIENT)
Dept: GENERAL RADIOLOGY | Age: 53
DRG: 005 | End: 2018-10-22
Payer: COMMERCIAL

## 2018-10-22 PROBLEM — R91.8 PULMONARY INFILTRATE: Status: ACTIVE | Noted: 2018-10-22

## 2018-10-22 PROBLEM — I42.9 CARDIOMYOPATHY (HCC): Status: ACTIVE | Noted: 2018-10-22

## 2018-10-22 PROBLEM — J98.11 ATELECTASIS: Status: ACTIVE | Noted: 2018-10-22

## 2018-10-22 PROBLEM — E44.1 MILD MALNUTRITION (HCC): Chronic | Status: ACTIVE | Noted: 2018-10-22

## 2018-10-22 LAB
ANION GAP SERPL CALCULATED.3IONS-SCNC: 17 MMOL/L (ref 3–16)
BACTERIA: ABNORMAL /HPF
BILIRUBIN URINE: NEGATIVE
BLOOD, URINE: NEGATIVE
BUN BLDV-MCNC: 40 MG/DL (ref 7–20)
CALCIUM SERPL-MCNC: 9.6 MG/DL (ref 8.3–10.6)
CASTS 2: ABNORMAL /LPF
CASTS: ABNORMAL /LPF
CHLORIDE BLD-SCNC: 94 MMOL/L (ref 99–110)
CLARITY: CLEAR
CO2: 31 MMOL/L (ref 21–32)
COLOR: YELLOW
CREAT SERPL-MCNC: 0.8 MG/DL (ref 0.6–1.1)
GFR AFRICAN AMERICAN: >60
GFR NON-AFRICAN AMERICAN: >60
GLUCOSE BLD-MCNC: 117 MG/DL (ref 70–99)
GLUCOSE BLD-MCNC: 118 MG/DL (ref 70–99)
GLUCOSE BLD-MCNC: 122 MG/DL (ref 70–99)
GLUCOSE BLD-MCNC: 125 MG/DL (ref 70–99)
GLUCOSE BLD-MCNC: 128 MG/DL (ref 70–99)
GLUCOSE URINE: NEGATIVE MG/DL
HCT VFR BLD CALC: 47.5 % (ref 36–48)
HEMOGLOBIN: 15.8 G/DL (ref 12–16)
KETONES, URINE: ABNORMAL MG/DL
LEUKOCYTE ESTERASE, URINE: NEGATIVE
MCH RBC QN AUTO: 32.6 PG (ref 26–34)
MCHC RBC AUTO-ENTMCNC: 33.3 G/DL (ref 31–36)
MCV RBC AUTO: 97.9 FL (ref 80–100)
MICROSCOPIC EXAMINATION: YES
NITRITE, URINE: NEGATIVE
PDW BLD-RTO: 14.2 % (ref 12.4–15.4)
PERFORMED ON: ABNORMAL
PH UA: 5.5
PLATELET # BLD: 355 K/UL (ref 135–450)
PMV BLD AUTO: 9.3 FL (ref 5–10.5)
POTASSIUM REFLEX MAGNESIUM: 4.7 MMOL/L (ref 3.5–5.1)
PROTEIN UA: 100 MG/DL
RBC # BLD: 4.85 M/UL (ref 4–5.2)
RBC UA: ABNORMAL /HPF (ref 0–2)
SODIUM BLD-SCNC: 142 MMOL/L (ref 136–145)
SPECIFIC GRAVITY UA: >=1.03
UROBILINOGEN, URINE: 0.2 E.U./DL
WBC # BLD: 16 K/UL (ref 4–11)
WBC UA: ABNORMAL /HPF (ref 0–5)
YEAST: PRESENT /HPF

## 2018-10-22 PROCEDURE — 36415 COLL VENOUS BLD VENIPUNCTURE: CPT

## 2018-10-22 PROCEDURE — 71045 X-RAY EXAM CHEST 1 VIEW: CPT

## 2018-10-22 PROCEDURE — 6360000002 HC RX W HCPCS: Performed by: INTERNAL MEDICINE

## 2018-10-22 PROCEDURE — 2500000003 HC RX 250 WO HCPCS: Performed by: INTERNAL MEDICINE

## 2018-10-22 PROCEDURE — 94640 AIRWAY INHALATION TREATMENT: CPT

## 2018-10-22 PROCEDURE — 6370000000 HC RX 637 (ALT 250 FOR IP): Performed by: INTERNAL MEDICINE

## 2018-10-22 PROCEDURE — 99233 SBSQ HOSP IP/OBS HIGH 50: CPT | Performed by: INTERNAL MEDICINE

## 2018-10-22 PROCEDURE — 87040 BLOOD CULTURE FOR BACTERIA: CPT

## 2018-10-22 PROCEDURE — 80048 BASIC METABOLIC PNL TOTAL CA: CPT

## 2018-10-22 PROCEDURE — 36592 COLLECT BLOOD FROM PICC: CPT

## 2018-10-22 PROCEDURE — 2700000000 HC OXYGEN THERAPY PER DAY

## 2018-10-22 PROCEDURE — 85027 COMPLETE CBC AUTOMATED: CPT

## 2018-10-22 PROCEDURE — 2580000003 HC RX 258: Performed by: INTERNAL MEDICINE

## 2018-10-22 PROCEDURE — 6360000002 HC RX W HCPCS: Performed by: HOSPITALIST

## 2018-10-22 PROCEDURE — 94761 N-INVAS EAR/PLS OXIMETRY MLT: CPT

## 2018-10-22 PROCEDURE — 6370000000 HC RX 637 (ALT 250 FOR IP)

## 2018-10-22 PROCEDURE — S0028 INJECTION, FAMOTIDINE, 20 MG: HCPCS | Performed by: INTERNAL MEDICINE

## 2018-10-22 PROCEDURE — 99233 SBSQ HOSP IP/OBS HIGH 50: CPT | Performed by: PSYCHIATRY & NEUROLOGY

## 2018-10-22 PROCEDURE — 2060000000 HC ICU INTERMEDIATE R&B

## 2018-10-22 PROCEDURE — 81001 URINALYSIS AUTO W/SCOPE: CPT

## 2018-10-22 RX ORDER — LEVALBUTEROL 1.25 MG/.5ML
1.25 SOLUTION, CONCENTRATE RESPIRATORY (INHALATION) EVERY 8 HOURS
Status: DISCONTINUED | OUTPATIENT
Start: 2018-10-23 | End: 2018-10-24

## 2018-10-22 RX ORDER — LEVALBUTEROL 1.25 MG/.5ML
1.25 SOLUTION, CONCENTRATE RESPIRATORY (INHALATION) EVERY 8 HOURS
Status: DISCONTINUED | OUTPATIENT
Start: 2018-10-23 | End: 2018-10-22

## 2018-10-22 RX ADMIN — Medication: at 16:37

## 2018-10-22 RX ADMIN — MUPIROCIN: 20 OINTMENT TOPICAL at 22:20

## 2018-10-22 RX ADMIN — MUPIROCIN: 20 OINTMENT TOPICAL at 10:30

## 2018-10-22 RX ADMIN — ALTEPLASE 1 MG: 2.2 INJECTION, POWDER, LYOPHILIZED, FOR SOLUTION INTRAVENOUS at 05:36

## 2018-10-22 RX ADMIN — METOPROLOL TARTRATE 5 MG: 5 INJECTION, SOLUTION INTRAVENOUS at 10:29

## 2018-10-22 RX ADMIN — METOPROLOL TARTRATE 5 MG: 5 INJECTION, SOLUTION INTRAVENOUS at 22:12

## 2018-10-22 RX ADMIN — FAMOTIDINE 20 MG: 10 INJECTION, SOLUTION INTRAVENOUS at 10:29

## 2018-10-22 RX ADMIN — METOPROLOL TARTRATE 5 MG: 5 INJECTION, SOLUTION INTRAVENOUS at 04:57

## 2018-10-22 RX ADMIN — ACETAMINOPHEN 650 MG: 650 SUPPOSITORY RECTAL at 10:58

## 2018-10-22 RX ADMIN — LEVALBUTEROL 1.25 MG: 1.25 SOLUTION, CONCENTRATE RESPIRATORY (INHALATION) at 11:59

## 2018-10-22 RX ADMIN — LEVALBUTEROL 1.25 MG: 1.25 SOLUTION, CONCENTRATE RESPIRATORY (INHALATION) at 08:55

## 2018-10-22 RX ADMIN — THIAMINE HYDROCHLORIDE 100 MG: 100 INJECTION, SOLUTION INTRAMUSCULAR; INTRAVENOUS at 10:29

## 2018-10-22 RX ADMIN — DOCUSATE SODIUM 100 MG: 50 LIQUID ORAL at 10:27

## 2018-10-22 RX ADMIN — FUROSEMIDE 40 MG: 10 INJECTION, SOLUTION INTRAMUSCULAR; INTRAVENOUS at 04:57

## 2018-10-22 RX ADMIN — LEVALBUTEROL 1.25 MG: 1.25 SOLUTION, CONCENTRATE RESPIRATORY (INHALATION) at 16:25

## 2018-10-22 RX ADMIN — SODIUM CHLORIDE, PRESERVATIVE FREE 10 ML: 5 INJECTION INTRAVENOUS at 16:38

## 2018-10-22 RX ADMIN — DOCUSATE SODIUM 100 MG: 50 LIQUID ORAL at 22:11

## 2018-10-22 RX ADMIN — ENOXAPARIN SODIUM 40 MG: 40 INJECTION SUBCUTANEOUS at 10:27

## 2018-10-22 RX ADMIN — FOLIC ACID 1 MG: 1 TABLET ORAL at 10:27

## 2018-10-22 RX ADMIN — Medication 10 ML: at 10:28

## 2018-10-22 RX ADMIN — METOPROLOL TARTRATE 5 MG: 5 INJECTION, SOLUTION INTRAVENOUS at 16:37

## 2018-10-22 RX ADMIN — MEROPENEM 1 G: 1 INJECTION, POWDER, FOR SOLUTION INTRAVENOUS at 17:53

## 2018-10-22 RX ADMIN — ACETAMINOPHEN 650 MG: 650 SUPPOSITORY RECTAL at 17:48

## 2018-10-22 RX ADMIN — CEFEPIME HYDROCHLORIDE 2 G: 2 INJECTION, POWDER, FOR SOLUTION INTRAVENOUS at 10:28

## 2018-10-22 RX ADMIN — VANCOMYCIN HYDROCHLORIDE 1.5 G: 10 INJECTION, POWDER, LYOPHILIZED, FOR SOLUTION INTRAVENOUS at 14:14

## 2018-10-22 RX ADMIN — Medication 10 ML: at 22:11

## 2018-10-22 ASSESSMENT — PAIN SCALES - GENERAL
PAINLEVEL_OUTOF10: 0
PAINLEVEL_OUTOF10: 3
PAINLEVEL_OUTOF10: 3

## 2018-10-22 NOTE — PROGRESS NOTES
5.1 mmol/L 3.8 4.7   Chloride Latest Ref Range: 99 - 110 mmol/L 98 (L) 94 (L)   CO2 Latest Ref Range: 21 - 32 mmol/L 32 31   BUN Latest Ref Range: 7 - 20 mg/dL 24 (H) 40 (H)   Creatinine Latest Ref Range: 0.6 - 1.1 mg/dL <0.5 (L) 0.8   Anion Gap Latest Ref Range: 3 - 16  14 17 (H)   GFR Non- Latest Ref Range: >60  >60 >60   GFR  Latest Ref Range: >60  >60 >60   Glucose Latest Ref Range: 70 - 99 mg/dL 122 (H) 118 (H)   Calcium Latest Ref Range: 8.3 - 10.6 mg/dL 9.7 9.6   Phosphorus Latest Ref Range: 2.5 - 4.9 mg/dL 4.2      Results for Amy Agosto (MRN [id number]) as of 10/22/2018 17:08   Ref. Range 10/20/2018 05:30 10/21/2018 06:07 10/22/2018 04:55   WBC Latest Ref Range: 4.0 - 11.0 K/uL 14.7 (H) 18.5 (H) 16.0 (H)   RBC Latest Ref Range: 4.00 - 5.20 M/uL 3.82 (L) 4.59 4.85   Hemoglobin Quant Latest Ref Range: 12.0 - 16.0 g/dL 12.6 15.2 15.8   Hematocrit Latest Ref Range: 36.0 - 48.0 % 37.3 45.3 47.5   MCV Latest Ref Range: 80.0 - 100.0 fL 97.7 98.6 97.9   MCH Latest Ref Range: 26.0 - 34.0 pg 33.0 33.0 32.6   MCHC Latest Ref Range: 31.0 - 36.0 g/dL 33.8 33.5 33.3   MPV Latest Ref Range: 5.0 - 10.5 fL 9.4 9.7 9.3   RDW Latest Ref Range: 12.4 - 15.4 % 13.9 13.9 14.2   Platelet Count Latest Ref Range: 135 - 450 K/uL 186 250 355     Results for Amy Agosto (MRN [id number]) as of 10/22/2018 17:08   Ref.  Range 10/17/2018 13:35 10/17/2018 23:25 10/18/2018 05:22 10/19/2018 04:45 10/20/2018 04:43   O2 Therapy Unknown Unknown Unknown Unknown Unknown Unknown   Hemoglobin, Art, Extended Latest Ref Range: 12.0 - 16.0 g/dL 14.3 15.9 14.2 13.4 13.3   pH, Arterial Latest Ref Range: 7.350 - 7.450  7.467 (H) 7.437 7.482 (H) 7.518 (H) 7.519 (H)   pCO2, Arterial Latest Ref Range: 35.0 - 45.0 mmHg 32.2 (L) 36.5 37.9 36.3 34.5 (L)   pO2, Arterial Latest Ref Range: 75.0 - 108.0 mmHg 72.1 (L) 61.7 (L) 81.9 65.3 (L) 65.5 (L)   HCO3, Arterial Latest Ref Range: 21.0 - 29.0 mmol/L 22.8 24.1 27.7 28.8 27.5 TCO2 (calc), Art Latest Ref Range: Not Established mmol/L 23.7 25.2 28.9 30.0 28.5   Base Excess, Arterial Latest Ref Range: -3.0 - 3.0 mmol/L -0.1 0.3 4.2 (H) 5.9 (H) 4.8 (H)   O2 Sat, Arterial Latest Ref Range: >92 % 95.2 92.5 (L) 96.7 94.3 94.0   O2 Content, Arterial Latest Ref Range: Not Established mL/dL 19 20 19 18 18   Methemoglobin, Arterial Latest Ref Range: <1.5 % 0.4 0.2 0.3 0.3 0.4   Carboxyhgb, Arterial Latest Ref Range: 0.0 - 1.5 % 0.4 0.8 1.1 0.9 0.0       SINGLE XRAY VIEW OF THE CHEST       10/22/2018 4:21 am       COMPARISON:   10/21/2018       HISTORY:   ORDERING SYSTEM PROVIDED HISTORY: resp failure   TECHNOLOGIST PROVIDED HISTORY:   Reason for exam:->resp failure   Ordering Physician Provided Reason for Exam: resp failure   Type of Exam: Subsequent/Follow-up       FINDINGS:   Enteric tube extends below the diaphragm.  No change since prior.  Stable   right basilar opacity with small right pleural effusion.  Left lung is clear. No pneumothorax.  Mild improvement of interstitial pulmonary edema since   prior exam.  Cardiomediastinal silhouette and bony thorax are unchanged.           Impression   Mild improvement of previously seen CHF.  Stable small right pleural effusion   with right basilar atelectasis. MRI head-  Motion artifact degrades the images.       There is subtle area of signal abnormality on the diffusion-weighted images   involving the parietal and occipital lobes.  This may be artifactual as there   is no corresponding FLAIR or T2-weighted abnormality.  Mild ischemia cannot   be entirely excluded.  Clinically correlate.  Follow-up MRI could be   considered.  No other brain parenchymal abnormality. ECHO- Summary   -- The left ventricular systolic function is moderately reduced with an   ejection fraction of 40 %.   There is hypokinesis of the inferior, anteroseptal and septal walls. Left   ventricular cavity size is severely dilated.  Normal left ventricular   diastolic

## 2018-10-22 NOTE — PROGRESS NOTES
Nebulization Q4H WA Hernan Valdivia MD   1.25 mg at 10/22/18 1159    sodium chloride flush 0.9 % injection 10 mL  10 mL Intravenous 2 times per day Hernan Valdivia MD   10 mL at 10/22/18 1028    sodium chloride flush 0.9 % injection 10 mL  10 mL Intravenous PRN Hernan Valdivia MD        folic acid (FOLVITE) tablet 1 mg  1 mg Oral Daily Toña Triana MD   1 mg at 10/22/18 1027    thiamine (B-1) 100 mg in sodium chloride 0.9 % 100 mL IVPB  100 mg Intravenous Q24H Hernan Valdivia MD   Stopped at 10/22/18 1151    cefepime (MAXIPIME) 2 g IVPB minibag  2 g Intravenous Q12H Hernan Valdivia MD   Stopped at 10/22/18 1151    acetaminophen (TYLENOL) suppository 650 mg  650 mg Rectal Q4H PRN Hernan Valdivia MD   650 mg at 10/22/18 1058    docusate (COLACE) 50 MG/5ML liquid 100 mg  100 mg Per NG tube BID Hernan Valdivia MD   100 mg at 10/22/18 1027    labetalol (NORMODYNE;TRANDATE) injection 20 mg  20 mg Intravenous Q4H PRN Hernan Valdivia MD   20 mg at 10/21/18 0610    influenza quadrivalent split vaccine (FLUZONE;FLUARIX;FLULAVAL;AFLURIA) injection 0.5 mL  0.5 mL Intramuscular Once Toña Triana MD   Stopped at 10/18/18 1156    acetaminophen (TYLENOL) tablet 650 mg  650 mg Oral Q4H PRN Sony Richmond MD   650 mg at 10/21/18 1505    ipratropium-albuterol (DUONEB) nebulizer solution 1 ampule  1 ampule Inhalation Q4H PRN Toña Triana MD   1 ampule at 10/17/18 0336    famotidine (PEPCID) injection 20 mg  20 mg Intravenous BID Hernan Valdivia MD   20 mg at 10/22/18 1029    magnesium hydroxide (MILK OF MAGNESIA) 400 MG/5ML suspension 30 mL  30 mL Oral Daily PRN Sony Richmond MD        ondansetron [facility]) injection 4 mg  4 mg Intravenous Q6H PRN Sony Richmond MD        enoxaparin (LOVENOX) injection 40 mg  40 mg Subcutaneous Daily Sony Richmond MD   40 mg at 10/22/18 1027    potassium chloride (KLOR-CON M) extended release tablet 40 mEq  40 mEq Oral PRN Sony Richmond MD        Or   Fortunato Montieler >60 10/22/2018    GLUCOSE 118 10/22/2018    PHOS 4.2 10/21/2018    MG 2.00 10/20/2018    CALCIUM 9.6 10/22/2018     Lab Results   Component Value Date    WBC 16.0 10/22/2018    RBC 4.85 10/22/2018    HGB 15.8 10/22/2018    HCT 47.5 10/22/2018    MCV 97.9 10/22/2018    RDW 14.2 10/22/2018     10/22/2018     Lab Results   Component Value Date    INR 0.95 10/14/2018    PROTIME 10.8 10/14/2018     Neuroimaging and/or  labs reviewed by me and discussed results with her family. Impression:  Acute anoxic encephalopathy, status post cardiac arrest  Concern for persistent vegetative state  Hypertension  Acute respiratory failure, improving  Cardiac arrhythmia and V. fib arrest  PNA      Recommendation  Long discussion with her family today regarding prognosis and outcome. Poor prognosis  Concern for persistent vegetative state  Family would like to  continue with full support at this time. Would recommend GI consultation for PEG tube and DC planning  Family agreed with DNR  Continue antibiotics for possible aspiration pneumonia  GI and DVT prophylaxis  Continue nutrition  PT and OT  Blood pressure monitor  DC planning when medically stable  No further recommendation  We'll sign off           Alyssa Vogel MD   [phone]      This dictation was generated by voice recognition computer software. Although all attempts are made to edit the dictation for accuracy, there may be errors in the transcription that are not intended.

## 2018-10-22 NOTE — PROGRESS NOTES
calories, 77 grams of protein, 912 ml free water + water flush 150 ml every 4 hours  · Additional Calories:     · Anthropometric Measures:  · Ht: 5' 8\" (172.7 cm)   · Current Body Wt: 213 lb 6 oz (96.8 kg)  · Admission Body Wt: 220 lb (99.8 kg)  · Ideal Body Wt: 140 lb (63.5 kg), % Ideal Body    · BMI Classification: BMI 30.0 - 34.9 Obese Class I  · Comparative Standards (Estimated Nutrition Needs):  · Estimated Daily Total Kcal: 2890-1480  · Estimated Daily Protein (g):   · Estimated Daily Fluid (ml/day): Per MD 2/2 critical illness     Estimated Intake vs Estimated Needs: Intake Less Than Needs    Nutrition Risk Level: High    Nutrition Interventions:    (advance TF as tolerated per MD pending plan of care)  Continued Inpatient Monitoring    Nutrition Evaluation:   · Evaluation: Progress towards goals declining   · Goals: Tolerate most appropriate nutrition therapy to meet at least 50% of estimated needs   · Monitoring: NPO Status, Diet Progression, Constipation, Pertinent Labs, Mental Status/Confusion (improvements in resp status)    See Adult Nutrition Doc Flowsheet for more detail.      Electronically signed by Lamar Cooper, [address],  on 10/22/18 at 3:28 PM    Contact Number: Office: [phone]; [address]: [id number]

## 2018-10-22 NOTE — PROGRESS NOTES
Enteric tube going into the stomach. Cardiomegaly with pleural effusions and pulmonary edema suggesting CHF. XR ABDOMEN (KUB) (SINGLE AP VIEW)   Final Result   Feeding tube tip projects over the distal stomach in the peripyloric region. No enteric drainage tube noted. IR PICC WO SQ PORT/PUMP > 5 YEARS   Final Result   Successful placement of PICC line. XR CHEST PORTABLE   Final Result   New left lower lobe atelectasis or pneumonia. MRI BRAIN WO CONTRAST   Preliminary Result   Motion artifact degrades the images. There is subtle area of signal abnormality on the diffusion-weighted images   involving the parietal and occipital lobes. This may be artifactual as there   is no corresponding FLAIR or T2-weighted abnormality. Mild ischemia cannot   be entirely excluded. Clinically correlate. Follow-up MRI could be   considered. No other brain parenchymal abnormality. XR CHEST PORTABLE   Final Result   Interval extubation. Stable airspace opacification in the right lower lung   zone. XR CHEST PORTABLE   Final Result   Improved aeration in the right base. XR CHEST PORTABLE   Final Result   New right basilar and possible left lower lobe atelectasis and/or pneumonia. MRI BRAIN W WO CONTRAST   Final Result   1. No evidence of intracranial mass, acute ischemia, or edema. 2.  Mucosal thickening associated with right maxillary sinus and minimal   fluid signal associated with bilateral mastoid air cells. Clinical   correlation recommended for possibility of mastoiditis. XR CHEST PORTABLE   Final Result   1. Stable cardiomegaly with mild pulmonary edema. CT HEAD WO CONTRAST   Final Result   No acute intracranial abnormality. CT CERVICAL SPINE WO CONTRAST   Final Result   Spondylosis with no definite fracture.          XR CHEST PORTABLE   Final Result   Status post placement of left internal jugular central venous

## 2018-10-22 NOTE — PROGRESS NOTES
Pt is NPO and has NG placed. No orders placed to check Glucose levels. Currently NG has been held since yesterday because gurgling sound. Though they have continued to use it for medications. KUB? Continue to hold feed? Thank you! Paged Dr. Severa Courier. Awaiting response.  Moisés Veras RN

## 2018-10-22 NOTE — PLAN OF CARE
Problem: Nutrition  Goal: Optimal nutrition therapy  Outcome: Ongoing  Nutrition Problem: Inadequate energy intake  Intervention: Food and/or Nutrient Delivery:  (advance TF as tolerated per MD pending plan of care)  Nutritional Goals:  Tolerate most appropriate nutrition therapy to meet at least 50% of estimated needs

## 2018-10-23 ENCOUNTER — APPOINTMENT (OUTPATIENT)
Dept: GENERAL RADIOLOGY | Age: 53
DRG: 005 | End: 2018-10-23
Payer: COMMERCIAL

## 2018-10-23 LAB
ANION GAP SERPL CALCULATED.3IONS-SCNC: 14 MMOL/L (ref 3–16)
BUN BLDV-MCNC: 57 MG/DL (ref 7–20)
CALCIUM SERPL-MCNC: 10.7 MG/DL (ref 8.3–10.6)
CHLORIDE BLD-SCNC: 103 MMOL/L (ref 99–110)
CO2: 33 MMOL/L (ref 21–32)
CREAT SERPL-MCNC: 1 MG/DL (ref 0.6–1.1)
GFR AFRICAN AMERICAN: >60
GFR NON-AFRICAN AMERICAN: 58
GLUCOSE BLD-MCNC: 117 MG/DL (ref 70–99)
GLUCOSE BLD-MCNC: 127 MG/DL (ref 70–99)
GLUCOSE BLD-MCNC: 132 MG/DL (ref 70–99)
GLUCOSE BLD-MCNC: 141 MG/DL (ref 70–99)
GLUCOSE BLD-MCNC: 169 MG/DL (ref 70–99)
HCT VFR BLD CALC: 48 % (ref 36–48)
HEMOGLOBIN: 16 G/DL (ref 12–16)
MAGNESIUM: 2.7 MG/DL (ref 1.8–2.4)
MCH RBC QN AUTO: 32.9 PG (ref 26–34)
MCHC RBC AUTO-ENTMCNC: 33.3 G/DL (ref 31–36)
MCV RBC AUTO: 98.9 FL (ref 80–100)
PDW BLD-RTO: 14.1 % (ref 12.4–15.4)
PERFORMED ON: ABNORMAL
PLATELET # BLD: 393 K/UL (ref 135–450)
PMV BLD AUTO: 9.3 FL (ref 5–10.5)
POTASSIUM REFLEX MAGNESIUM: 3.4 MMOL/L (ref 3.5–5.1)
RBC # BLD: 4.85 M/UL (ref 4–5.2)
SODIUM BLD-SCNC: 150 MMOL/L (ref 136–145)
WBC # BLD: 18.3 K/UL (ref 4–11)

## 2018-10-23 PROCEDURE — 6370000000 HC RX 637 (ALT 250 FOR IP): Performed by: INTERNAL MEDICINE

## 2018-10-23 PROCEDURE — 71045 X-RAY EXAM CHEST 1 VIEW: CPT

## 2018-10-23 PROCEDURE — 6360000002 HC RX W HCPCS: Performed by: INTERNAL MEDICINE

## 2018-10-23 PROCEDURE — 94640 AIRWAY INHALATION TREATMENT: CPT

## 2018-10-23 PROCEDURE — 2580000003 HC RX 258: Performed by: INTERNAL MEDICINE

## 2018-10-23 PROCEDURE — 94664 DEMO&/EVAL PT USE INHALER: CPT

## 2018-10-23 PROCEDURE — 6360000002 HC RX W HCPCS: Performed by: NURSE PRACTITIONER

## 2018-10-23 PROCEDURE — 2500000003 HC RX 250 WO HCPCS: Performed by: INTERNAL MEDICINE

## 2018-10-23 PROCEDURE — 6370000000 HC RX 637 (ALT 250 FOR IP): Performed by: NURSE PRACTITIONER

## 2018-10-23 PROCEDURE — 83735 ASSAY OF MAGNESIUM: CPT

## 2018-10-23 PROCEDURE — 85027 COMPLETE CBC AUTOMATED: CPT

## 2018-10-23 PROCEDURE — 2580000003 HC RX 258

## 2018-10-23 PROCEDURE — S0028 INJECTION, FAMOTIDINE, 20 MG: HCPCS | Performed by: INTERNAL MEDICINE

## 2018-10-23 PROCEDURE — 2060000000 HC ICU INTERMEDIATE R&B

## 2018-10-23 PROCEDURE — 31720 CLEARANCE OF AIRWAYS: CPT

## 2018-10-23 PROCEDURE — 94761 N-INVAS EAR/PLS OXIMETRY MLT: CPT

## 2018-10-23 PROCEDURE — 80048 BASIC METABOLIC PNL TOTAL CA: CPT

## 2018-10-23 PROCEDURE — 2700000000 HC OXYGEN THERAPY PER DAY

## 2018-10-23 PROCEDURE — 99233 SBSQ HOSP IP/OBS HIGH 50: CPT | Performed by: INTERNAL MEDICINE

## 2018-10-23 RX ORDER — ACETAMINOPHEN 160 MG/5ML
650 SOLUTION ORAL EVERY 4 HOURS PRN
Status: DISCONTINUED | OUTPATIENT
Start: 2018-10-23 | End: 2018-10-29

## 2018-10-23 RX ORDER — FUROSEMIDE 10 MG/ML
40 INJECTION INTRAMUSCULAR; INTRAVENOUS ONCE
Status: COMPLETED | OUTPATIENT
Start: 2018-10-24 | End: 2018-10-24

## 2018-10-23 RX ORDER — SODIUM CHLORIDE 9 MG/ML
INJECTION, SOLUTION INTRAVENOUS
Status: COMPLETED
Start: 2018-10-23 | End: 2018-10-23

## 2018-10-23 RX ORDER — ACETAMINOPHEN 500 MG
1000 TABLET ORAL ONCE
Status: DISCONTINUED | OUTPATIENT
Start: 2018-10-23 | End: 2018-10-29

## 2018-10-23 RX ORDER — ACETAMINOPHEN 10 MG/ML
1000 INJECTION, SOLUTION INTRAVENOUS ONCE
Status: COMPLETED | OUTPATIENT
Start: 2018-10-23 | End: 2018-10-24

## 2018-10-23 RX ORDER — FUROSEMIDE 10 MG/ML
40 INJECTION INTRAMUSCULAR; INTRAVENOUS ONCE
Status: COMPLETED | OUTPATIENT
Start: 2018-10-23 | End: 2018-10-23

## 2018-10-23 RX ADMIN — ENOXAPARIN SODIUM 40 MG: 40 INJECTION SUBCUTANEOUS at 11:08

## 2018-10-23 RX ADMIN — ACETAMINOPHEN 650 MG: 650 SOLUTION ORAL at 09:51

## 2018-10-23 RX ADMIN — ACETAMINOPHEN 1000 MG: 10 INJECTION, SOLUTION INTRAVENOUS at 22:59

## 2018-10-23 RX ADMIN — THIAMINE HYDROCHLORIDE 100 MG: 100 INJECTION, SOLUTION INTRAMUSCULAR; INTRAVENOUS at 11:40

## 2018-10-23 RX ADMIN — LEVALBUTEROL 1.25 MG: 1.25 SOLUTION, CONCENTRATE RESPIRATORY (INHALATION) at 01:33

## 2018-10-23 RX ADMIN — LEVALBUTEROL 1.25 MG: 1.25 SOLUTION, CONCENTRATE RESPIRATORY (INHALATION) at 16:58

## 2018-10-23 RX ADMIN — METOPROLOL TARTRATE 5 MG: 5 INJECTION, SOLUTION INTRAVENOUS at 11:08

## 2018-10-23 RX ADMIN — ACETAMINOPHEN 650 MG: 650 SUPPOSITORY RECTAL at 17:24

## 2018-10-23 RX ADMIN — SODIUM CHLORIDE, PRESERVATIVE FREE 10 ML: 5 INJECTION INTRAVENOUS at 03:32

## 2018-10-23 RX ADMIN — Medication 10 ML: at 21:16

## 2018-10-23 RX ADMIN — DOCUSATE SODIUM 100 MG: 50 LIQUID ORAL at 11:08

## 2018-10-23 RX ADMIN — FOLIC ACID 1 MG: 1 TABLET ORAL at 11:08

## 2018-10-23 RX ADMIN — SODIUM CHLORIDE: 900 INJECTION INTRAVENOUS at 11:38

## 2018-10-23 RX ADMIN — FAMOTIDINE 20 MG: 10 INJECTION, SOLUTION INTRAVENOUS at 11:08

## 2018-10-23 RX ADMIN — MEROPENEM 1 G: 1 INJECTION, POWDER, FOR SOLUTION INTRAVENOUS at 02:24

## 2018-10-23 RX ADMIN — ACETAMINOPHEN 650 MG: 650 SUPPOSITORY RECTAL at 01:11

## 2018-10-23 RX ADMIN — FAMOTIDINE 20 MG: 10 INJECTION, SOLUTION INTRAVENOUS at 21:16

## 2018-10-23 RX ADMIN — LEVALBUTEROL 1.25 MG: 1.25 SOLUTION, CONCENTRATE RESPIRATORY (INHALATION) at 09:10

## 2018-10-23 RX ADMIN — ACETAMINOPHEN 650 MG: 650 SOLUTION ORAL at 06:10

## 2018-10-23 RX ADMIN — MEROPENEM 1 G: 1 INJECTION, POWDER, FOR SOLUTION INTRAVENOUS at 11:07

## 2018-10-23 RX ADMIN — MEROPENEM 1 G: 1 INJECTION, POWDER, FOR SOLUTION INTRAVENOUS at 18:16

## 2018-10-23 RX ADMIN — FUROSEMIDE 40 MG: 10 INJECTION, SOLUTION INTRAMUSCULAR; INTRAVENOUS at 02:12

## 2018-10-23 RX ADMIN — FAMOTIDINE 20 MG: 10 INJECTION, SOLUTION INTRAVENOUS at 01:10

## 2018-10-23 RX ADMIN — DOCUSATE SODIUM 100 MG: 50 LIQUID ORAL at 21:16

## 2018-10-23 RX ADMIN — MUPIROCIN: 20 OINTMENT TOPICAL at 21:16

## 2018-10-23 RX ADMIN — METOPROLOL TARTRATE 25 MG: 25 TABLET ORAL at 21:16

## 2018-10-23 RX ADMIN — METOPROLOL TARTRATE 5 MG: 5 INJECTION, SOLUTION INTRAVENOUS at 03:30

## 2018-10-23 RX ADMIN — Medication 10 ML: at 11:09

## 2018-10-23 ASSESSMENT — PAIN SCALES - GENERAL
PAINLEVEL_OUTOF10: 0

## 2018-10-23 NOTE — CONSULTS
Gastroenterology Consult Note    Patient:   Burgess Crow   YOB: 1965   Facility:   [facility]   Referring/PCP: Tristian Rojas MD  Date:     10/23/2018  Consultant:   Maddison Rubio MD    Subjective: This 46 y.o. female was admitted 10/14/2018 with a diagnosis of \"Respiratory arrest before cardiac arrest ([facility] [I46.9, R09.2]  Respiratory arrest before cardiac arrest ([facility] [I46.9, R09.2]\" and is seen in consultation regarding \"dysphagia\". Information was obtained from interview of  the patient's daughter, examination of the patient, and review of records. I did  update the past medical, surgical, social and / or family history. Dysphagia in throat for days severe associated w coma    Current status  Present  Diet Order: DIET TUBE FEED CONTINUOUS/CYCLIC NPO; Low Calorie High Protein (Vital ); Orogastric; Continuous; 10; 60 and she is tolerating tube feeding. Recently, she has experienced no abdominal  Pain and she has not required Intravenous narcotic analgesics. The patient has also experienced no constipation, diarrhea, hematochezia, melena, nausea and vomiting      Prior to Admission medications    Medication Sig Start Date End Date Taking? Authorizing Provider   budesonide-formoterol (SYMBICORT) 160-4.5 MCG/ACT AERO Inhale 2 puffs into the lungs 2 times daily   Yes Historical Provider, MD   fluticasone (FLONASE) 50 MCG/ACT nasal spray 1 spray by Nasal route daily. Yes Historical Provider, MD   EPINEPHrine 0.15 MG/0.15ML DIGNA Inject  as directed. Epi pen if needed   Yes Historical Provider, MD   HYDROcodone-acetaminophen (NORCO)  MG per tablet Take 1 tablet by mouth every 6 hours as needed. Yes Historical Provider, MD   loratadine (CLARITIN) 10 MG tablet Take 10 mg by mouth daily. Yes Historical Provider, MD   amphetamine-dextroamphetamine (ADDERALL XR) 10 MG XR capsule Take 10 mg by mouth every morning.    Yes Historical Provider, MD

## 2018-10-23 NOTE — CARE COORDINATION
10/23/18 Pt is getting peg tube placed tomorrow. Referral made to [facility] for [address]. Will follow.  Family not interested in hospice yet

## 2018-10-23 NOTE — PROGRESS NOTES
Stable airspace opacification in the right lower lung   zone. XR CHEST PORTABLE   Final Result   Improved aeration in the right base. XR CHEST PORTABLE   Final Result   New right basilar and possible left lower lobe atelectasis and/or pneumonia. MRI BRAIN W WO CONTRAST   Final Result   1. No evidence of intracranial mass, acute ischemia, or edema. 2.  Mucosal thickening associated with right maxillary sinus and minimal   fluid signal associated with bilateral mastoid air cells. Clinical   correlation recommended for possibility of mastoiditis. XR CHEST PORTABLE   Final Result   1. Stable cardiomegaly with mild pulmonary edema. CT HEAD WO CONTRAST   Final Result   No acute intracranial abnormality. CT CERVICAL SPINE WO CONTRAST   Final Result   Spondylosis with no definite fracture. XR CHEST PORTABLE   Final Result   Status post placement of left internal jugular central venous catheter,   without gross pneumothorax. Mild pulmonary edema. Left perihilar atelectasis. XR CHEST PORTABLE   Final Result   Cardiomegaly with mild vascular indistinctness, either due to low lung   volumes or mild edema. Subtle retrocardiac opacity remains on the left         XR ABDOMEN (KUB) (SINGLE AP VIEW)   Final Result   Chest: The endotracheal tube tip is 1-2 cm above the rojas. The tube could   be withdrawn 1 cm. Abdomen: The enteric tube is in good position in the stomach which is   distended. XR CHEST PORTABLE   Final Result   Chest: The endotracheal tube tip is 1-2 cm above the rojas. The tube could   be withdrawn 1 cm. Abdomen: The enteric tube is in good position in the stomach which is   distended. Results for Bg Carbajal (MRN [id number]) as of 10/23/2018 16:05   Ref.  Range 10/22/2018 04:55 10/22/2018 08:43 10/22/2018 11:47 10/22/2018 16:59 10/22/2018 20:25 10/23/2018 00:12 10/23/2018 04:23 10/23/2018 07:50 10/23/2018 ([facility] [address]    Respiratory failure with hypoxia and hypercapnia (HCC)    Abnormal echocardiogram    Anoxic brain damage (HCC)    Mild malnutrition ([address]    Cardiomyopathy ([facility] [address]    Pulmonary infiltrate    Atelectasis  Resolved Problems:    * No resolved hospital problems.  *          Plan:   · Oxygen supplementation to keep saturation more than 90%  · Pulmonary toilet  · If patient's clinical status does not improve and the hypoxemia and fever persist or worsen can consider bronchoscopy for diagnostic and therapeutic purposes but would not change overall clinical status  · Patient to continue on  meropenem-to be reassessed if patient has any cross-reactivity because of unspecified penicillin allergies  · Patient's a BUN has gone up with the diuril and Lasix which was given to the patient-hold diuresis if deemed appropriate   · Bronchodilators  · Neurology follow-up  · Patient tentatively going for PEG placement tomorrow afternoon   · Neurology  to discuss the options with the family to decide the further course of management  · Patient is getting NG tube feeds  ·  IV Lopressor changed to enteral Lopressor with parameters  · IV thiamine d/louie   · Patient's Xopenex was decreased to every 8 hours from every 4 hours while awake  · May consider changing Xopenex to albuterol in the a.m  · .  · PUD and DVT prophylaxis as per IM    Patient's overall long-term prognosis appears to be guarded  Palliative care evaluation and follow-up if deemed appropriate for course of care and code status  Patient is limited code with no chest compressions but ok with everything again         Case discussed with family/case management and pharmacy             Electronically signed by:  Garret Josue MD    10/23/2018    4:03 PM.

## 2018-10-24 PROBLEM — J96.01 ACUTE RESPIRATORY FAILURE WITH HYPOXEMIA (HCC): Status: ACTIVE | Noted: 2018-10-24

## 2018-10-24 PROBLEM — E87.0 HYPERNATREMIA: Status: ACTIVE | Noted: 2018-10-24

## 2018-10-24 LAB
ANION GAP SERPL CALCULATED.3IONS-SCNC: 13 MMOL/L (ref 3–16)
BASE EXCESS ARTERIAL: 11.5 MMOL/L (ref -3–3)
BASE EXCESS ARTERIAL: 7.4 MMOL/L (ref -3–3)
BUN BLDV-MCNC: 61 MG/DL (ref 7–20)
CALCIUM SERPL-MCNC: 9.3 MG/DL (ref 8.3–10.6)
CARBOXYHEMOGLOBIN ARTERIAL: 0.2 % (ref 0–1.5)
CARBOXYHEMOGLOBIN ARTERIAL: 0.3 % (ref 0–1.5)
CHLORIDE BLD-SCNC: 101 MMOL/L (ref 99–110)
CO2: 36 MMOL/L (ref 21–32)
CREAT SERPL-MCNC: 1.1 MG/DL (ref 0.6–1.1)
GFR AFRICAN AMERICAN: >60
GFR NON-AFRICAN AMERICAN: 52
GLUCOSE BLD-MCNC: 133 MG/DL (ref 70–99)
GLUCOSE BLD-MCNC: 135 MG/DL (ref 70–99)
HCO3 ARTERIAL: 33.9 MMOL/L (ref 21–29)
HCO3 ARTERIAL: 36 MMOL/L (ref 21–29)
HCT VFR BLD CALC: 47.2 % (ref 36–48)
HEMOGLOBIN, ART, EXTENDED: 18.2 G/DL (ref 12–16)
HEMOGLOBIN, ART, EXTENDED: 22.5 G/DL (ref 12–16)
HEMOGLOBIN: 15.5 G/DL (ref 12–16)
MAGNESIUM: 2.9 MG/DL (ref 1.8–2.4)
MCH RBC QN AUTO: 32.9 PG (ref 26–34)
MCHC RBC AUTO-ENTMCNC: 32.9 G/DL (ref 31–36)
MCV RBC AUTO: 100.1 FL (ref 80–100)
METHEMOGLOBIN ARTERIAL: 0.5 %
METHEMOGLOBIN ARTERIAL: 0.7 %
O2 CONTENT ARTERIAL: 23 ML/DL
O2 CONTENT ARTERIAL: 29 ML/DL
O2 SAT, ARTERIAL: 91 %
O2 SAT, ARTERIAL: 92.4 %
O2 THERAPY: ABNORMAL
O2 THERAPY: ABNORMAL
PCO2 ARTERIAL: 44.7 MMHG (ref 35–45)
PCO2 ARTERIAL: 50.7 MMHG (ref 35–45)
PDW BLD-RTO: 14 % (ref 12.4–15.4)
PERFORMED ON: ABNORMAL
PH ARTERIAL: 7.44 (ref 7.35–7.45)
PH ARTERIAL: 7.52 (ref 7.35–7.45)
PLATELET # BLD: 336 K/UL (ref 135–450)
PMV BLD AUTO: 9.5 FL (ref 5–10.5)
PO2 ARTERIAL: 56.5 MMHG (ref 75–108)
PO2 ARTERIAL: 63.7 MMHG (ref 75–108)
POTASSIUM REFLEX MAGNESIUM: 3.5 MMOL/L (ref 3.5–5.1)
RBC # BLD: 4.71 M/UL (ref 4–5.2)
SODIUM BLD-SCNC: 150 MMOL/L (ref 136–145)
TCO2 ARTERIAL: 35.5 MMOL/L
TCO2 ARTERIAL: 37.4 MMOL/L
WBC # BLD: 20.8 K/UL (ref 4–11)

## 2018-10-24 PROCEDURE — 2500000003 HC RX 250 WO HCPCS: Performed by: INTERNAL MEDICINE

## 2018-10-24 PROCEDURE — 2700000000 HC OXYGEN THERAPY PER DAY

## 2018-10-24 PROCEDURE — 87252 VIRUS INOCULATION TISSUE: CPT

## 2018-10-24 PROCEDURE — 6360000002 HC RX W HCPCS: Performed by: HOSPITALIST

## 2018-10-24 PROCEDURE — 80048 BASIC METABOLIC PNL TOTAL CA: CPT

## 2018-10-24 PROCEDURE — 2580000003 HC RX 258: Performed by: INTERNAL MEDICINE

## 2018-10-24 PROCEDURE — 99152 MOD SED SAME PHYS/QHP 5/>YRS: CPT | Performed by: INTERNAL MEDICINE

## 2018-10-24 PROCEDURE — 99291 CRITICAL CARE FIRST HOUR: CPT | Performed by: INTERNAL MEDICINE

## 2018-10-24 PROCEDURE — 85027 COMPLETE CBC AUTOMATED: CPT

## 2018-10-24 PROCEDURE — 87205 SMEAR GRAM STAIN: CPT

## 2018-10-24 PROCEDURE — 6360000002 HC RX W HCPCS: Performed by: INTERNAL MEDICINE

## 2018-10-24 PROCEDURE — 6370000000 HC RX 637 (ALT 250 FOR IP): Performed by: INTERNAL MEDICINE

## 2018-10-24 PROCEDURE — 94002 VENT MGMT INPAT INIT DAY: CPT

## 2018-10-24 PROCEDURE — 94761 N-INVAS EAR/PLS OXIMETRY MLT: CPT

## 2018-10-24 PROCEDURE — 36600 WITHDRAWAL OF ARTERIAL BLOOD: CPT

## 2018-10-24 PROCEDURE — 2500000003 HC RX 250 WO HCPCS: Performed by: HOSPITALIST

## 2018-10-24 PROCEDURE — 2000000000 HC ICU R&B

## 2018-10-24 PROCEDURE — 31624 DX BRONCHOSCOPE/LAVAGE: CPT | Performed by: INTERNAL MEDICINE

## 2018-10-24 PROCEDURE — 3609010800 HC BRONCHOSCOPY ALVEOLAR LAVAGE: Performed by: INTERNAL MEDICINE

## 2018-10-24 PROCEDURE — 0BC18ZZ EXTIRPATION OF MATTER FROM TRACHEA, VIA NATURAL OR ARTIFICIAL OPENING ENDOSCOPIC: ICD-10-PCS | Performed by: INTERNAL MEDICINE

## 2018-10-24 PROCEDURE — 94750 HC PULMONARY COMPLIANCE STUDY: CPT

## 2018-10-24 PROCEDURE — 0DH63UZ INSERTION OF FEEDING DEVICE INTO STOMACH, PERCUTANEOUS APPROACH: ICD-10-PCS | Performed by: INTERNAL MEDICINE

## 2018-10-24 PROCEDURE — 2709999900 HC NON-CHARGEABLE SUPPLY: Performed by: INTERNAL MEDICINE

## 2018-10-24 PROCEDURE — S0028 INJECTION, FAMOTIDINE, 20 MG: HCPCS | Performed by: INTERNAL MEDICINE

## 2018-10-24 PROCEDURE — 94770 HC ETCO2 MONITOR DAILY: CPT

## 2018-10-24 PROCEDURE — 3E1F88Z IRRIGATION OF RESPIRATORY TRACT USING IRRIGATING SUBSTANCE, VIA NATURAL OR ARTIFICIAL OPENING ENDOSCOPIC: ICD-10-PCS | Performed by: INTERNAL MEDICINE

## 2018-10-24 PROCEDURE — 94640 AIRWAY INHALATION TREATMENT: CPT

## 2018-10-24 PROCEDURE — 82803 BLOOD GASES ANY COMBINATION: CPT

## 2018-10-24 PROCEDURE — 6360000002 HC RX W HCPCS: Performed by: NURSE PRACTITIONER

## 2018-10-24 PROCEDURE — C1889 IMPLANT/INSERT DEVICE, NOC: HCPCS | Performed by: INTERNAL MEDICINE

## 2018-10-24 PROCEDURE — 31646 BRNCHSC W/THER ASPIR SBSQ: CPT | Performed by: INTERNAL MEDICINE

## 2018-10-24 PROCEDURE — 0B9D8ZX DRAINAGE OF RIGHT MIDDLE LUNG LOBE, VIA NATURAL OR ARTIFICIAL OPENING ENDOSCOPIC, DIAGNOSTIC: ICD-10-PCS | Performed by: INTERNAL MEDICINE

## 2018-10-24 PROCEDURE — 87254 VIRUS INOCULATION SHELL VIA: CPT

## 2018-10-24 PROCEDURE — 87102 FUNGUS ISOLATION CULTURE: CPT

## 2018-10-24 PROCEDURE — 2580000003 HC RX 258

## 2018-10-24 PROCEDURE — 6360000002 HC RX W HCPCS

## 2018-10-24 PROCEDURE — 87070 CULTURE OTHR SPECIMN AEROBIC: CPT

## 2018-10-24 PROCEDURE — 3609013300 HC EGD TUBE PLACEMENT: Performed by: INTERNAL MEDICINE

## 2018-10-24 PROCEDURE — 83735 ASSAY OF MAGNESIUM: CPT

## 2018-10-24 RX ORDER — PROPOFOL 10 MG/ML
10 INJECTION, EMULSION INTRAVENOUS
Status: DISCONTINUED | OUTPATIENT
Start: 2018-10-24 | End: 2018-10-27

## 2018-10-24 RX ORDER — CARBOXYMETHYLCELLULOSE SODIUM 10 MG/ML
1 GEL OPHTHALMIC 3 TIMES DAILY
Status: DISCONTINUED | OUTPATIENT
Start: 2018-10-24 | End: 2018-10-29

## 2018-10-24 RX ORDER — PROPOFOL 10 MG/ML
INJECTION, EMULSION INTRAVENOUS
Status: COMPLETED
Start: 2018-10-24 | End: 2018-10-24

## 2018-10-24 RX ORDER — ACETYLCYSTEINE 200 MG/ML
SOLUTION ORAL; RESPIRATORY (INHALATION) PRN
Status: DISCONTINUED | OUTPATIENT
Start: 2018-10-24 | End: 2018-10-24 | Stop reason: HOSPADM

## 2018-10-24 RX ORDER — SODIUM CHLORIDE 9 MG/ML
INJECTION, SOLUTION INTRAVENOUS
Status: COMPLETED
Start: 2018-10-24 | End: 2018-10-24

## 2018-10-24 RX ORDER — ROCURONIUM BROMIDE 10 MG/ML
20 INJECTION, SOLUTION INTRAVENOUS ONCE
Status: COMPLETED | OUTPATIENT
Start: 2018-10-24 | End: 2018-10-24

## 2018-10-24 RX ORDER — CHLORHEXIDINE GLUCONATE 0.12 MG/ML
15 RINSE ORAL 2 TIMES DAILY
Status: DISCONTINUED | OUTPATIENT
Start: 2018-10-24 | End: 2018-10-30 | Stop reason: HOSPADM

## 2018-10-24 RX ORDER — IPRATROPIUM BROMIDE AND ALBUTEROL SULFATE 2.5; .5 MG/3ML; MG/3ML
1 SOLUTION RESPIRATORY (INHALATION) EVERY 4 HOURS
Status: DISCONTINUED | OUTPATIENT
Start: 2018-10-24 | End: 2018-10-29

## 2018-10-24 RX ORDER — LEVALBUTEROL 1.25 MG/.5ML
1.25 SOLUTION, CONCENTRATE RESPIRATORY (INHALATION) EVERY 4 HOURS
Status: DISCONTINUED | OUTPATIENT
Start: 2018-10-24 | End: 2018-10-24

## 2018-10-24 RX ORDER — 0.9 % SODIUM CHLORIDE 0.9 %
INTRAVENOUS SOLUTION INTRAVENOUS CONTINUOUS PRN
Status: COMPLETED | OUTPATIENT
Start: 2018-10-24 | End: 2018-10-24

## 2018-10-24 RX ORDER — ETOMIDATE 2 MG/ML
10 INJECTION INTRAVENOUS ONCE
Status: COMPLETED | OUTPATIENT
Start: 2018-10-24 | End: 2018-10-24

## 2018-10-24 RX ORDER — FENTANYL CITRATE 50 UG/ML
25 INJECTION, SOLUTION INTRAMUSCULAR; INTRAVENOUS ONCE
Status: COMPLETED | OUTPATIENT
Start: 2018-10-24 | End: 2018-10-24

## 2018-10-24 RX ORDER — LIDOCAINE HYDROCHLORIDE 20 MG/ML
INJECTION, SOLUTION EPIDURAL; INFILTRATION; INTRACAUDAL; PERINEURAL PRN
Status: DISCONTINUED | OUTPATIENT
Start: 2018-10-24 | End: 2018-10-24 | Stop reason: HOSPADM

## 2018-10-24 RX ADMIN — CARBOXYMETHYLCELLULOSE SODIUM 1 DROP: 10 GEL OPHTHALMIC at 15:50

## 2018-10-24 RX ADMIN — ETOMIDATE 10 MG: 2 INJECTION INTRAVENOUS at 03:08

## 2018-10-24 RX ADMIN — MEROPENEM 1 G: 1 INJECTION, POWDER, FOR SOLUTION INTRAVENOUS at 09:42

## 2018-10-24 RX ADMIN — LEVALBUTEROL 1.25 MG: 1.25 SOLUTION, CONCENTRATE RESPIRATORY (INHALATION) at 00:00

## 2018-10-24 RX ADMIN — Medication 10 ML: at 09:18

## 2018-10-24 RX ADMIN — LEVALBUTEROL 1.25 MG: 1.25 SOLUTION, CONCENTRATE RESPIRATORY (INHALATION) at 15:56

## 2018-10-24 RX ADMIN — Medication 15 ML: at 10:09

## 2018-10-24 RX ADMIN — PROPOFOL 20 MCG/KG/MIN: 10 INJECTION, EMULSION INTRAVENOUS at 04:46

## 2018-10-24 RX ADMIN — PROPOFOL 10 MCG/KG/MIN: 10 INJECTION, EMULSION INTRAVENOUS at 03:24

## 2018-10-24 RX ADMIN — CARBOXYMETHYLCELLULOSE SODIUM 1 DROP: 10 GEL OPHTHALMIC at 21:59

## 2018-10-24 RX ADMIN — LEVALBUTEROL 1.25 MG: 1.25 SOLUTION, CONCENTRATE RESPIRATORY (INHALATION) at 08:11

## 2018-10-24 RX ADMIN — MUPIROCIN: 20 OINTMENT TOPICAL at 22:01

## 2018-10-24 RX ADMIN — MEROPENEM 1 G: 1 INJECTION, POWDER, FOR SOLUTION INTRAVENOUS at 18:18

## 2018-10-24 RX ADMIN — FAMOTIDINE 20 MG: 10 INJECTION, SOLUTION INTRAVENOUS at 21:59

## 2018-10-24 RX ADMIN — FUROSEMIDE 40 MG: 10 INJECTION, SOLUTION INTRAMUSCULAR; INTRAVENOUS at 00:12

## 2018-10-24 RX ADMIN — SODIUM CHLORIDE 250 ML: 9 INJECTION, SOLUTION INTRAVENOUS at 18:19

## 2018-10-24 RX ADMIN — PROPOFOL 35 MCG/KG/MIN: 10 INJECTION, EMULSION INTRAVENOUS at 23:58

## 2018-10-24 RX ADMIN — FAMOTIDINE 20 MG: 10 INJECTION, SOLUTION INTRAVENOUS at 09:16

## 2018-10-24 RX ADMIN — Medication 15 ML: at 22:01

## 2018-10-24 RX ADMIN — PROPOFOL 25 MCG/KG/MIN: 10 INJECTION, EMULSION INTRAVENOUS at 18:27

## 2018-10-24 RX ADMIN — MEROPENEM 1 G: 1 INJECTION, POWDER, FOR SOLUTION INTRAVENOUS at 02:10

## 2018-10-24 RX ADMIN — LEVALBUTEROL 1.25 MG: 1.25 SOLUTION, CONCENTRATE RESPIRATORY (INHALATION) at 12:15

## 2018-10-24 RX ADMIN — DOCUSATE SODIUM 100 MG: 50 LIQUID ORAL at 09:41

## 2018-10-24 RX ADMIN — PROPOFOL 25 MCG/KG/MIN: 10 INJECTION, EMULSION INTRAVENOUS at 10:58

## 2018-10-24 RX ADMIN — Medication 10 ML: at 21:57

## 2018-10-24 RX ADMIN — IPRATROPIUM BROMIDE AND ALBUTEROL SULFATE 1 AMPULE: .5; 3 SOLUTION RESPIRATORY (INHALATION) at 20:30

## 2018-10-24 RX ADMIN — MUPIROCIN: 20 OINTMENT TOPICAL at 09:42

## 2018-10-24 RX ADMIN — FENTANYL CITRATE 25 MCG: 50 INJECTION, SOLUTION INTRAMUSCULAR; INTRAVENOUS at 02:09

## 2018-10-24 RX ADMIN — CARBOXYMETHYLCELLULOSE SODIUM 1 DROP: 10 GEL OPHTHALMIC at 09:17

## 2018-10-24 RX ADMIN — ROCURONIUM BROMIDE 20 MG: 10 SOLUTION INTRAVENOUS at 03:09

## 2018-10-24 RX ADMIN — FOLIC ACID 1 MG: 1 TABLET ORAL at 09:18

## 2018-10-24 ASSESSMENT — PAIN SCALES - GENERAL
PAINLEVEL_OUTOF10: 0

## 2018-10-24 ASSESSMENT — PULMONARY FUNCTION TESTS
PIF_VALUE: 30
PIF_VALUE: 25
PIF_VALUE: 16
PIF_VALUE: 17
PIF_VALUE: 19
PIF_VALUE: 19
PIF_VALUE: 21
PIF_VALUE: 18
PIF_VALUE: 17
PIF_VALUE: 23
PIF_VALUE: 16
PIF_VALUE: 17
PIF_VALUE: 18
PIF_VALUE: 26
PIF_VALUE: 16
PIF_VALUE: 16
PIF_VALUE: 30
PIF_VALUE: 17
PIF_VALUE: 17

## 2018-10-24 NOTE — PROGRESS NOTES
abnormality. CT CERVICAL SPINE WO CONTRAST   Final Result   Spondylosis with no definite fracture. XR CHEST PORTABLE   Final Result   Status post placement of left internal jugular central venous catheter,   without gross pneumothorax. Mild pulmonary edema. Left perihilar atelectasis. XR CHEST PORTABLE   Final Result   Cardiomegaly with mild vascular indistinctness, either due to low lung   volumes or mild edema. Subtle retrocardiac opacity remains on the left         XR ABDOMEN (KUB) (SINGLE AP VIEW)   Final Result   Chest: The endotracheal tube tip is 1-2 cm above the rojas. The tube could   be withdrawn 1 cm. Abdomen: The enteric tube is in good position in the stomach which is   distended. XR CHEST PORTABLE   Final Result   Chest: The endotracheal tube tip is 1-2 cm above the rojas. The tube could   be withdrawn 1 cm. Abdomen: The enteric tube is in good position in the stomach which is   distended. Assessment/Plan:    Active Hospital Problems    Diagnosis Date Noted    Cardiomyopathy [facility]) [I42.9] 10/22/2018    Pulmonary infiltrate [R91.8] 10/22/2018    Atelectasis [J98.11] 10/22/2018    Anoxic brain damage (HCC) [G93.1]     Abnormal echocardiogram [R93.1]     V-tach ([address] [I47.2] 10/14/2018    Sepsis ([address] [A41.9] 10/14/2018    Respiratory arrest before cardiac arrest ([address] [I46.9, R09.2] 10/14/2018    Cardiac arrest with ventricular fibrillation (HCC) [I46.9, I49.01] 10/14/2018    Respiratory failure with hypoxia and hypercapnia ([address] [J96.91, J96.92]     Asthma [J45.909]        PLAN:    Acute hypoxemic and hypercapnic respiratory failure   Unclear etiology, most likely caused by cardiac arrest.    Intubated, on mechanical ventilation  Current antibiotic is meropenem for possible pneumonia       Cardiac arrest:  Ventricular fibrillation noted at the onset.  Acute coronary syndrome causing VF is likely to be the reason, but impossible to be certain  Multiple cardiac risk factors and reduced LVEF with regional WM abnormalities, all noted. Will require ischemia evaluation with [facility] once patient is medically stable and more alert  Cardiology following     Acute encephalopathy:   The obvious explanation is anoxic brain injury. Again, we cannot be certain. However, MRI brain on 10/15 does not show any CVA. There is a possibility of mild generalized ischemia without a focus of involvement. Neuro consulted. The impression is that the patient would require 2-3 months to see level of recovery  EEG was negative for epileptiform activity  Family not interested in end of life discussions. Dysphagia:  Unlikely to swallow even after extubation. Requires a long recovery time. PEG tube inserted earlier today by GI. TF when Dr. Lidia Nunez clears, probably tomorrow     HTN:  Holding ACEI, on metoprolol. Appears stable and controlled.       D/w family at bedside      D/w nursing    DVT Prophylaxis: Lovenox  Diet: Diet NPO Effective Now  Code Status: Limited    PT/OT Eval Status: not possible    Dispo - inpatient    Jes Gomez MD

## 2018-10-24 NOTE — PROGRESS NOTES
XR ABDOMEN (KUB) (SINGLE AP VIEW)   Final Result   Chest: The endotracheal tube tip is 1-2 cm above the rojas. The tube could   be withdrawn 1 cm. Abdomen: The enteric tube is in good position in the stomach which is   distended. XR CHEST PORTABLE   Final Result   Chest: The endotracheal tube tip is 1-2 cm above the rojas. The tube could   be withdrawn 1 cm. Abdomen: The enteric tube is in good position in the stomach which is   distended. Results for Lilian Lechuga (MRN [id number]) as of 10/24/2018 19:50   Ref. Range 10/23/2018 08:09 10/23/2018 11:45 10/23/2018 16:21 10/23/2018 20:24 10/24/2018 00:03 10/24/2018 05:28   Sodium Latest Ref Range: 136 - 145 mmol/L 150 (H)     150 (H)   Potassium Latest Ref Range: 3.5 - 5.1 mmol/L 3.4 (L)     3.5   Chloride Latest Ref Range: 99 - 110 mmol/L 103     101   CO2 Latest Ref Range: 21 - 32 mmol/L 33 (H)     36 (H)   BUN Latest Ref Range: 7 - 20 mg/dL 57 (H)     61 (H)   Creatinine Latest Ref Range: 0.6 - 1.1 mg/dL 1.0     1.1   Anion Gap Latest Ref Range: 3 - 16  14     13   GFR Non- Latest Ref Range: >60  58 (A)     52 (A)   GFR  Latest Ref Range: >60  >60     >60   Magnesium Latest Ref Range: 1.80 - 2.40 mg/dL 2.70 (H)     2.90 (H)   Glucose Latest Ref Range: 70 - 99 mg/dL 169 (H)     133 (H)   POC Glucose Latest Ref Range: 70 - 99 mg/dl  141 (H) 132 (H) 127 (H) 135 (H)    Calcium Latest Ref Range: 8.3 - 10.6 mg/dL 10.7 (H)     9.3     Results for Lilian Lechuga (MRN [id number]) as of 10/24/2018 19:50   Ref.  Range 10/21/2018 06:07 10/22/2018 04:55 10/23/2018 08:09 10/24/2018 05:28   WBC Latest Ref Range: 4.0 - 11.0 K/uL 18.5 (H) 16.0 (H) 18.3 (H) 20.8 (H)   RBC Latest Ref Range: 4.00 - 5.20 M/uL 4.59 4.85 4.85 4.71   Hemoglobin Quant Latest Ref Range: 12.0 - 16.0 g/dL 15.2 15.8 16.0 15.5   Hematocrit Latest Ref Range: 36.0 - 48.0 % 45.3 47.5 48.0 47.2   MCV Latest Ref Range: 80.0 - 100.0 fL 98.6 97.9 98.9

## 2018-10-24 NOTE — PROGRESS NOTES
Assessment complete. VSS. Resting quietly in bed. No s/s distress. Tolerating ventilator well. RASS -1- +1. O2 WNL on current vent settings. Pt responds to painful stimuli. Tele SR. Propofol gtt @ 20. Q2T performed. Jaquez secure and draining.  at bedside. Will monitor.

## 2018-10-24 NOTE — PROGRESS NOTES
Bedside PEG placement with Dr. Tanika Correa complete. Pt to remain NPO. No use of PEG tube until 10/25 per Dr. Tanika Correa.

## 2018-10-24 NOTE — PROGRESS NOTES
RESPIRATORY THERAPY ASSESSMENT    Name:  Mariel Lester  Medical Record Number:  [id number]  Age: 46 y.o. Gender: female  : 1965  Today's Date:  10/24/2018  Room:  [facility]7/0227-01    Assessment     Is the patient being admitted for a COPD or Asthma exacerbation? No   (If yes the patient will be seen every 4 hours for the first 24 hours and then reassessed)    Patient Admission Diagnosis      Allergies  Allergies   Allergen Reactions    Aspirin Hives    Nsaids      nausea    Penicillins        Minimum Predicted Vital Capacity:     NA          Actual Vital Capacity:      NA on vent          Pulmonary History:Asthma  Home Oxygen Therapy:  room air  Home Respiratory Therapy:Albuterol and Mometasone/Formoterol   Current Respiratory Therapy:  Xopenex Q8  Treatment Type: HHN  Medications: Levalbuterol HCL    Respiratory Severity Index(RSI)   Patients with orders for inhalation medications, oxygen, or any therapeutic treatment modality will be placed on Respiratory Protocol. They will be assessed with the first treatment and at least every 72 hours thereafter. The following severity scale will be used to determine frequency of treatment intervention.     Smoking History: Mild Exacerbation = 3    Social History  Social History   Substance Use Topics    Smoking status: Current Every Day Smoker     Packs/day: 0.04     Years: 16.00     Types: Cigarettes    Smokeless tobacco: Never Used    Alcohol use No       Recent Surgical History: None = 0  Past Surgical History  Past Surgical History:   Procedure Laterality Date    APPENDECTOMY      BACK SURGERY      EYE SURGERY  2010    Incision and drainage right lower eyelid abscess, nasolacrimal duct probing on the right    HYSTERECTOMY      MOUTH SURGERY      SHOULDER SURGERY      TONSILLECTOMY         Level of Consciousness: Lethargic = 3    Level of Activity: Bedridden, unresponsive or quadriplegic = 4    Respiratory Pattern: Regular Pattern; RR 8-20 = 0    Breath Sounds: Diminshed bilaterally and/or crackles = 2    Sputum  Sputum Color: None, Tenacity: None, Sputum How Obtained: None  Cough: Strong, spontaneous, non-productive = 0    Vital Signs   /69   Pulse 128   Temp 102.1 °F (38.9 °C) (Axillary)   Resp 17   Ht 5' 8\" (1.727 m)   Wt 203 lb 7.8 oz (92.3 kg)   SpO2 91%   BMI 30.94 kg/m²   SPO2 (COPD values may differ): Less than 86% on room air or greater than 92% on FiO2 greater than 50% = 4    Peak Flow (asthma only): not applicable = 0    RSI: 30-37 = Q4 (every four hours)        Plan       Goals: medication delivery, mobilize retained secretions, volume expansion and improve oxygenation    Patient/caregiver was educated on the proper method of use for Respiratory Care Devices:  Yes      Level of patient/caregiver understanding able to:   [] Verbalize understanding   [] Demonstrate understanding       [] Teach back        [] Needs reinforcement       []  No available caregiver               []  Other:     Response to education:  Poor     Is patient being placed on Home Treatment Regimen? No     Does the patient have everything they need prior to discharge? NA     Comments: pt assessed and chart reviewed    Plan of Care: Xopenex Q4    Electronically signed by Lord Yudith RCP on 10/24/2018 at 3:23 AM    Respiratory Protocol Guidelines     1. Assessment and treatment by Respiratory Therapy will be initiated for medication and therapeutic interventions upon initiation of aerosolized medication. 2. Physician will be contacted for respiratory rate (RR) greater than 35 breaths per minute. Therapy will be held for heart rate (HR) greater than 140 beats per minute, pending direction from physician. 3. Bronchodilators will be administered via Metered Dose Inhaler (MDI) with spacer when the following criteria are met:  a.  Alert and cooperative     b. HR < 140 bpm  c. RR < 30 bpm                d. Can demonstrate a 2-3 second inspiratory

## 2018-10-24 NOTE — PROGRESS NOTES
Pt noted with increasing core and rectal temps 100.1-100.3 . Cooling blanket in place and actively cooling. Will monitor for need for PRN Tylenol.

## 2018-10-24 NOTE — PROGRESS NOTES
Endo team at bedside at this time for PEG placement. Consent signed and in chart.  present and aware.

## 2018-10-24 NOTE — FLOWSHEET NOTE
10/24/18 1653   Encounter Summary   Services provided to: Patient and family together  (Pt on vent)   Referral/Consult From: Other ;Palliative Care   Support System Spouse; Children;Family members;Parent   Place of [facility] w/Shinto upbringing   Continue Visiting Yes  (10/24 Spt/prayer w/vent pt family, holding out for miracle.)   Complexity of Encounter High   Length of Encounter 30 minutes   Grief and Life Adjustment   Type Adjustment to illness  (Poor px)   Assessment Calm; Approachable; Hopeful;Coping;Resentful;Doubtful   Intervention Active listening;Explored feelings, thoughts, concerns;Nurtured hope;Prayer;Sustaining presence/ Ministry of presence; Discussed belief system/Caodaism practices/rei;Discussed illness/injury and it's impact   Outcome Connection/belonging;Comfort;Expressed gratitude;Engaged in conversation; Shared life review;Expressed feelings/needs/concerns;Coping;Tearful;Less anxious, less agitated;Encouraged

## 2018-10-24 NOTE — CARE COORDINATION
CM approached by Dr Bj Mejia. Patient returned to ICU level of care and is again on a vent. Per MD he feels patient will likely need a trach and ultimately LTACH. Writer aware of PEG placement today. Spoke to Jey with [facility] and she is reviewing patient chart to see if will qualify for LTACH. If appropriate will discuss with family for facility location choice.   Alexandrea Allred RN

## 2018-10-25 ENCOUNTER — APPOINTMENT (OUTPATIENT)
Dept: CT IMAGING | Age: 53
DRG: 005 | End: 2018-10-25
Payer: COMMERCIAL

## 2018-10-25 ENCOUNTER — ANESTHESIA EVENT (OUTPATIENT)
Dept: OPERATING ROOM | Age: 53
DRG: 005 | End: 2018-10-25
Payer: COMMERCIAL

## 2018-10-25 LAB
ANION GAP SERPL CALCULATED.3IONS-SCNC: 12 MMOL/L (ref 3–16)
BUN BLDV-MCNC: 60 MG/DL (ref 7–20)
CALCIUM SERPL-MCNC: 9.4 MG/DL (ref 8.3–10.6)
CHLORIDE BLD-SCNC: 107 MMOL/L (ref 99–110)
CO2: 35 MMOL/L (ref 21–32)
CREAT SERPL-MCNC: 0.8 MG/DL (ref 0.6–1.1)
GFR AFRICAN AMERICAN: >60
GFR NON-AFRICAN AMERICAN: >60
GLUCOSE BLD-MCNC: 137 MG/DL (ref 70–99)
HCT VFR BLD CALC: 45.2 % (ref 36–48)
HEMOGLOBIN: 14.5 G/DL (ref 12–16)
MAGNESIUM: 2.9 MG/DL (ref 1.8–2.4)
MCH RBC QN AUTO: 32.2 PG (ref 26–34)
MCHC RBC AUTO-ENTMCNC: 32.1 G/DL (ref 31–36)
MCV RBC AUTO: 100.3 FL (ref 80–100)
PDW BLD-RTO: 13.8 % (ref 12.4–15.4)
PLATELET # BLD: 324 K/UL (ref 135–450)
PMV BLD AUTO: 9.5 FL (ref 5–10.5)
POTASSIUM REFLEX MAGNESIUM: 3.4 MMOL/L (ref 3.5–5.1)
RBC # BLD: 4.5 M/UL (ref 4–5.2)
SODIUM BLD-SCNC: 154 MMOL/L (ref 136–145)
VITAMIN B1, PLASMA: 472 NMOL/L (ref 4–15)
WBC # BLD: 22.2 K/UL (ref 4–11)

## 2018-10-25 PROCEDURE — 6370000000 HC RX 637 (ALT 250 FOR IP): Performed by: NURSE PRACTITIONER

## 2018-10-25 PROCEDURE — 99024 POSTOP FOLLOW-UP VISIT: CPT | Performed by: THORACIC SURGERY (CARDIOTHORACIC VASCULAR SURGERY)

## 2018-10-25 PROCEDURE — 6370000000 HC RX 637 (ALT 250 FOR IP): Performed by: INTERNAL MEDICINE

## 2018-10-25 PROCEDURE — 80048 BASIC METABOLIC PNL TOTAL CA: CPT

## 2018-10-25 PROCEDURE — 94761 N-INVAS EAR/PLS OXIMETRY MLT: CPT

## 2018-10-25 PROCEDURE — 6360000004 HC RX CONTRAST MEDICATION: Performed by: INTERNAL MEDICINE

## 2018-10-25 PROCEDURE — 2700000000 HC OXYGEN THERAPY PER DAY

## 2018-10-25 PROCEDURE — S0028 INJECTION, FAMOTIDINE, 20 MG: HCPCS | Performed by: INTERNAL MEDICINE

## 2018-10-25 PROCEDURE — 94640 AIRWAY INHALATION TREATMENT: CPT

## 2018-10-25 PROCEDURE — 94750 HC PULMONARY COMPLIANCE STUDY: CPT

## 2018-10-25 PROCEDURE — 94003 VENT MGMT INPAT SUBQ DAY: CPT

## 2018-10-25 PROCEDURE — 6360000002 HC RX W HCPCS: Performed by: HOSPITALIST

## 2018-10-25 PROCEDURE — 2500000003 HC RX 250 WO HCPCS: Performed by: INTERNAL MEDICINE

## 2018-10-25 PROCEDURE — 71260 CT THORAX DX C+: CPT

## 2018-10-25 PROCEDURE — 2580000003 HC RX 258: Performed by: INTERNAL MEDICINE

## 2018-10-25 PROCEDURE — 85027 COMPLETE CBC AUTOMATED: CPT

## 2018-10-25 PROCEDURE — 6360000002 HC RX W HCPCS: Performed by: INTERNAL MEDICINE

## 2018-10-25 PROCEDURE — 94770 HC ETCO2 MONITOR DAILY: CPT

## 2018-10-25 PROCEDURE — 99291 CRITICAL CARE FIRST HOUR: CPT | Performed by: INTERNAL MEDICINE

## 2018-10-25 PROCEDURE — 36592 COLLECT BLOOD FROM PICC: CPT

## 2018-10-25 PROCEDURE — 83735 ASSAY OF MAGNESIUM: CPT

## 2018-10-25 PROCEDURE — 2000000000 HC ICU R&B

## 2018-10-25 RX ORDER — METOPROLOL TARTRATE 5 MG/5ML
INJECTION INTRAVENOUS
Status: DISPENSED
Start: 2018-10-25 | End: 2018-10-25

## 2018-10-25 RX ORDER — POTASSIUM CHLORIDE 29.8 MG/ML
20 INJECTION INTRAVENOUS PRN
Status: DISCONTINUED | OUTPATIENT
Start: 2018-10-25 | End: 2018-10-30 | Stop reason: HOSPADM

## 2018-10-25 RX ORDER — METOPROLOL TARTRATE 5 MG/5ML
5 INJECTION INTRAVENOUS EVERY 6 HOURS PRN
Status: DISCONTINUED | OUTPATIENT
Start: 2018-10-25 | End: 2018-10-30 | Stop reason: HOSPADM

## 2018-10-25 RX ADMIN — MUPIROCIN: 20 OINTMENT TOPICAL at 10:42

## 2018-10-25 RX ADMIN — IPRATROPIUM BROMIDE AND ALBUTEROL SULFATE 1 AMPULE: .5; 3 SOLUTION RESPIRATORY (INHALATION) at 16:59

## 2018-10-25 RX ADMIN — IPRATROPIUM BROMIDE AND ALBUTEROL SULFATE 1 AMPULE: .5; 3 SOLUTION RESPIRATORY (INHALATION) at 04:30

## 2018-10-25 RX ADMIN — CARBOXYMETHYLCELLULOSE SODIUM 1 DROP: 10 GEL OPHTHALMIC at 10:39

## 2018-10-25 RX ADMIN — IPRATROPIUM BROMIDE AND ALBUTEROL SULFATE 1 AMPULE: .5; 3 SOLUTION RESPIRATORY (INHALATION) at 08:47

## 2018-10-25 RX ADMIN — POTASSIUM CHLORIDE 20 MEQ: 400 INJECTION, SOLUTION INTRAVENOUS at 06:38

## 2018-10-25 RX ADMIN — FAMOTIDINE 20 MG: 10 INJECTION, SOLUTION INTRAVENOUS at 20:56

## 2018-10-25 RX ADMIN — MEROPENEM 1 G: 1 INJECTION, POWDER, FOR SOLUTION INTRAVENOUS at 10:36

## 2018-10-25 RX ADMIN — IPRATROPIUM BROMIDE AND ALBUTEROL SULFATE 1 AMPULE: .5; 3 SOLUTION RESPIRATORY (INHALATION) at 12:22

## 2018-10-25 RX ADMIN — Medication 10 ML: at 20:58

## 2018-10-25 RX ADMIN — CARBOXYMETHYLCELLULOSE SODIUM 1 DROP: 10 GEL OPHTHALMIC at 15:06

## 2018-10-25 RX ADMIN — MUPIROCIN: 20 OINTMENT TOPICAL at 20:57

## 2018-10-25 RX ADMIN — IPRATROPIUM BROMIDE AND ALBUTEROL SULFATE 1 AMPULE: .5; 3 SOLUTION RESPIRATORY (INHALATION) at 00:30

## 2018-10-25 RX ADMIN — PROPOFOL 35 MCG/KG/MIN: 10 INJECTION, EMULSION INTRAVENOUS at 15:28

## 2018-10-25 RX ADMIN — MEROPENEM 1 G: 1 INJECTION, POWDER, FOR SOLUTION INTRAVENOUS at 02:40

## 2018-10-25 RX ADMIN — METOPROLOL TARTRATE 25 MG: 25 TABLET ORAL at 20:57

## 2018-10-25 RX ADMIN — FAMOTIDINE 20 MG: 10 INJECTION, SOLUTION INTRAVENOUS at 10:41

## 2018-10-25 RX ADMIN — Medication 15 ML: at 20:56

## 2018-10-25 RX ADMIN — CARBOXYMETHYLCELLULOSE SODIUM 1 DROP: 10 GEL OPHTHALMIC at 20:57

## 2018-10-25 RX ADMIN — ACETAMINOPHEN 650 MG: 650 SOLUTION ORAL at 22:14

## 2018-10-25 RX ADMIN — METOPROLOL TARTRATE 25 MG: 25 TABLET ORAL at 10:39

## 2018-10-25 RX ADMIN — METOPROLOL TARTRATE 5 MG: 5 INJECTION, SOLUTION INTRAVENOUS at 06:25

## 2018-10-25 RX ADMIN — ENOXAPARIN SODIUM 40 MG: 40 INJECTION SUBCUTANEOUS at 10:35

## 2018-10-25 RX ADMIN — IOPAMIDOL 75 ML: 755 INJECTION, SOLUTION INTRAVENOUS at 21:35

## 2018-10-25 RX ADMIN — IPRATROPIUM BROMIDE AND ALBUTEROL SULFATE 1 AMPULE: .5; 3 SOLUTION RESPIRATORY (INHALATION) at 19:43

## 2018-10-25 RX ADMIN — POTASSIUM CHLORIDE 40 MEQ: 20 SOLUTION ORAL at 15:07

## 2018-10-25 RX ADMIN — MEROPENEM 1 G: 1 INJECTION, POWDER, FOR SOLUTION INTRAVENOUS at 18:53

## 2018-10-25 RX ADMIN — Medication 10 ML: at 10:41

## 2018-10-25 RX ADMIN — PROPOFOL 35 MCG/KG/MIN: 10 INJECTION, EMULSION INTRAVENOUS at 21:08

## 2018-10-25 RX ADMIN — PROPOFOL 35 MCG/KG/MIN: 10 INJECTION, EMULSION INTRAVENOUS at 10:36

## 2018-10-25 RX ADMIN — FOLIC ACID 1 MG: 1 TABLET ORAL at 10:39

## 2018-10-25 RX ADMIN — Medication 15 ML: at 10:40

## 2018-10-25 RX ADMIN — DOCUSATE SODIUM 100 MG: 50 LIQUID ORAL at 20:56

## 2018-10-25 ASSESSMENT — PULMONARY FUNCTION TESTS
PIF_VALUE: 18
PIF_VALUE: 22
PIF_VALUE: 15
PIF_VALUE: 17
PIF_VALUE: 15
PIF_VALUE: 18
PIF_VALUE: 15
PIF_VALUE: 22
PIF_VALUE: 20
PIF_VALUE: 19
PIF_VALUE: 15
PIF_VALUE: 20
PIF_VALUE: 18
PIF_VALUE: 16
PIF_VALUE: 16
PIF_VALUE: 18
PIF_VALUE: 19
PIF_VALUE: 18
PIF_VALUE: 17
PIF_VALUE: 17
PIF_VALUE: 15
PIF_VALUE: 17
PIF_VALUE: 18
PIF_VALUE: 19
PIF_VALUE: 15
PIF_VALUE: 17
PIF_VALUE: 18

## 2018-10-25 ASSESSMENT — PAIN SCALES - GENERAL: PAINLEVEL_OUTOF10: 5

## 2018-10-25 NOTE — CONSULTS
4.50 10/25/2018    HGB 14.5 10/25/2018    HCT 45.2 10/25/2018     10/25/2018    .3 10/25/2018    MCH 32.2 10/25/2018    MCHC 32.1 10/25/2018    RDW 13.8 10/25/2018    SEGSPCT 60.0 02/14/2011    BANDSPCT 1 10/15/2018    METASPCT 1 10/14/2018    LYMPHOPCT 11.0 10/15/2018    MONOPCT 5.0 10/15/2018    EOSPCT 0.9 02/14/2011    BASOPCT 0.0 10/15/2018    MONOSABS 0.9 10/15/2018    LYMPHSABS 2.1 10/15/2018    EOSABS 0.0 10/15/2018    BASOSABS 0.0 10/15/2018    DIFFTYPE Auto 02/14/2011     CMP:    Lab Results   Component Value Date     10/25/2018    K 3.4 10/25/2018     10/25/2018    CO2 35 10/25/2018    BUN 60 10/25/2018    CREATININE 0.8 10/25/2018    GFRAA >60 10/25/2018    GFRAA >60 02/14/2011    AGRATIO 1.4 10/15/2018    LABGLOM >60 10/25/2018    GLUCOSE 137 10/25/2018    PROT 5.9 10/15/2018    PROT 7.3 02/14/2011    LABALBU 3.4 10/15/2018    CALCIUM 9.4 10/25/2018    BILITOT <0.2 10/15/2018    ALKPHOS 59 10/15/2018    AST 26 10/15/2018    ALT 32 10/15/2018     Hepatic Function Panel:    Lab Results   Component Value Date    ALKPHOS 59 10/15/2018    ALT 32 10/15/2018    AST 26 10/15/2018    PROT 5.9 10/15/2018    PROT 7.3 02/14/2011    BILITOT <0.2 10/15/2018    LABALBU 3.4 10/15/2018     U/A:    Lab Results   Component Value Date    NITRITE neg 02/14/2011    COLORU Yellow 10/22/2018    PROTEINU 100 10/22/2018    PHUR 5.5 10/22/2018    LABCAST 5-10 Hyaline 10/22/2018    WBCUA 3-5 10/22/2018    RBCUA 3-5 10/22/2018    YEAST Present 10/22/2018    BACTERIA 2+ 10/22/2018    CLARITYU Clear 10/22/2018    SPECGRAV >=1.030 10/22/2018    LEUKOCYTESUR Negative 10/22/2018    UROBILINOGEN 0.2 10/22/2018    BILIRUBINUR Negative 10/22/2018    BILIRUBINUR NEGATIVE 02/14/2011    BLOODU Negative 10/22/2018    GLUCOSEU Negative 10/22/2018    GLUCOSEU NEGATIVE 02/14/2011    AMORPHOUS Rare 10/14/2018     CXR:  10/23/18 small bilateral pleural effusions    ECHO 10/15/18 Summary   -- The left ventricular systolic function is moderately reduced with an   ejection fraction of 40 %.   There is hypokinesis of the inferior, anteroseptal and septal walls. Left   ventricular cavity size is severely dilated. Normal left ventricular   diastolic filling pressure. Normal left ventricular wall thickness.   -- The right ventricle is moderately enlarged. Right ventricular systolic   function is normal.      ASSESSMENT AND PLAN:  45 yo female has suffered anoxic brain injury following cardiac arrest at home. Found by . Transported to [facility]. Now on the ventilator since 10/14 with brief episode of extubation. In need of tracheostomy for long term care. PEG placed yesterday. Surgery has been discussed with the patient's  and family who are present at the bedside. I have discussed the risks, benefits and alternatives with patient, including risks of infection and bleeding and an operative mortality risk of <1%. I also discussed the the alternative of not doing surgery and the consequences of that action. Questions have been answered and the patient is willing to proceed. Surgery scheduled for tomorrow in ICU. Mackenzie Oglesby,   10/25/2018  1:08 PM  Note reviewed, events of last 24 hours reviewed along with vital signs and I/Os and patient examined. Assessment and plans discussed and are as outlined above.      Cong Bethea MD, FACS, [facility], FACMELO, [place]

## 2018-10-25 NOTE — PROGRESS NOTES
TF started at 10 ml per hour with 100 ml H20 flushes q 2 hr. Will hold for CTA and hold at [facility] for tracheotomy in AM on 10/26/18    Oncoming nurse has been updated.       Albert Tian RN'

## 2018-10-25 NOTE — PROGRESS NOTES
10/25/18 1222   Vent Information   Vent Type 840   Vent Mode AC/VC   Vt Ordered 450 mL   Rate Set 14 bmp   Peak Flow 50 L/min   Pressure Support 0 cmH20   FiO2  75 %   Sensitivity 3   PEEP/CPAP 8   Vent Patient Data   Peak Inspiratory Pressure 19 cmH2O   Mean Airway Pressure 12 cmH20   Rate Measured 23 br/min   Vt Exhaled 469 mL   Minute Volume 10.9 Liters   I:E Ratio 1:1.50   Cough/Sputum   Sputum How Obtained Endotracheal;Suctioned   Cough Non-productive   Sputum Amount None   Spontaneous Breathing Trial (SBT) RT Doc   Pulse 114   SpO2 92 %   Breath Sounds   Right Upper Lobe Diminished   Right Middle Lobe Diminished   Right Lower Lobe Diminished   Left Upper Lobe Diminished   Left Lower Lobe Diminished   Additional Respiratory  Assessments   Resp 23   End Tidal CO2 30 (%)   Position Semi-Evangelista's   Alarm Settings   High Pressure Alarm 40 cmH2O   Low Minute Volume Alarm 3 L/min   High Respiratory Rate 45 br/min   Low Exhaled Vt  200 mL   Patient Observation   Observations 8 ETT, ambu bag @ bedside   ETT (adult)   Placement Date/Time: 10/24/18 0320   Timeout: Procedure  Preoxygenation: Yes  Mask Ventilation: Ventilated by mask (1)  Technique: Direct laryngoscopy  Type: Cuffed  Laryngoscope: GlideScope  Blade Size: 3  Insertion attempts: 1  Placement Verified By. ..    Secured at 23 cm   Measured From Lips   ET Placement Right   Secured By Commercial tube cruz

## 2018-10-25 NOTE — CARE COORDINATION
CM spoke to Dr Barb Bustillos who states that patient will likely have trach placed tomorrow. Writer met with family at bedside who confirms the above and discussed LTACH needs following trach placement. Family in agreement and asked that referrals be made to 1. [facility] and if not able to go there then 2. [facility]. CM gave left message of confirmation of referral for [facility] Nesbitt with [address]. Awaiting response. Patient will require precert for admission.     Aruna Apple RN

## 2018-10-25 NOTE — PROGRESS NOTES
Rebecca Macdonald is a 46 y.o. female patient.     Current Facility-Administered Medications   Medication Dose Route Frequency Provider Last Rate Last Dose    metoprolol (LOPRESSOR) injection 5 mg  5 mg Intravenous Q6H PRN Ken Mott MD   5 mg at 10/25/18 0249    potassium chloride 20 mEq/50 mL IVPB (Central Line)  20 mEq Intravenous PRN Ken Mott MD 50 mL/hr at 10/25/18 0638 20 mEq at 10/25/18 0638    metoprolol (LOPRESSOR) 5 MG/5ML injection             ibuprofen (ADVIL;MOTRIN) 100 MG/5ML suspension 400 mg  400 mg Oral Q6H PRN JAYANT Key CNP        propofol 1000 MG/100ML injection  10 mcg/kg/min Intravenous Titrated Ethan Murphy MD 19.4 mL/hr at 10/25/18 1036 35 mcg/kg/min at 10/25/18 1036    carboxymethylcellulose PF (THERATEARS) 1 % ophthalmic gel 1 drop  1 drop Both Eyes TID Taylor Avalos MD   1 drop at 10/25/18 1039    chlorhexidine (PERIDEX) 0.12 % solution 15 mL  15 mL Mouth/Throat BID Taylor Avalos MD   15 mL at 10/25/18 1040    mupirocin (BACTROBAN) 2 % ointment   Nasal BID Taylor Avalos MD        ipratropium-albuterol (DUONEB) nebulizer solution 1 ampule  1 ampule Inhalation Q4H Yaneth Chambers MD   1 ampule at 10/25/18 0847    acetaminophen (TYLENOL) 160 MG/5ML solution 650 mg  650 mg Per NG tube Q4H PRN JAYANT Key CNP   650 mg at 10/23/18 0951    metoprolol tartrate (LOPRESSOR) tablet 25 mg  25 mg Per NG tube BID Yaneth Chambers MD   25 mg at 10/25/18 1039    acetaminophen (TYLENOL) tablet 1,000 mg  1,000 mg Oral Once JAYANT Key - CNP        meropenem (MERREM) 1 g in sodium chloride 0.9 % 100 mL IVPB (mini-bag)  1 g Intravenous Q8H Yaneth Chambers  mL/hr at 10/25/18 1036 1 g at 10/25/18 1036    sodium chloride flush 0.9 % injection 10 mL  10 mL Intravenous 2 times per day Judd Jeans, MD   10 mL at 10/25/18 1041    sodium chloride flush 0.9 % injection 10 mL  10 mL Intravenous PRN Simeon Rahman MD   10 mL at failure with hypoxia and hypercapnia (HCC)    Abnormal echocardiogram    Anoxic brain damage (HCC)    Cardiomyopathy (HCC)    Pulmonary infiltrate    Atelectasis    Hypernatremia    Acute respiratory failure with hypoxemia (HCC)  Resolved Problems:    * No resolved hospital problems. *    Blood pressure 120/81, pulse 110, temperature 99.3 °F (37.4 °C), temperature source Core, resp. rate 21, height 5' 8\" (1.727 m), weight 201 lb 11.5 oz (91.5 kg), SpO2 94 %. Subjective:  Symptoms:  Stable. Pain:  She reports no pain. Objective:  General Appearance:  Not in pain. Vital signs: (most recent): Blood pressure 120/81, pulse 110, temperature 99.3 °F (37.4 °C), temperature source Core, resp. rate 21, height 5' 8\" (1.727 m), weight 201 lb 11.5 oz (91.5 kg), SpO2 94 %. Heart: Normal rate. Regular rhythm. S1 normal and S2 normal.    Abdomen: Abdomen is soft. Bowel sounds are normal.   There is no abdominal tenderness. Assessment & Plan  45 yo w asthma admitted w resp arrest, s/p intubation, c/b ?anoxic brain injury. Is now comatose/non responsive. Has also been having fever. Is on IV Vanc/Meropenem. PEG placed on 10/24 per family wishes looks good.     Plan:   1. Change PEG dressing  2. OK to use PEG for meds and feeding and OK to D/C NGT  3.  Will sign off    Blanca Bravo MD       (O) [phone]        Blanca Bravo MD  10/25/2018

## 2018-10-25 NOTE — PROGRESS NOTES
4 Eyes Skin Assessment     The patient is being assess for   Shift Handoff    I agree that 2 RN's have performed a thorough Head to Toe Skin Assessment on the patient. ALL assessment sites listed below have been assessed. Areas assessed by both nurses:   [x]   Head, Face, and Ears   [x]   Shoulders, Back, and Chest, Abdomen  [x]   Arms, Elbows, and Hands   [x]   Coccyx, Sacrum, and Ischium  [x]   Legs, Feet, and Heels            **SHARE this note so that the co-signing nurse is able to place an eSignature**    Co-signer eSignature: Electronically signed by Alyssa Oglesby RN on 10/24/18 at 9:13 PM    Does the Patient have Skin Breakdown?   No          Luis Antonio Prevention initiated:  Yes   Wound Care Orders initiated:  NA      [facility] nurse consulted for Pressure Injury (Stage 3,4, Unstageable, DTI, NWPT, Complex wounds)and New or Established Ostomies:  No      Primary Nurse eSignature: Electronically signed by Sohail Shepherd RN on 10/24/18 at 8:06 PM

## 2018-10-25 NOTE — PROGRESS NOTES
4 Eyes Skin Assessment     The patient is being assess for   Shift Handoff    I agree that 2 RN's have performed a thorough Head to Toe Skin Assessment on the patient. ALL assessment sites listed below have been assessed. Areas assessed by both nurses:   [x]   Head, Face, and Ears   [x]   Shoulders, Back, and Chest, Abdomen  [x]   Arms, Elbows, and Hands   [x]   Coccyx, Sacrum, and Ischium  [x]   Legs, Feet, and Heels        PEG/tracheotomy scehduled for 10/26/18    **SHARE this note so that the co-signing nurse is able to place an eSignature**    Co-signer eSignature: {Esignature:[id number]}    Does the Patient have Skin Breakdown?   No          Luis Antonio Prevention initiated:  Yes   Wound Care Orders initiated:  No      [facility] nurse consulted for Pressure Injury (Stage 3,4, Unstageable, DTI, NWPT, Complex wounds)and New or Established Ostomies:  NA      Primary Nurse eSignature: Electronically signed by Vidal Peña RN on 10/25/18 at 7:59 PM

## 2018-10-25 NOTE — PROGRESS NOTES
10/24/18 2031   Vent Information   Vent Type 840   Vent Mode AC/VC   Vt Ordered 450 mL   Rate Set 14 bmp   Peak Flow 50 L/min   Pressure Support 0 cmH20   FiO2  100 %   Sensitivity 3   PEEP/CPAP 8   Cuff Pressure (cm H2O) 30 cm H2O   Humidification Source HME   Vent Patient Data   Peak Inspiratory Pressure 17 cmH2O   Mean Airway Pressure 11 cmH20   Rate Measured 19 br/min   Vt Exhaled 473 mL   Minute Volume 8.84 Liters   I:E Ratio 1:1.70   Plateau Pressure 18 TUN11   Static Compliance 47.3 mL/cmH2O   Dynamic Compliance 52.55 mL/cmH2O   Cough/Sputum   Sputum How Obtained Endotracheal;Suctioned   Cough Productive   Sputum Amount Small   Sputum Color Creamy   Tenacity Thick   Spontaneous Breathing Trial (SBT) RT Doc   Pulse 92   SpO2 97 %   Breath Sounds   Right Upper Lobe Diminished   Right Middle Lobe Diminished   Right Lower Lobe Diminished   Left Upper Lobe Diminished   Left Lower Lobe Diminished   Additional Respiratory  Assessments   End Tidal CO2 33 (%)   Alarm Settings   High Pressure Alarm 45 cmH2O   Low Minute Volume Alarm 3 L/min   High Respiratory Rate 40 br/min   ETT (adult)   Placement Date/Time: 10/24/18 0320   Timeout: Procedure  Preoxygenation: Yes  Mask Ventilation: Ventilated by mask (1)  Technique: Direct laryngoscopy  Type: Cuffed  Laryngoscope: GlideScope  Blade Size: 3  Insertion attempts: 1  Placement Verified By. ..    Secured at 23 cm   Measured From [address] [place] [address],[address] By [employer] tube cruz   Site Condition Dry     ambu bag/mask @ bedside

## 2018-10-26 ENCOUNTER — APPOINTMENT (OUTPATIENT)
Dept: GENERAL RADIOLOGY | Age: 53
DRG: 005 | End: 2018-10-26
Payer: COMMERCIAL

## 2018-10-26 ENCOUNTER — ANESTHESIA (OUTPATIENT)
Dept: OPERATING ROOM | Age: 53
DRG: 005 | End: 2018-10-26
Payer: COMMERCIAL

## 2018-10-26 VITALS — DIASTOLIC BLOOD PRESSURE: 86 MMHG | OXYGEN SATURATION: 99 % | SYSTOLIC BLOOD PRESSURE: 139 MMHG

## 2018-10-26 LAB
ANION GAP SERPL CALCULATED.3IONS-SCNC: 11 MMOL/L (ref 3–16)
ANION GAP SERPL CALCULATED.3IONS-SCNC: 9 MMOL/L (ref 3–16)
BUN BLDV-MCNC: 52 MG/DL (ref 7–20)
BUN BLDV-MCNC: 58 MG/DL (ref 7–20)
CALCIUM SERPL-MCNC: 9.1 MG/DL (ref 8.3–10.6)
CALCIUM SERPL-MCNC: 9.3 MG/DL (ref 8.3–10.6)
CHLORIDE BLD-SCNC: 106 MMOL/L (ref 99–110)
CHLORIDE BLD-SCNC: 109 MMOL/L (ref 99–110)
CO2: 34 MMOL/L (ref 21–32)
CO2: 36 MMOL/L (ref 21–32)
CREAT SERPL-MCNC: 0.7 MG/DL (ref 0.6–1.1)
CREAT SERPL-MCNC: 0.9 MG/DL (ref 0.6–1.1)
CULTURE, RESPIRATORY: NORMAL
GFR AFRICAN AMERICAN: >60
GFR AFRICAN AMERICAN: >60
GFR NON-AFRICAN AMERICAN: >60
GFR NON-AFRICAN AMERICAN: >60
GLUCOSE BLD-MCNC: 115 MG/DL (ref 70–99)
GLUCOSE BLD-MCNC: 124 MG/DL (ref 70–99)
GRAM STAIN RESULT: NORMAL
HCT VFR BLD CALC: 41.4 % (ref 36–48)
HEMOGLOBIN: 13.3 G/DL (ref 12–16)
MAGNESIUM: 3 MG/DL (ref 1.8–2.4)
MCH RBC QN AUTO: 32.4 PG (ref 26–34)
MCHC RBC AUTO-ENTMCNC: 32.1 G/DL (ref 31–36)
MCV RBC AUTO: 100.7 FL (ref 80–100)
PDW BLD-RTO: 14.4 % (ref 12.4–15.4)
PLATELET # BLD: 283 K/UL (ref 135–450)
PMV BLD AUTO: 9.7 FL (ref 5–10.5)
POTASSIUM REFLEX MAGNESIUM: 3.9 MMOL/L (ref 3.5–5.1)
POTASSIUM SERPL-SCNC: 4.2 MMOL/L (ref 3.5–5.1)
RBC # BLD: 4.12 M/UL (ref 4–5.2)
SODIUM BLD-SCNC: 151 MMOL/L (ref 136–145)
SODIUM BLD-SCNC: 154 MMOL/L (ref 136–145)
WBC # BLD: 22.8 K/UL (ref 4–11)

## 2018-10-26 PROCEDURE — 2000000000 HC ICU R&B

## 2018-10-26 PROCEDURE — 94750 HC PULMONARY COMPLIANCE STUDY: CPT

## 2018-10-26 PROCEDURE — 94761 N-INVAS EAR/PLS OXIMETRY MLT: CPT

## 2018-10-26 PROCEDURE — 3700000000 HC ANESTHESIA ATTENDED CARE: Performed by: THORACIC SURGERY (CARDIOTHORACIC VASCULAR SURGERY)

## 2018-10-26 PROCEDURE — 3700000001 HC ADD 15 MINUTES (ANESTHESIA): Performed by: THORACIC SURGERY (CARDIOTHORACIC VASCULAR SURGERY)

## 2018-10-26 PROCEDURE — 6370000000 HC RX 637 (ALT 250 FOR IP): Performed by: INTERNAL MEDICINE

## 2018-10-26 PROCEDURE — 2709999900 HC NON-CHARGEABLE SUPPLY: Performed by: THORACIC SURGERY (CARDIOTHORACIC VASCULAR SURGERY)

## 2018-10-26 PROCEDURE — 93005 ELECTROCARDIOGRAM TRACING: CPT | Performed by: INTERNAL MEDICINE

## 2018-10-26 PROCEDURE — 94640 AIRWAY INHALATION TREATMENT: CPT

## 2018-10-26 PROCEDURE — 2580000003 HC RX 258: Performed by: NURSE ANESTHETIST, CERTIFIED REGISTERED

## 2018-10-26 PROCEDURE — 85027 COMPLETE CBC AUTOMATED: CPT

## 2018-10-26 PROCEDURE — 2580000003 HC RX 258: Performed by: INTERNAL MEDICINE

## 2018-10-26 PROCEDURE — 80048 BASIC METABOLIC PNL TOTAL CA: CPT

## 2018-10-26 PROCEDURE — C1769 GUIDE WIRE: HCPCS | Performed by: THORACIC SURGERY (CARDIOTHORACIC VASCULAR SURGERY)

## 2018-10-26 PROCEDURE — 6360000002 HC RX W HCPCS: Performed by: INTERNAL MEDICINE

## 2018-10-26 PROCEDURE — 94770 HC ETCO2 MONITOR DAILY: CPT

## 2018-10-26 PROCEDURE — 99291 CRITICAL CARE FIRST HOUR: CPT | Performed by: INTERNAL MEDICINE

## 2018-10-26 PROCEDURE — 6360000002 HC RX W HCPCS: Performed by: HOSPITALIST

## 2018-10-26 PROCEDURE — S0028 INJECTION, FAMOTIDINE, 20 MG: HCPCS | Performed by: INTERNAL MEDICINE

## 2018-10-26 PROCEDURE — 94003 VENT MGMT INPAT SUBQ DAY: CPT

## 2018-10-26 PROCEDURE — 6360000002 HC RX W HCPCS: Performed by: NURSE ANESTHETIST, CERTIFIED REGISTERED

## 2018-10-26 PROCEDURE — 0B113F4 BYPASS TRACHEA TO CUTANEOUS WITH TRACHEOSTOMY DEVICE, PERCUTANEOUS APPROACH: ICD-10-PCS | Performed by: THORACIC SURGERY (CARDIOTHORACIC VASCULAR SURGERY)

## 2018-10-26 PROCEDURE — 83735 ASSAY OF MAGNESIUM: CPT

## 2018-10-26 PROCEDURE — 2500000003 HC RX 250 WO HCPCS: Performed by: NURSE ANESTHETIST, CERTIFIED REGISTERED

## 2018-10-26 PROCEDURE — 3600000012 HC SURGERY LEVEL 2 ADDTL 15MIN: Performed by: THORACIC SURGERY (CARDIOTHORACIC VASCULAR SURGERY)

## 2018-10-26 PROCEDURE — 3600000002 HC SURGERY LEVEL 2 BASE: Performed by: THORACIC SURGERY (CARDIOTHORACIC VASCULAR SURGERY)

## 2018-10-26 PROCEDURE — 36592 COLLECT BLOOD FROM PICC: CPT

## 2018-10-26 PROCEDURE — 2500000003 HC RX 250 WO HCPCS: Performed by: INTERNAL MEDICINE

## 2018-10-26 PROCEDURE — 2700000000 HC OXYGEN THERAPY PER DAY

## 2018-10-26 PROCEDURE — 71045 X-RAY EXAM CHEST 1 VIEW: CPT

## 2018-10-26 RX ORDER — PROPOFOL 10 MG/ML
INJECTION, EMULSION INTRAVENOUS PRN
Status: DISCONTINUED | OUTPATIENT
Start: 2018-10-26 | End: 2018-10-26 | Stop reason: SDUPTHER

## 2018-10-26 RX ORDER — SODIUM CHLORIDE, SODIUM LACTATE, POTASSIUM CHLORIDE, CALCIUM CHLORIDE 600; 310; 30; 20 MG/100ML; MG/100ML; MG/100ML; MG/100ML
INJECTION, SOLUTION INTRAVENOUS CONTINUOUS PRN
Status: DISCONTINUED | OUTPATIENT
Start: 2018-10-26 | End: 2018-10-26 | Stop reason: SDUPTHER

## 2018-10-26 RX ORDER — ROCURONIUM BROMIDE 10 MG/ML
INJECTION, SOLUTION INTRAVENOUS PRN
Status: DISCONTINUED | OUTPATIENT
Start: 2018-10-26 | End: 2018-10-26 | Stop reason: SDUPTHER

## 2018-10-26 RX ADMIN — MUPIROCIN: 20 OINTMENT TOPICAL at 09:29

## 2018-10-26 RX ADMIN — PROPOFOL 30 MCG/KG/MIN: 10 INJECTION, EMULSION INTRAVENOUS at 12:17

## 2018-10-26 RX ADMIN — IPRATROPIUM BROMIDE AND ALBUTEROL SULFATE 1 AMPULE: .5; 3 SOLUTION RESPIRATORY (INHALATION) at 20:39

## 2018-10-26 RX ADMIN — Medication 15 ML: at 09:28

## 2018-10-26 RX ADMIN — METOPROLOL TARTRATE 25 MG: 25 TABLET ORAL at 20:56

## 2018-10-26 RX ADMIN — ROCURONIUM BROMIDE 40 MG: 10 SOLUTION INTRAVENOUS at 10:56

## 2018-10-26 RX ADMIN — FAMOTIDINE 20 MG: 10 INJECTION, SOLUTION INTRAVENOUS at 09:26

## 2018-10-26 RX ADMIN — PROPOFOL 30 MCG/KG/MIN: 10 INJECTION, EMULSION INTRAVENOUS at 23:15

## 2018-10-26 RX ADMIN — PROPOFOL 100 MG: 10 INJECTION, EMULSION INTRAVENOUS at 10:56

## 2018-10-26 RX ADMIN — MUPIROCIN: 20 OINTMENT TOPICAL at 20:57

## 2018-10-26 RX ADMIN — CARBOXYMETHYLCELLULOSE SODIUM 1 DROP: 10 GEL OPHTHALMIC at 14:04

## 2018-10-26 RX ADMIN — IPRATROPIUM BROMIDE AND ALBUTEROL SULFATE 1 AMPULE: .5; 3 SOLUTION RESPIRATORY (INHALATION) at 00:15

## 2018-10-26 RX ADMIN — FAMOTIDINE 20 MG: 10 INJECTION, SOLUTION INTRAVENOUS at 20:56

## 2018-10-26 RX ADMIN — PROPOFOL 37 MCG/KG/MIN: 10 INJECTION, EMULSION INTRAVENOUS at 07:08

## 2018-10-26 RX ADMIN — IPRATROPIUM BROMIDE AND ALBUTEROL SULFATE 1 AMPULE: .5; 3 SOLUTION RESPIRATORY (INHALATION) at 08:41

## 2018-10-26 RX ADMIN — Medication 15 ML: at 20:58

## 2018-10-26 RX ADMIN — MEROPENEM 1 G: 1 INJECTION, POWDER, FOR SOLUTION INTRAVENOUS at 01:39

## 2018-10-26 RX ADMIN — IPRATROPIUM BROMIDE AND ALBUTEROL SULFATE 1 AMPULE: .5; 3 SOLUTION RESPIRATORY (INHALATION) at 16:04

## 2018-10-26 RX ADMIN — DOCUSATE SODIUM 100 MG: 50 LIQUID ORAL at 20:56

## 2018-10-26 RX ADMIN — IPRATROPIUM BROMIDE AND ALBUTEROL SULFATE 1 AMPULE: .5; 3 SOLUTION RESPIRATORY (INHALATION) at 03:51

## 2018-10-26 RX ADMIN — PROPOFOL 37 MCG/KG/MIN: 10 INJECTION, EMULSION INTRAVENOUS at 01:39

## 2018-10-26 RX ADMIN — CARBOXYMETHYLCELLULOSE SODIUM 1 DROP: 10 GEL OPHTHALMIC at 20:56

## 2018-10-26 RX ADMIN — Medication 10 ML: at 09:26

## 2018-10-26 RX ADMIN — IPRATROPIUM BROMIDE AND ALBUTEROL SULFATE 1 AMPULE: .5; 3 SOLUTION RESPIRATORY (INHALATION) at 11:53

## 2018-10-26 RX ADMIN — CARBOXYMETHYLCELLULOSE SODIUM 1 DROP: 10 GEL OPHTHALMIC at 09:26

## 2018-10-26 RX ADMIN — SODIUM CHLORIDE, POTASSIUM CHLORIDE, SODIUM LACTATE AND CALCIUM CHLORIDE: 600; 310; 30; 20 INJECTION, SOLUTION INTRAVENOUS at 10:55

## 2018-10-26 RX ADMIN — Medication 10 ML: at 20:58

## 2018-10-26 ASSESSMENT — PULMONARY FUNCTION TESTS
PIF_VALUE: 19
PIF_VALUE: 18
PIF_VALUE: 19
PIF_VALUE: 19
PIF_VALUE: 18
PIF_VALUE: 19
PIF_VALUE: 21
PIF_VALUE: 22
PIF_VALUE: 18
PIF_VALUE: 17
PIF_VALUE: 20
PIF_VALUE: 18
PIF_VALUE: 18
PIF_VALUE: 19
PIF_VALUE: 17
PIF_VALUE: 18
PIF_VALUE: 18
PIF_VALUE: 16
PIF_VALUE: 37
PIF_VALUE: 18
PIF_VALUE: 20

## 2018-10-26 NOTE — ANESTHESIA PRE PROCEDURE
day Melody Moore MD   10 mL at 10/26/18 0926    sodium chloride flush 0.9 % injection 10 mL  10 mL Intravenous PRN Melody Moore MD   10 mL at 32/60/49 5918    folic acid (FOLVITE) tablet 1 mg  1 mg Oral Daily Lucy Chavis MD   1 mg at 10/25/18 1039    acetaminophen (TYLENOL) suppository 650 mg  650 mg Rectal Q4H PRN Melody Moore MD   650 mg at 10/23/18 1724    docusate (COLACE) 50 MG/5ML liquid 100 mg  100 mg Per NG tube BID Melody Moore MD   100 mg at 10/25/18 2056    labetalol (NORMODYNE;TRANDATE) injection 20 mg  20 mg Intravenous Q4H PRN Melody Moore MD   20 mg at 10/21/18 0610    influenza quadrivalent split vaccine (FLUZONE;FLUARIX;FLULAVAL;AFLURIA) injection 0.5 mL  0.5 mL Intramuscular Once Lucy Chavis MD   Stopped at 10/18/18 1156    acetaminophen (TYLENOL) tablet 650 mg  650 mg Oral Q4H PRN Filomena Villalobos MD   650 mg at 10/21/18 1505    famotidine (PEPCID) injection 20 mg  20 mg Intravenous BID Melody Moore MD   20 mg at 10/26/18 0926    magnesium hydroxide (MILK OF MAGNESIA) 400 MG/5ML suspension 30 mL  30 mL Oral Daily PRN Filomena Villalobos MD        ondansetron [facility]) injection 4 mg  4 mg Intravenous Q6H PRN Filomena Villalobos MD        enoxaparin (LOVENOX) injection 40 mg  40 mg Subcutaneous Daily Filomena Villalobos MD   Stopped at 10/26/18 0900    potassium chloride (KLOR-CON M) extended release tablet 40 mEq  40 mEq Oral PRN Filomena Villalobos MD        Or    potassium chloride 20 MEQ/15ML (10%) oral solution 40 mEq  40 mEq Oral PRN Filomena Villalobos MD   40 mEq at 10/25/18 1507    Or    potassium chloride 10 mEq/100 mL IVPB (Peripheral Line)  10 mEq Intravenous PRN Filomena Villalobos  mL/hr at 10/21/18 0232 10 mEq at 10/21/18 0232    magnesium sulfate 1 g in dextrose 5% 100 mL IVPB  1 g Intravenous PRN Filomena Villalobos MD           Allergies:     Allergies   Allergen Reactions    Aspirin Hives    Nsaids      nausea    Penicillins Height:                                                  BP Readings from Last 3 Encounters:   10/26/18 108/77   09/03/17 (!) 140/98   01/27/15 146/87       NPO Status:                                                   Date of last liquid consumption: 10/14/18                        Date of last solid food consumption: 10/14/18    BMI:   Wt Readings from Last 3 Encounters:   10/26/18 197 lb 1.5 oz (89.4 kg)   09/03/17 212 lb (96.2 kg)   01/27/15 210 lb (95.3 kg)     Body mass index is 29.97 kg/m².     CBC:   Lab Results   Component Value Date    WBC 22.8 10/26/2018    RBC 4.12 10/26/2018    HGB 13.3 10/26/2018    HCT 41.4 10/26/2018    .7 10/26/2018    RDW 14.4 10/26/2018     10/26/2018       CMP:   Lab Results   Component Value Date     10/26/2018    K 3.9 10/26/2018     10/26/2018    CO2 36 10/26/2018    BUN 58 10/26/2018    CREATININE 0.9 10/26/2018    GFRAA >60 10/26/2018    GFRAA >60 02/14/2011    AGRATIO 1.4 10/15/2018    LABGLOM >60 10/26/2018    GLUCOSE 124 10/26/2018    PROT 5.9 10/15/2018    PROT 7.3 02/14/2011    CALCIUM 9.3 10/26/2018    BILITOT <0.2 10/15/2018    ALKPHOS 59 10/15/2018    AST 26 10/15/2018    ALT 32 10/15/2018       POC Tests:   Recent Labs      10/24/18   0003   POCGLU  135*       Coags:   Lab Results   Component Value Date    PROTIME 10.8 10/14/2018    INR 0.95 10/14/2018    APTT 37.8 12/12/2010       HCG (If Applicable): No results found for: PREGTESTUR, PREGSERUM, HCG, HCGQUANT     ABGs:   Lab Results   Component Value Date    PHART 7.443 10/24/2018    PO2ART 63.7 10/24/2018    ABM9XPY 50.7 10/24/2018    OLI4HIO 33.9 10/24/2018    BEART 7.4 10/24/2018    R5LIHCCS 92.4 10/24/2018        Type & Screen (If Applicable):  No results found for: LABABO, LABRH    Anesthesia Evaluation   no history of anesthetic complications:   Airway:         Dental:          Pulmonary:   (+) asthma:                           ROS comment: intubated   Cardiovascular:    (+)

## 2018-10-26 NOTE — PROGRESS NOTES
INPATIENT PULMONARY CRITICAL CARE PROGRESS NOTE      Reason for visit     intubated, respiratory arrest/V. fib arrest.    SUBJECTIVE:  Patient when seen this morning continues to be critically around on mechanical vent support, patient underwent bronchoscopy yesterday and large amounts of mucous plugs were removed, patient continues to require high oxygen supplementation to maintain saturation and was still on 75% oxygen, patient is on IV sedation to maintain patient's regular synchrony, patient continues to have fever and the T-max was 100.7°F, patient has sinus tachycardia on the monitor, patient has thick mucous plugs in the ET tube, patient was on 100% oxygen on the ventilator to maintain saturation, patient has modest  resp secretions in ETT, patient underwent PEG tube placement yesterday;;patient had adequate urine output overnight with curative fluid balance of -5.5 L, patient's glycemic control was slightly on the higher side but acceptable;no other ROS , could be obtained because of patient's clinical status           Physical Exam:  Blood pressure 114/83, pulse 84, temperature 97.8 °F (36.6 °C), temperature source Core, resp. rate 15, height 5' 8\" (1.727 m), weight 201 lb 11.5 oz (91.5 kg), SpO2 99 %.'   Constitutional:  No acute distress. On mechanical support   HENT:  Oropharynx is clear and moist. No thyromegaly. NGT in place   Eyes:  Conjunctivae are normal. Pupils equal, round, and reactive to light. No scleral icterus. Neck: . No tracheal deviation present. No obvious thyroid mass. Cardiovascular: Sinus tachycardia, normal heart sounds. No right ventricular heave. No lower extremity edema. Pulmonary/Chest: No wheezes. Basilar  rales. Chest wall is not dull to percussion. No accessory muscle usage or stridor. Abdominal: Soft. Bowel sounds present. No distension or hernia. No tenderness. Musculoskeletal: No cyanosis. No clubbing. No obvious joint deformity.    Lymphadenopathy: No cervical or supraclavicular adenopathy. Skin: Skin is warm and dry. No rash or nodules on the exposed extremities. Neurologic: Not responsive and sedated        Results:  CBC:   Recent Labs      10/23/18   0809  10/24/18   0528  10/25/18   0518   WBC  18.3*  20.8*  22.2*   HGB  16.0  15.5  14.5   HCT  48.0  47.2  45.2   MCV  98.9  100.1*  100.3*   PLT  393  336  324     BMP:   Recent Labs      10/23/18   0809  10/24/18   0528  10/25/18   0518   NA  150*  150*  154*   K  3.4*  3.5  3.4*   CL  103  101  107   CO2  33*  36*  35*   BUN  57*  61*  60*   CREATININE  1.0  1.1  0.8       Imaging:  I have reviewed radiology images personally. XR CHEST PORTABLE   Final Result   Stable examination with persistent right basilar opacity. This may represent   atelectasis or consolidation from pneumonia. Stable small bilateral pleural effusions and cardiomegaly suggesting mild CHF. MRI BRAIN WO CONTRAST   Final Result   Motion artifact degrades the images. There is subtle area of signal abnormality on the diffusion-weighted images   involving the parietal and occipital lobes. This may be artifactual as there   is no corresponding FLAIR or T2-weighted abnormality. Mild ischemia cannot   be entirely excluded. Clinically correlate. Follow-up MRI could be   considered. No other brain parenchymal abnormality. XR CHEST PORTABLE   Final Result   Mild improvement of previously seen CHF. Stable small right pleural effusion   with right basilar atelectasis. XR CHEST PORTABLE   Final Result   Stable examination with moderate CHF. XR CHEST PORTABLE   Final Result   PICC line with tip in the SVC. Enteric tube going into the stomach. Cardiomegaly with pleural effusions and pulmonary edema suggesting CHF. XR ABDOMEN (KUB) (SINGLE AP VIEW)   Final Result   Feeding tube tip projects over the distal stomach in the peripyloric region. No enteric drainage tube noted.          IR PICC WO Fungal element. .. Results for Mary Grace Gonzalez (MRN [id number]) as of 10/25/2018 21:09   Ref. Range 10/19/2018 04:45 10/20/2018 04:43 10/24/2018 00:52 10/24/2018 05:28   O2 Therapy Unknown Unknown Unknown Unknown Unknown   Hemoglobin, Art, Extended Latest Ref Range: 12.0 - 16.0 g/dL 13.4 13.3 18.2 (H) 22.5 (H)   pH, Arterial Latest Ref Range: 7.350 - 7.450  7.518 (H) 7.519 (H) 7.524 (H) 7.443   pCO2, Arterial Latest Ref Range: 35.0 - 45.0 mmHg 36.3 34.5 (L) 44.7 50.7 (H)   pO2, Arterial Latest Ref Range: 75.0 - 108.0 mmHg 65.3 (L) 65.5 (L) 56.5 (L) 63.7 (L)   HCO3, Arterial Latest Ref Range: 21.0 - 29.0 mmol/L 28.8 27.5 36.0 (H) 33.9 (H)   TCO2 (calc), Art Latest Ref Range: Not Established mmol/L 30.0 28.5 37.4 35.5   Base Excess, Arterial Latest Ref Range: -3.0 - 3.0 mmol/L 5.9 (H) 4.8 (H) 11.5 (H) 7.4 (H)   O2 Sat, Arterial Latest Ref Range: >92 % 94.3 94.0 91.0 (L) 92.4 (L)   O2 Content, Arterial Latest Ref Range: Not Established mL/dL 18 18 23 29   Methemoglobin, Arterial Latest Ref Range: <1.5 % 0.3 0.4 0.5 0.7   Carboxyhgb, Arterial Latest Ref Range: 0.0 - 1.5 % 0.9 0.0 0.2 0.3         Recent ECHO- Conclusions      Summary   -- The left ventricular systolic function is moderately reduced with an   ejection fraction of 40 %.   There is hypokinesis of the inferior, anteroseptal and septal walls. Left   ventricular cavity size is severely dilated. Normal left ventricular   diastolic filling pressure. Normal left ventricular wall thickness.   -- The right ventricle is moderately enlarged.  Right ventricular systolic   function is normal .       Assessment:  Active Problems:    Asthma    V-tach (HCC)    Sepsis ([facility] [address]    Respiratory arrest before cardiac arrest [facility])    Cardiac arrest with ventricular fibrillation ([facility] [address]    Respiratory failure with hypoxia and hypercapnia ([facility])    Abnormal echocardiogram    Anoxic brain damage ([facility])    Cardiomyopathy ([facility])    Pulmonary infiltrate    Atelectasis    Hypernatremia Acute respiratory failure with hypoxemia (HCC)  Resolved Problems:    * No resolved hospital problems. *          Plan:   · Ventilatory support to keep saturation between 90-94%  · Patient continues to require high oxygen requirements on the ventilator when seen  · Ventilatory waveforms and settings reviewed  · Ventilator changes made  · IV sedation to maintain patient -ventilator syncrony   · Pulmonary toilet-s/p bronchoscopy  · Patient's A-a gradient is too high for the clinical eval -would get CTPA to R/O PE   · Patient to continue on  meropenem-to be reassessed as per clinical status and cultures  · Bronchodilators  · Patient is s/p  PEG placement -can be used today   · Enteral feeds as per metabolic support beings started  · Trend sodium which is going higher   ·  free water to decrease the sodium levels  · Patient is not able to protect her airways and discussed with the patient's family about possible need for tracheostomy in the near future-family  agrreable -would request surgery consult for the same   · Enteral Lopressor with parameters  · Bronchodilators   · No need for any steroids for now  · . PUD and DVT prophylaxis as per IM      Case discussed with family/case management and ICU team      Critical care time spent -35 minutes(exclusive of any procedures)        Electronically signed by:  Boo Arzola MD    10/25/2018    9:06 PM.

## 2018-10-26 NOTE — PROGRESS NOTES
Will have tracheostomy today.     Cardiac arrest:  Ventricular fibrillation noted at the onset. Acute coronary syndrome causing VF is likely to be the reason, but impossible to be certain  Will require ischemia evaluation with [facility] once patient is medically stable and more alert. Unclear at this time, when this may happen. Cardiology following     Acute encephalopathy:   The obvious explanation is anoxic brain injury. Neurology consulted. The impression is that the patient would require 2-3 months to see level of recovery  EEG was negative for epileptiform activity  Family not interested in end of life discussions. Continue supportive management and evaluate periodically. Dysphagia:  PEG tube inserted. Tolerated well. Tracheostomy to be performed today.     HTN:  Holding ACEI, on metoprolol. Blood pressure is stable and controlled. D/w family at bedside, questions are answered.     D/w nursing    DVT Prophylaxis: Lovenox  Diet: Diet NPO, After Midnight  Code Status: Limited    PT/OT Eval Status: not possible    Dispo - inpatient, will be transferred to long-term acute care hospital after tracheostomy, when insurance approval received     Agustin Gould MD

## 2018-10-26 NOTE — PLAN OF CARE
Problem: Falls - Risk of:  Goal: Absence of physical injury  Absence of physical injury   Outcome: Ongoing  Pt will remain free of injury

## 2018-10-27 ENCOUNTER — APPOINTMENT (OUTPATIENT)
Dept: GENERAL RADIOLOGY | Age: 53
DRG: 005 | End: 2018-10-27
Payer: COMMERCIAL

## 2018-10-27 LAB
ALBUMIN SERPL-MCNC: 3 G/DL (ref 3.4–5)
ALP BLD-CCNC: 71 U/L (ref 40–129)
ALT SERPL-CCNC: 68 U/L (ref 10–40)
ANION GAP SERPL CALCULATED.3IONS-SCNC: 9 MMOL/L (ref 3–16)
AST SERPL-CCNC: 72 U/L (ref 15–37)
BASE EXCESS ARTERIAL: 9.7 MMOL/L (ref -3–3)
BILIRUB SERPL-MCNC: 1 MG/DL (ref 0–1)
BILIRUBIN DIRECT: 0.6 MG/DL (ref 0–0.3)
BILIRUBIN, INDIRECT: 0.4 MG/DL (ref 0–1)
BLOOD CULTURE, ROUTINE: NORMAL
BUN BLDV-MCNC: 49 MG/DL (ref 7–20)
CALCIUM SERPL-MCNC: 9 MG/DL (ref 8.3–10.6)
CARBOXYHEMOGLOBIN ARTERIAL: 0.1 % (ref 0–1.5)
CHLORIDE BLD-SCNC: 112 MMOL/L (ref 99–110)
CO2: 34 MMOL/L (ref 21–32)
CREAT SERPL-MCNC: 0.6 MG/DL (ref 0.6–1.1)
CULTURE, BLOOD 2: NORMAL
EKG ATRIAL RATE: 112 BPM
EKG DIAGNOSIS: NORMAL
EKG P AXIS: 82 DEGREES
EKG P-R INTERVAL: 134 MS
EKG Q-T INTERVAL: 358 MS
EKG QRS DURATION: 80 MS
EKG QTC CALCULATION (BAZETT): 488 MS
EKG R AXIS: 6 DEGREES
EKG T AXIS: 94 DEGREES
EKG VENTRICULAR RATE: 112 BPM
GFR AFRICAN AMERICAN: >60
GFR NON-AFRICAN AMERICAN: >60
GLUCOSE BLD-MCNC: 118 MG/DL (ref 70–99)
HCO3 ARTERIAL: 35.7 MMOL/L (ref 21–29)
HCT VFR BLD CALC: 39.1 % (ref 36–48)
HEMOGLOBIN, ART, EXTENDED: 13.7 G/DL (ref 12–16)
HEMOGLOBIN: 12.3 G/DL (ref 12–16)
MAGNESIUM: 3 MG/DL (ref 1.8–2.4)
MCH RBC QN AUTO: 31.7 PG (ref 26–34)
MCHC RBC AUTO-ENTMCNC: 31.4 G/DL (ref 31–36)
MCV RBC AUTO: 100.9 FL (ref 80–100)
METHEMOGLOBIN ARTERIAL: 0.5 %
O2 CONTENT ARTERIAL: 19 ML/DL
O2 SAT, ARTERIAL: 97.9 %
O2 THERAPY: ABNORMAL
PCO2 ARTERIAL: 53.5 MMHG (ref 35–45)
PDW BLD-RTO: 14.3 % (ref 12.4–15.4)
PH ARTERIAL: 7.44 (ref 7.35–7.45)
PLATELET # BLD: 222 K/UL (ref 135–450)
PMV BLD AUTO: 10.3 FL (ref 5–10.5)
PO2 ARTERIAL: 112.8 MMHG (ref 75–108)
POTASSIUM REFLEX MAGNESIUM: 3.3 MMOL/L (ref 3.5–5.1)
RBC # BLD: 3.88 M/UL (ref 4–5.2)
SODIUM BLD-SCNC: 155 MMOL/L (ref 136–145)
TCO2 ARTERIAL: 37.3 MMOL/L
TOTAL PROTEIN: 6.6 G/DL (ref 6.4–8.2)
WBC # BLD: 16.6 K/UL (ref 4–11)

## 2018-10-27 PROCEDURE — 99152 MOD SED SAME PHYS/QHP 5/>YRS: CPT | Performed by: INTERNAL MEDICINE

## 2018-10-27 PROCEDURE — 2580000003 HC RX 258: Performed by: INTERNAL MEDICINE

## 2018-10-27 PROCEDURE — 6370000000 HC RX 637 (ALT 250 FOR IP): Performed by: INTERNAL MEDICINE

## 2018-10-27 PROCEDURE — 0B9J8ZX DRAINAGE OF LEFT LOWER LUNG LOBE, VIA NATURAL OR ARTIFICIAL OPENING ENDOSCOPIC, DIAGNOSTIC: ICD-10-PCS | Performed by: INTERNAL MEDICINE

## 2018-10-27 PROCEDURE — 6360000002 HC RX W HCPCS: Performed by: INTERNAL MEDICINE

## 2018-10-27 PROCEDURE — 94750 HC PULMONARY COMPLIANCE STUDY: CPT

## 2018-10-27 PROCEDURE — 3609010800 HC BRONCHOSCOPY ALVEOLAR LAVAGE: Performed by: INTERNAL MEDICINE

## 2018-10-27 PROCEDURE — 2000000000 HC ICU R&B

## 2018-10-27 PROCEDURE — S0028 INJECTION, FAMOTIDINE, 20 MG: HCPCS | Performed by: INTERNAL MEDICINE

## 2018-10-27 PROCEDURE — 71045 X-RAY EXAM CHEST 1 VIEW: CPT

## 2018-10-27 PROCEDURE — 99233 SBSQ HOSP IP/OBS HIGH 50: CPT | Performed by: INTERNAL MEDICINE

## 2018-10-27 PROCEDURE — 31646 BRNCHSC W/THER ASPIR SBSQ: CPT | Performed by: INTERNAL MEDICINE

## 2018-10-27 PROCEDURE — 2500000003 HC RX 250 WO HCPCS: Performed by: INTERNAL MEDICINE

## 2018-10-27 PROCEDURE — 2709999900 HC NON-CHARGEABLE SUPPLY: Performed by: INTERNAL MEDICINE

## 2018-10-27 PROCEDURE — 94770 HC ETCO2 MONITOR DAILY: CPT

## 2018-10-27 PROCEDURE — 94640 AIRWAY INHALATION TREATMENT: CPT

## 2018-10-27 PROCEDURE — 82803 BLOOD GASES ANY COMBINATION: CPT

## 2018-10-27 PROCEDURE — 80076 HEPATIC FUNCTION PANEL: CPT

## 2018-10-27 PROCEDURE — 87205 SMEAR GRAM STAIN: CPT

## 2018-10-27 PROCEDURE — 99231 SBSQ HOSP IP/OBS SF/LOW 25: CPT | Performed by: THORACIC SURGERY (CARDIOTHORACIC VASCULAR SURGERY)

## 2018-10-27 PROCEDURE — 94003 VENT MGMT INPAT SUBQ DAY: CPT

## 2018-10-27 PROCEDURE — 83735 ASSAY OF MAGNESIUM: CPT

## 2018-10-27 PROCEDURE — 0B9F8ZX DRAINAGE OF RIGHT LOWER LUNG LOBE, VIA NATURAL OR ARTIFICIAL OPENING ENDOSCOPIC, DIAGNOSTIC: ICD-10-PCS | Performed by: INTERNAL MEDICINE

## 2018-10-27 PROCEDURE — 87070 CULTURE OTHR SPECIMN AEROBIC: CPT

## 2018-10-27 PROCEDURE — 80048 BASIC METABOLIC PNL TOTAL CA: CPT

## 2018-10-27 PROCEDURE — 2700000000 HC OXYGEN THERAPY PER DAY

## 2018-10-27 PROCEDURE — 94761 N-INVAS EAR/PLS OXIMETRY MLT: CPT

## 2018-10-27 PROCEDURE — 93010 ELECTROCARDIOGRAM REPORT: CPT | Performed by: INTERNAL MEDICINE

## 2018-10-27 PROCEDURE — 85027 COMPLETE CBC AUTOMATED: CPT

## 2018-10-27 RX ORDER — PROPOFOL 10 MG/ML
10 INJECTION, EMULSION INTRAVENOUS
Status: DISCONTINUED | OUTPATIENT
Start: 2018-10-27 | End: 2018-10-28

## 2018-10-27 RX ORDER — ACETYLCYSTEINE 200 MG/ML
SOLUTION ORAL; RESPIRATORY (INHALATION) PRN
Status: DISCONTINUED | OUTPATIENT
Start: 2018-10-27 | End: 2018-10-27 | Stop reason: HOSPADM

## 2018-10-27 RX ORDER — LIDOCAINE HYDROCHLORIDE 20 MG/ML
INJECTION, SOLUTION EPIDURAL; INFILTRATION; INTRACAUDAL; PERINEURAL PRN
Status: DISCONTINUED | OUTPATIENT
Start: 2018-10-27 | End: 2018-10-27 | Stop reason: HOSPADM

## 2018-10-27 RX ORDER — 0.9 % SODIUM CHLORIDE 0.9 %
INTRAVENOUS SOLUTION INTRAVENOUS CONTINUOUS PRN
Status: COMPLETED | OUTPATIENT
Start: 2018-10-27 | End: 2018-10-27

## 2018-10-27 RX ADMIN — CARBOXYMETHYLCELLULOSE SODIUM 1 DROP: 10 GEL OPHTHALMIC at 09:13

## 2018-10-27 RX ADMIN — FAMOTIDINE 20 MG: 10 INJECTION, SOLUTION INTRAVENOUS at 09:22

## 2018-10-27 RX ADMIN — CARBOXYMETHYLCELLULOSE SODIUM 1 DROP: 10 GEL OPHTHALMIC at 20:31

## 2018-10-27 RX ADMIN — ENOXAPARIN SODIUM 40 MG: 40 INJECTION SUBCUTANEOUS at 09:13

## 2018-10-27 RX ADMIN — Medication 10 ML: at 20:34

## 2018-10-27 RX ADMIN — Medication 15 ML: at 09:22

## 2018-10-27 RX ADMIN — Medication 10 ML: at 09:13

## 2018-10-27 RX ADMIN — IPRATROPIUM BROMIDE AND ALBUTEROL SULFATE 1 AMPULE: .5; 3 SOLUTION RESPIRATORY (INHALATION) at 08:21

## 2018-10-27 RX ADMIN — IPRATROPIUM BROMIDE AND ALBUTEROL SULFATE 1 AMPULE: .5; 3 SOLUTION RESPIRATORY (INHALATION) at 00:28

## 2018-10-27 RX ADMIN — IPRATROPIUM BROMIDE AND ALBUTEROL SULFATE 1 AMPULE: .5; 3 SOLUTION RESPIRATORY (INHALATION) at 12:06

## 2018-10-27 RX ADMIN — MUPIROCIN: 20 OINTMENT TOPICAL at 09:22

## 2018-10-27 RX ADMIN — Medication 15 ML: at 20:34

## 2018-10-27 RX ADMIN — FAMOTIDINE 20 MG: 10 INJECTION, SOLUTION INTRAVENOUS at 20:31

## 2018-10-27 RX ADMIN — IPRATROPIUM BROMIDE AND ALBUTEROL SULFATE 1 AMPULE: .5; 3 SOLUTION RESPIRATORY (INHALATION) at 04:10

## 2018-10-27 RX ADMIN — IPRATROPIUM BROMIDE AND ALBUTEROL SULFATE 1 AMPULE: .5; 3 SOLUTION RESPIRATORY (INHALATION) at 16:28

## 2018-10-27 RX ADMIN — IPRATROPIUM BROMIDE AND ALBUTEROL SULFATE 1 AMPULE: .5; 3 SOLUTION RESPIRATORY (INHALATION) at 19:24

## 2018-10-27 RX ADMIN — METOPROLOL TARTRATE 25 MG: 25 TABLET ORAL at 09:13

## 2018-10-27 RX ADMIN — CARBOXYMETHYLCELLULOSE SODIUM 1 DROP: 10 GEL OPHTHALMIC at 14:14

## 2018-10-27 RX ADMIN — DOCUSATE SODIUM 100 MG: 50 LIQUID ORAL at 09:13

## 2018-10-27 RX ADMIN — FOLIC ACID 1 MG: 1 TABLET ORAL at 09:13

## 2018-10-27 RX ADMIN — PROPOFOL 40 MCG/KG/MIN: 10 INJECTION, EMULSION INTRAVENOUS at 17:36

## 2018-10-27 RX ADMIN — MUPIROCIN: 20 OINTMENT TOPICAL at 20:34

## 2018-10-27 ASSESSMENT — PULMONARY FUNCTION TESTS
PIF_VALUE: 23
PIF_VALUE: 18
PIF_VALUE: 18
PIF_VALUE: 25
PIF_VALUE: 18
PIF_VALUE: 22
PIF_VALUE: 18
PIF_VALUE: 18
PIF_VALUE: 19
PIF_VALUE: 22
PIF_VALUE: 21
PIF_VALUE: 17
PIF_VALUE: 17
PIF_VALUE: 18
PIF_VALUE: 18
PIF_VALUE: 21
PIF_VALUE: 22
PIF_VALUE: 19
PIF_VALUE: 27
PIF_VALUE: 18
PIF_VALUE: 17
PIF_VALUE: 15

## 2018-10-27 ASSESSMENT — PAIN SCALES - GENERAL: PAINLEVEL_OUTOF10: 0

## 2018-10-27 NOTE — PROGRESS NOTES
10/27/18 0028   Vent Information   Vent Type 840   Vent Mode AC/VC   Vt Ordered 450 mL   Rate Set 14 bmp   Peak Flow 55 L/min   Pressure Support 0 cmH20   FiO2  70 %   Sensitivity 3   PEEP/CPAP 8   Humidification Source Heated wire   Humidification Temp 36.9   Vent Patient Data   Peak Inspiratory Pressure 18 cmH2O   Mean Airway Pressure 12 cmH20   Rate Measured 17 br/min   Vt Exhaled 525 mL   Minute Volume 9.05 Liters   I:E Ratio 1:3.10   Cough/Sputum   Sputum How Obtained Suctioned;Tracheal   Cough Productive   Sputum Amount Small   Sputum Color Bright red   Tenacity Thick   Spontaneous Breathing Trial (SBT) RT Doc   Pulse 94   SpO2 98 %   Breath Sounds   Right Upper Lobe Rhonchi   Right Middle Lobe Diminished   Right Lower Lobe Diminished   Left Upper Lobe Rhonchi   Left Lower Lobe Diminished   Additional Respiratory  Assessments   Resp 16   End Tidal CO2 36 (%)   Alarm Settings   High Pressure Alarm 40 cmH2O   Low Minute Volume Alarm 3 L/min   High Respiratory Rate 45 br/min   Low Exhaled Vt  200 mL

## 2018-10-27 NOTE — PROGRESS NOTES
10/26/18 2039   Vent Information   Vent Type 840   Vent Mode AC/VC   Vt Ordered 450 mL   Rate Set 14 bmp   Peak Flow 50 L/min   Pressure Support 0 cmH20   FiO2  70 %   Sensitivity 3   PEEP/CPAP 8   Cuff Pressure (cm H2O) 24 cm H2O   Vent Patient Data   Peak Inspiratory Pressure 19 cmH2O   Mean Airway Pressure 12 cmH20   Rate Measured 21 br/min   Vt Exhaled 472 mL   Minute Volume 10 Liters   I:E Ratio 1:2.00   Plateau Pressure 17 [id number]   Static Compliance 52.4 mL/cmH2O   Dynamic Compliance 42.9 mL/cmH2O   Cough/Sputum   Cough Productive   Sputum Amount Small   Sputum Color Red   Tenacity Thin   Spontaneous Breathing Trial (SBT) RT Doc   Pulse 115   SpO2 98 %   Breath Sounds   Right Upper Lobe Clear   Right Middle Lobe Diminished   Right Lower Lobe Diminished   Left Upper Lobe Clear   Left Lower Lobe Diminished   Additional Respiratory  Assessments   Resp 21   End Tidal CO2 34 (%)   Position Semi-Evangelista's   Alarm Settings   High Pressure Alarm 40 cmH2O   Low Minute Volume Alarm 3 L/min   High Respiratory Rate 45 br/min   Low Exhaled Vt  200 mL

## 2018-10-27 NOTE — PROGRESS NOTES
moist.  Respiratory: On vent via Trach. Limited examination, grossly clear to auscultation bilaterally without wheezes, crackles or rhonchi. Cardiovascular: Regular rate and rhythm, normal S1/S2, no murmurs. No JVD. No significant LE edema. Abdomen: Soft, non-distended, hypoactive bowel sounds. PEG. Neuro: Limited exam, CN grossly intact with no obvious focal deficits. Psychiatric: Awake but does not interact or follow commands. Skin: Skin color, texture, turgor normal.  No rashes or lesions.   Capillary Refill: Brisk,< 3 seconds   Peripheral Pulses: +2 palpable, equal bilaterally         Labs:   Recent Labs      10/25/18   0518  10/26/18   0440  10/27/18   0545   WBC  22.2*  22.8*  16.6*   HGB  14.5  13.3  12.3   HCT  45.2  41.4  39.1   PLT  324  283  222     Recent Labs      10/26/18   0440  10/26/18   2048  10/27/18   0545   NA  154*  151*  155*   K  3.9  4.2  3.3*   CL  109  106  112*   CO2  36*  34*  34*   BUN  58*  52*  49*   CREATININE  0.9  0.7  0.6   CALCIUM  9.3  9.1  9.0     Recent Labs      10/27/18   0545   AST  72*   ALT  68*   BILIDIR  0.6*   BILITOT  1.0   ALKPHOS  71       Assessment/Plan:    Active Hospital Problems    Diagnosis Date Noted    Asthma [J45.909]      Priority: Low    Hypernatremia [E87.0] 10/24/2018    Acute respiratory failure with hypoxemia ([facility] [address] [J96.01] 10/24/2018    Cardiomyopathy ([facility] [address] [I42.9] 10/22/2018    Pulmonary infiltrate [R91.8] 10/22/2018    Atelectasis [J98.11] 10/22/2018    Anoxic brain damage (HCC) [G93.1]     Abnormal echocardiogram [R93.1]     V-tach ([facility] [address] [I47.2] 10/14/2018    Sepsis ([facility] [address] [A41.9] 10/14/2018    Respiratory arrest before cardiac arrest ([facility] [address] [I46.9, R09.2] 10/14/2018    Cardiac arrest with ventricular fibrillation (HCC) [I46.9, I49.01] 10/14/2018    Respiratory failure with hypoxia and hypercapnia (HCC) [J96.91, J96.92]      Acute hypoxemic and hypercapnic respiratory failure   Unclear etiology, most likely caused by cardiac

## 2018-10-27 NOTE — PROGRESS NOTES
INPATIENT PULMONARY CRITICAL CARE PROGRESS NOTE      Reason for visit     intubated, respiratory arrest/V. fib arrest.    SUBJECTIVE:  Patient when seen this morning continues to be critically around on mechanical vent support, patient continues to have modest to moderate respiratory secretions patient continues to require high oxygen supplementation to maintain saturation and was still on 75% oxygen, patient is on IV sedation to maintain patient's ventilator synchrony, patient continues to have fever and the T-max was 100.1°F, patient has sinus tachycardia on the monitor, patient underwent PEG tube placement yesterday; patient was started on enteral feeds yesterday but was kept nothing by mouth after midnight for tracheostomy today;patient had borderline urine output overnight with curative fluid balance of -5.6 L, patient's glycemic control was acceptable;no other ROS , could be obtained because of patient's clinical status           Physical Exam:  Blood pressure 127/86, pulse 109, temperature 100.1 °F (37.8 °C), temperature source Core, resp. rate 22, height 5' 8\" (1.727 m), weight 197 lb 1.5 oz (89.4 kg), SpO2 97 %.'   Constitutional:  No acute distress. On mechanical support   HENT:  ET tube present. No thyromegaly. NGT in place   Eyes:  Conjunctivae are normal. Pupils equal, round, and reactive to light. No scleral icterus. Neck: . No tracheal deviation present. No obvious thyroid mass. Cardiovascular: Sinus tachycardia, normal heart sounds. No right ventricular heave. No lower extremity edema. Pulmonary/Chest: No wheezes. Basilar  rales. Chest wall is not dull to percussion. No accessory muscle usage or stridor. Abdominal: Soft. Bowel sounds present. No distension or hernia. No tenderness. Musculoskeletal: No cyanosis. No clubbing. No obvious joint deformity. Lymphadenopathy: No cervical or supraclavicular adenopathy. Skin: Skin is warm and dry.  No rash or nodules on the exposed mass, acute ischemia, or edema. 2.  Mucosal thickening associated with right maxillary sinus and minimal   fluid signal associated with bilateral mastoid air cells. Clinical   correlation recommended for possibility of mastoiditis. XR CHEST PORTABLE   Final Result   1. Stable cardiomegaly with mild pulmonary edema. CT HEAD WO CONTRAST   Final Result   No acute intracranial abnormality. CT CERVICAL SPINE WO CONTRAST   Final Result   Spondylosis with no definite fracture. XR CHEST PORTABLE   Final Result   Status post placement of left internal jugular central venous catheter,   without gross pneumothorax. Mild pulmonary edema. Left perihilar atelectasis. XR CHEST PORTABLE   Final Result   Cardiomegaly with mild vascular indistinctness, either due to low lung   volumes or mild edema. Subtle retrocardiac opacity remains on the left         XR ABDOMEN (KUB) (SINGLE AP VIEW)   Final Result   Chest: The endotracheal tube tip is 1-2 cm above the rojas. The tube could   be withdrawn 1 cm. Abdomen: The enteric tube is in good position in the stomach which is   distended. XR CHEST PORTABLE   Final Result   Chest: The endotracheal tube tip is 1-2 cm above the rojas. The tube could   be withdrawn 1 cm. Abdomen: The enteric tube is in good position in the stomach which is   distended. XR CHEST PORTABLE    (Results Pending)     Results for Haritha Alarcon (MRN [id number]) as of 10/26/2018 20:25   Ref.  Range 10/24/2018 05:28 10/25/2018 05:18 10/26/2018 04:40   Sodium Latest Ref Range: 136 - 145 mmol/L 150 (H) 154 (H) 154 (H)   Potassium Latest Ref Range: 3.5 - 5.1 mmol/L 3.5 3.4 (L) 3.9   Chloride Latest Ref Range: 99 - 110 mmol/L 101 107 109   CO2 Latest Ref Range: 21 - 32 mmol/L 36 (H) 35 (H) 36 (H)   BUN Latest Ref Range: 7 - 20 mg/dL 61 (H) 60 (H) 58 (H)   Creatinine Latest Ref Range: 0.6 - 1.1 mg/dL 1.1 0.8 0.9   Anion Gap Latest Ref Range: 3 -

## 2018-10-27 NOTE — PROGRESS NOTES
Received bedside report from off going RN. Pt currently trached on vent. Pts skin was assessed. Turned and repositioned. Jaquez in place. Orders verified. Pt unable to follow commands. PEG in place and TF running. Call light in reach, bed in lowest position, will continue to monitor.

## 2018-10-28 ENCOUNTER — APPOINTMENT (OUTPATIENT)
Dept: GENERAL RADIOLOGY | Age: 53
DRG: 005 | End: 2018-10-28
Payer: COMMERCIAL

## 2018-10-28 LAB
ANION GAP SERPL CALCULATED.3IONS-SCNC: 8 MMOL/L (ref 3–16)
BASE EXCESS ARTERIAL: 9.4 MMOL/L (ref -3–3)
BUN BLDV-MCNC: 44 MG/DL (ref 7–20)
CALCIUM SERPL-MCNC: 9 MG/DL (ref 8.3–10.6)
CARBOXYHEMOGLOBIN ARTERIAL: 0.3 % (ref 0–1.5)
CHLORIDE BLD-SCNC: 109 MMOL/L (ref 99–110)
CO2: 37 MMOL/L (ref 21–32)
CREAT SERPL-MCNC: 0.7 MG/DL (ref 0.6–1.1)
FINAL REPORT: NORMAL
GFR AFRICAN AMERICAN: >60
GFR NON-AFRICAN AMERICAN: >60
GLUCOSE BLD-MCNC: 133 MG/DL (ref 70–99)
HCO3 ARTERIAL: 34.2 MMOL/L (ref 21–29)
HCT VFR BLD CALC: 37 % (ref 36–48)
HEMOGLOBIN, ART, EXTENDED: 12.5 G/DL (ref 12–16)
HEMOGLOBIN: 12.1 G/DL (ref 12–16)
MAGNESIUM: 2.8 MG/DL (ref 1.8–2.4)
MCH RBC QN AUTO: 33.1 PG (ref 26–34)
MCHC RBC AUTO-ENTMCNC: 32.6 G/DL (ref 31–36)
MCV RBC AUTO: 101.3 FL (ref 80–100)
METHEMOGLOBIN ARTERIAL: 0.5 %
O2 CONTENT ARTERIAL: 17 ML/DL
O2 SAT, ARTERIAL: 97.7 %
O2 THERAPY: ABNORMAL
PCO2 ARTERIAL: 47.3 MMHG (ref 35–45)
PDW BLD-RTO: 14.5 % (ref 12.4–15.4)
PH ARTERIAL: 7.48 (ref 7.35–7.45)
PLATELET # BLD: 251 K/UL (ref 135–450)
PMV BLD AUTO: 10.3 FL (ref 5–10.5)
PO2 ARTERIAL: 97.7 MMHG (ref 75–108)
POTASSIUM REFLEX MAGNESIUM: 3.3 MMOL/L (ref 3.5–5.1)
PRELIMINARY: NORMAL
RBC # BLD: 3.65 M/UL (ref 4–5.2)
SODIUM BLD-SCNC: 154 MMOL/L (ref 136–145)
TCO2 ARTERIAL: 35.7 MMOL/L
WBC # BLD: 14.9 K/UL (ref 4–11)

## 2018-10-28 PROCEDURE — 94640 AIRWAY INHALATION TREATMENT: CPT

## 2018-10-28 PROCEDURE — 83735 ASSAY OF MAGNESIUM: CPT

## 2018-10-28 PROCEDURE — 94750 HC PULMONARY COMPLIANCE STUDY: CPT

## 2018-10-28 PROCEDURE — 6360000002 HC RX W HCPCS: Performed by: INTERNAL MEDICINE

## 2018-10-28 PROCEDURE — 6370000000 HC RX 637 (ALT 250 FOR IP): Performed by: INTERNAL MEDICINE

## 2018-10-28 PROCEDURE — 85027 COMPLETE CBC AUTOMATED: CPT

## 2018-10-28 PROCEDURE — 94003 VENT MGMT INPAT SUBQ DAY: CPT

## 2018-10-28 PROCEDURE — 82803 BLOOD GASES ANY COMBINATION: CPT

## 2018-10-28 PROCEDURE — 2700000000 HC OXYGEN THERAPY PER DAY

## 2018-10-28 PROCEDURE — 2500000003 HC RX 250 WO HCPCS: Performed by: INTERNAL MEDICINE

## 2018-10-28 PROCEDURE — 2580000003 HC RX 258: Performed by: INTERNAL MEDICINE

## 2018-10-28 PROCEDURE — 2000000000 HC ICU R&B

## 2018-10-28 PROCEDURE — 2580000003 HC RX 258

## 2018-10-28 PROCEDURE — 94761 N-INVAS EAR/PLS OXIMETRY MLT: CPT

## 2018-10-28 PROCEDURE — 71045 X-RAY EXAM CHEST 1 VIEW: CPT

## 2018-10-28 PROCEDURE — 99233 SBSQ HOSP IP/OBS HIGH 50: CPT | Performed by: INTERNAL MEDICINE

## 2018-10-28 PROCEDURE — S0028 INJECTION, FAMOTIDINE, 20 MG: HCPCS | Performed by: INTERNAL MEDICINE

## 2018-10-28 PROCEDURE — 80048 BASIC METABOLIC PNL TOTAL CA: CPT

## 2018-10-28 PROCEDURE — 94770 HC ETCO2 MONITOR DAILY: CPT

## 2018-10-28 RX ORDER — FENTANYL CITRATE 50 UG/ML
25 INJECTION, SOLUTION INTRAMUSCULAR; INTRAVENOUS EVERY 4 HOURS PRN
Status: DISCONTINUED | OUTPATIENT
Start: 2018-10-28 | End: 2018-10-28

## 2018-10-28 RX ORDER — FENTANYL CITRATE 50 UG/ML
25 INJECTION, SOLUTION INTRAMUSCULAR; INTRAVENOUS
Status: DISCONTINUED | OUTPATIENT
Start: 2018-10-28 | End: 2018-10-29

## 2018-10-28 RX ORDER — FENTANYL CITRATE 50 UG/ML
50 INJECTION, SOLUTION INTRAMUSCULAR; INTRAVENOUS ONCE
Status: COMPLETED | OUTPATIENT
Start: 2018-10-28 | End: 2018-10-28

## 2018-10-28 RX ORDER — SODIUM CHLORIDE 9 MG/ML
INJECTION, SOLUTION INTRAVENOUS
Status: COMPLETED
Start: 2018-10-28 | End: 2018-10-28

## 2018-10-28 RX ORDER — LORAZEPAM 2 MG/ML
1 INJECTION INTRAMUSCULAR EVERY 6 HOURS PRN
Status: DISCONTINUED | OUTPATIENT
Start: 2018-10-28 | End: 2018-10-30 | Stop reason: HOSPADM

## 2018-10-28 RX ADMIN — CARBOXYMETHYLCELLULOSE SODIUM 1 DROP: 10 GEL OPHTHALMIC at 09:19

## 2018-10-28 RX ADMIN — METOPROLOL TARTRATE 25 MG: 25 TABLET ORAL at 09:19

## 2018-10-28 RX ADMIN — Medication 10 ML: at 20:30

## 2018-10-28 RX ADMIN — LORAZEPAM 1 MG: 2 INJECTION INTRAMUSCULAR; INTRAVENOUS at 22:00

## 2018-10-28 RX ADMIN — IPRATROPIUM BROMIDE AND ALBUTEROL SULFATE 1 AMPULE: .5; 3 SOLUTION RESPIRATORY (INHALATION) at 11:49

## 2018-10-28 RX ADMIN — ACETAMINOPHEN 650 MG: 325 TABLET ORAL at 22:18

## 2018-10-28 RX ADMIN — FENTANYL CITRATE 25 MCG: 50 INJECTION, SOLUTION INTRAMUSCULAR; INTRAVENOUS at 22:55

## 2018-10-28 RX ADMIN — POTASSIUM CHLORIDE 20 MEQ: 400 INJECTION, SOLUTION INTRAVENOUS at 06:27

## 2018-10-28 RX ADMIN — FOLIC ACID 1 MG: 1 TABLET ORAL at 09:19

## 2018-10-28 RX ADMIN — DOCUSATE SODIUM 100 MG: 50 LIQUID ORAL at 20:29

## 2018-10-28 RX ADMIN — PROPOFOL 25 MCG/KG/MIN: 10 INJECTION, EMULSION INTRAVENOUS at 05:02

## 2018-10-28 RX ADMIN — LORAZEPAM 1 MG: 2 INJECTION INTRAMUSCULAR; INTRAVENOUS at 13:45

## 2018-10-28 RX ADMIN — IPRATROPIUM BROMIDE AND ALBUTEROL SULFATE 1 AMPULE: .5; 3 SOLUTION RESPIRATORY (INHALATION) at 19:44

## 2018-10-28 RX ADMIN — IPRATROPIUM BROMIDE AND ALBUTEROL SULFATE 1 AMPULE: .5; 3 SOLUTION RESPIRATORY (INHALATION) at 00:39

## 2018-10-28 RX ADMIN — DOCUSATE SODIUM 100 MG: 50 LIQUID ORAL at 09:19

## 2018-10-28 RX ADMIN — CARBOXYMETHYLCELLULOSE SODIUM 1 DROP: 10 GEL OPHTHALMIC at 20:29

## 2018-10-28 RX ADMIN — ENOXAPARIN SODIUM 40 MG: 40 INJECTION SUBCUTANEOUS at 09:19

## 2018-10-28 RX ADMIN — PROPOFOL 35 MCG/KG/MIN: 10 INJECTION, EMULSION INTRAVENOUS at 00:07

## 2018-10-28 RX ADMIN — IPRATROPIUM BROMIDE AND ALBUTEROL SULFATE 1 AMPULE: .5; 3 SOLUTION RESPIRATORY (INHALATION) at 04:05

## 2018-10-28 RX ADMIN — POTASSIUM CHLORIDE 20 MEQ: 400 INJECTION, SOLUTION INTRAVENOUS at 04:57

## 2018-10-28 RX ADMIN — ACETAMINOPHEN 650 MG: 650 SUPPOSITORY RECTAL at 14:04

## 2018-10-28 RX ADMIN — FENTANYL CITRATE 25 MCG: 50 INJECTION, SOLUTION INTRAMUSCULAR; INTRAVENOUS at 14:02

## 2018-10-28 RX ADMIN — Medication 15 ML: at 09:19

## 2018-10-28 RX ADMIN — Medication 10 ML: at 09:19

## 2018-10-28 RX ADMIN — IPRATROPIUM BROMIDE AND ALBUTEROL SULFATE 1 AMPULE: .5; 3 SOLUTION RESPIRATORY (INHALATION) at 15:42

## 2018-10-28 RX ADMIN — SODIUM CHLORIDE 250 ML: 9 INJECTION, SOLUTION INTRAVENOUS at 05:04

## 2018-10-28 RX ADMIN — METOPROLOL TARTRATE 25 MG: 25 TABLET ORAL at 20:29

## 2018-10-28 RX ADMIN — Medication 15 ML: at 20:30

## 2018-10-28 RX ADMIN — IPRATROPIUM BROMIDE AND ALBUTEROL SULFATE 1 AMPULE: .5; 3 SOLUTION RESPIRATORY (INHALATION) at 23:58

## 2018-10-28 RX ADMIN — FENTANYL CITRATE 25 MCG: 50 INJECTION, SOLUTION INTRAMUSCULAR; INTRAVENOUS at 20:17

## 2018-10-28 RX ADMIN — FAMOTIDINE 20 MG: 10 INJECTION, SOLUTION INTRAVENOUS at 20:30

## 2018-10-28 RX ADMIN — FAMOTIDINE 20 MG: 10 INJECTION, SOLUTION INTRAVENOUS at 09:19

## 2018-10-28 RX ADMIN — IPRATROPIUM BROMIDE AND ALBUTEROL SULFATE 1 AMPULE: .5; 3 SOLUTION RESPIRATORY (INHALATION) at 07:59

## 2018-10-28 RX ADMIN — CARBOXYMETHYLCELLULOSE SODIUM 1 DROP: 10 GEL OPHTHALMIC at 14:04

## 2018-10-28 RX ADMIN — FENTANYL CITRATE 50 MCG: 50 INJECTION, SOLUTION INTRAMUSCULAR; INTRAVENOUS at 22:39

## 2018-10-28 RX ADMIN — ACETAMINOPHEN 650 MG: 650 SUPPOSITORY RECTAL at 18:06

## 2018-10-28 ASSESSMENT — PULMONARY FUNCTION TESTS
PIF_VALUE: 20
PIF_VALUE: 18
PIF_VALUE: 20
PIF_VALUE: 18
PIF_VALUE: 19
PIF_VALUE: 16
PIF_VALUE: 20
PIF_VALUE: 18
PIF_VALUE: 19
PIF_VALUE: 20
PIF_VALUE: 16
PIF_VALUE: 18
PIF_VALUE: 17
PIF_VALUE: 16
PIF_VALUE: 17
PIF_VALUE: 20
PIF_VALUE: 18
PIF_VALUE: 23
PIF_VALUE: 19
PIF_VALUE: 20
PIF_VALUE: 27
PIF_VALUE: 18
PIF_VALUE: 16
PIF_VALUE: 18
PIF_VALUE: 17
PIF_VALUE: 18
PIF_VALUE: 22
PIF_VALUE: 18
PIF_VALUE: 18
PIF_VALUE: 19
PIF_VALUE: 19

## 2018-10-28 ASSESSMENT — PAIN SCALES - GENERAL
PAINLEVEL_OUTOF10: 4
PAINLEVEL_OUTOF10: 3
PAINLEVEL_OUTOF10: 4
PAINLEVEL_OUTOF10: 0
PAINLEVEL_OUTOF10: 0
PAINLEVEL_OUTOF10: 3
PAINLEVEL_OUTOF10: 0
PAINLEVEL_OUTOF10: 0

## 2018-10-28 NOTE — PROGRESS NOTES
INPATIENT PULMONARY CRITICAL CARE PROGRESS NOTE      Reason for visit     intubated, respiratory arrest/V. fib arrest.    SUBJECTIVE:  Patient when seen this morning continues  on mechanical vent support, patient underwent therapeutic bronchoscopy and large amounts of mucus plugs lavaged out  , patient is on IV sedation to maintain patient's ventilator synchrony, patient continues to have fever and the T-max was 102.2°F, patient has sinus tachycardia on the monitor, patient has PEG tube  In place and has been tolerating enteral feeds ;patient had improved urine output overnight with cumulative fluid balance of -4.4 L, patient's glycemic control was acceptable;no other ROS , could be obtained because of patient's clinical status           Physical Exam:  Blood pressure 131/76, pulse 120, temperature 102.2 °F (39 °C), temperature source Core, resp. rate 20, height 5' 8\" (1.727 m), weight 195 lb 12.3 oz (88.8 kg), SpO2 94 %.'   Constitutional:  No acute distress. On mechanical support   HENT: . Tracheostomy in place   Eyes:  Conjunctivae are normal. Pupils equal, round, and reactive to light. No scleral icterus. Neck: . Tracheostomy in place . No obvious thyroid mass. Cardiovascular: Sinus tachycardia, normal heart sounds. No right ventricular heave. No lower extremity edema. Pulmonary/Chest: No wheezes. Decreased  Basilar  rales. Chest wall is not dull to percussion. No accessory muscle usage or stridor. Decreased BSI   Abdominal: Soft. Bowel sounds present. No distension or hernia. No tenderness. Musculoskeletal: No cyanosis. No clubbing. No obvious joint deformity. Lymphadenopathy: No cervical or supraclavicular adenopathy. Skin: Skin is warm and dry. No rash or nodules on the exposed extremities.   Neurologic: sedated        Results:  CBC:   Recent Labs      10/26/18   0440  10/27/18   0545  10/28/18   0400   WBC  22.8*  16.6*  14.9*   HGB  13.3  12.3  12.1   HCT  41.4  39.1  37.0   MCV  100.7* (MRN [id number]) as of 10/28/2018 19:12   Ref. Range 10/24/2018 12:11 10/27/2018 14:25   RESPIRATORY CULTURE Unknown Rpt Rpt   Gram Stain Result Unknown 2+ WBC's (Polymor. .. 1+ WBC's (Polymor. .. CULTURE, RESPIRATORY Unknown No growth 36-48 h. .. Further report to. .. Fungus Stain Unknown No Fungal element. .. FUNGUS CULTURE Unknown Rpt      SINGLE XRAY VIEW OF THE CHEST       10/28/2018 4:53 am       COMPARISON:   10/27/2018       HISTORY:   ORDERING SYSTEM PROVIDED HISTORY: resp failure   TECHNOLOGIST PROVIDED HISTORY:   Reason for exam:->resp failure   Ordering Physician Provided Reason for Exam: resp failure       FINDINGS:   Monitoring leads project over the chest.  The right apex was partially   excluded.  Right arm PICC and tracheostomy tube are again seen. Debbie Jury is   increasing airspace disease at both lung bases.  Cardiomegaly is unchanged. There is no discernible pneumothorax.             Assessment:  Active Problems:    Asthma    V-tach (HCC)    Sepsis ([facility]    Respiratory arrest before cardiac arrest [facility])    Cardiac arrest with ventricular fibrillation ([facility])    Respiratory failure with hypoxia and hypercapnia ([facility])    Abnormal echocardiogram    Anoxic brain damage (HCC)    Cardiomyopathy (HCC)    Pulmonary infiltrate    Atelectasis    Hypernatremia    Acute respiratory failure with hypoxemia ([facility])  Resolved Problems:    * No resolved hospital problems.  *          Plan:   · Ventilatory support to keep saturation between 90-94%  · Ventilatory waveforms and settings reviewed  · Ventilator changes made-patient put on CPAP with PSV again in the daytime and VC mode at night  · IV sedation infusion being d/louie and IV fentanyl and ativan on PRN basis ordered   · S/p therapeutic bronchoscopy -culture to be followed   · Pulmonary toilet-  · Meropenem d/louie -will reassess as per clinical status and c/s   · Bronchodilators  · Patient is s/p  PEG placement  · Enteral feeds as per metabolic support beings started  · Trend sodium which is stabilizing  ·  free water to decrease the sodium levels  · Tylenol on PRN basis   · Tracheostomy care   · Enteral Lopressor with parameters  · Bronchodilators   · No need for any steroids for now  · PUD and DVT prophylaxis as per IM      Case discussed with family and ICU team    Will need LTAC            Electronically signed by:  Matthew Berger MD    10/28/2018    7:11 PM.

## 2018-10-29 ENCOUNTER — APPOINTMENT (OUTPATIENT)
Dept: GENERAL RADIOLOGY | Age: 53
DRG: 005 | End: 2018-10-29
Payer: COMMERCIAL

## 2018-10-29 LAB
ANION GAP SERPL CALCULATED.3IONS-SCNC: 7 MMOL/L (ref 3–16)
BASE EXCESS ARTERIAL: 9.3 MMOL/L (ref -3–3)
BUN BLDV-MCNC: 33 MG/DL (ref 7–20)
CALCIUM SERPL-MCNC: 8.8 MG/DL (ref 8.3–10.6)
CARBOXYHEMOGLOBIN ARTERIAL: 0.2 % (ref 0–1.5)
CHLORIDE BLD-SCNC: 109 MMOL/L (ref 99–110)
CO2: 34 MMOL/L (ref 21–32)
CREAT SERPL-MCNC: 0.6 MG/DL (ref 0.6–1.1)
CULTURE, RESPIRATORY: NORMAL
GFR AFRICAN AMERICAN: >60
GFR NON-AFRICAN AMERICAN: >60
GLUCOSE BLD-MCNC: 136 MG/DL (ref 70–99)
GRAM STAIN RESULT: NORMAL
HCO3 ARTERIAL: 33.8 MMOL/L (ref 21–29)
HCT VFR BLD CALC: 32.9 % (ref 36–48)
HEMOGLOBIN, ART, EXTENDED: 11.8 G/DL (ref 12–16)
HEMOGLOBIN: 10.7 G/DL (ref 12–16)
MCH RBC QN AUTO: 32.8 PG (ref 26–34)
MCHC RBC AUTO-ENTMCNC: 32.4 G/DL (ref 31–36)
MCV RBC AUTO: 101.1 FL (ref 80–100)
METHEMOGLOBIN ARTERIAL: 0.2 %
O2 CONTENT ARTERIAL: 16 ML/DL
O2 SAT, ARTERIAL: 96.9 %
O2 THERAPY: ABNORMAL
PCO2 ARTERIAL: 45.7 MMHG (ref 35–45)
PDW BLD-RTO: 14 % (ref 12.4–15.4)
PH ARTERIAL: 7.49 (ref 7.35–7.45)
PLATELET # BLD: 202 K/UL (ref 135–450)
PMV BLD AUTO: 10 FL (ref 5–10.5)
PO2 ARTERIAL: 88.2 MMHG (ref 75–108)
POTASSIUM REFLEX MAGNESIUM: 3.7 MMOL/L (ref 3.5–5.1)
RBC # BLD: 3.25 M/UL (ref 4–5.2)
SODIUM BLD-SCNC: 150 MMOL/L (ref 136–145)
TCO2 ARTERIAL: 35.2 MMOL/L
WBC # BLD: 12.5 K/UL (ref 4–11)

## 2018-10-29 PROCEDURE — 6370000000 HC RX 637 (ALT 250 FOR IP): Performed by: INTERNAL MEDICINE

## 2018-10-29 PROCEDURE — 80048 BASIC METABOLIC PNL TOTAL CA: CPT

## 2018-10-29 PROCEDURE — 6360000002 HC RX W HCPCS: Performed by: INTERNAL MEDICINE

## 2018-10-29 PROCEDURE — 2500000003 HC RX 250 WO HCPCS: Performed by: INTERNAL MEDICINE

## 2018-10-29 PROCEDURE — 94770 HC ETCO2 MONITOR DAILY: CPT

## 2018-10-29 PROCEDURE — 99291 CRITICAL CARE FIRST HOUR: CPT | Performed by: INTERNAL MEDICINE

## 2018-10-29 PROCEDURE — S0028 INJECTION, FAMOTIDINE, 20 MG: HCPCS | Performed by: INTERNAL MEDICINE

## 2018-10-29 PROCEDURE — 2580000003 HC RX 258: Performed by: INTERNAL MEDICINE

## 2018-10-29 PROCEDURE — 94003 VENT MGMT INPAT SUBQ DAY: CPT

## 2018-10-29 PROCEDURE — 71045 X-RAY EXAM CHEST 1 VIEW: CPT

## 2018-10-29 PROCEDURE — 2700000000 HC OXYGEN THERAPY PER DAY

## 2018-10-29 PROCEDURE — 2000000000 HC ICU R&B

## 2018-10-29 PROCEDURE — 94761 N-INVAS EAR/PLS OXIMETRY MLT: CPT

## 2018-10-29 PROCEDURE — 82803 BLOOD GASES ANY COMBINATION: CPT

## 2018-10-29 PROCEDURE — 94640 AIRWAY INHALATION TREATMENT: CPT

## 2018-10-29 PROCEDURE — 85027 COMPLETE CBC AUTOMATED: CPT

## 2018-10-29 PROCEDURE — 94750 HC PULMONARY COMPLIANCE STUDY: CPT

## 2018-10-29 RX ORDER — DEXTROAMPHETAMINE SACCHARATE, AMPHETAMINE ASPARTATE, DEXTROAMPHETAMINE SULFATE AND AMPHETAMINE SULFATE 2.5; 2.5; 2.5; 2.5 MG/1; MG/1; MG/1; MG/1
10 TABLET ORAL 2 TIMES DAILY
Status: ON HOLD | COMMUNITY
End: 2018-10-30 | Stop reason: HOSPADM

## 2018-10-29 RX ORDER — OXYCODONE HCL 5 MG/5 ML
2.5 SOLUTION, ORAL ORAL EVERY 8 HOURS SCHEDULED
Status: DISCONTINUED | OUTPATIENT
Start: 2018-10-29 | End: 2018-10-30 | Stop reason: HOSPADM

## 2018-10-29 RX ORDER — AMLODIPINE BESYLATE 10 MG/1
10 TABLET ORAL DAILY
Status: ON HOLD | COMMUNITY
End: 2018-10-30 | Stop reason: HOSPADM

## 2018-10-29 RX ORDER — IPRATROPIUM BROMIDE AND ALBUTEROL SULFATE 2.5; .5 MG/3ML; MG/3ML
1 SOLUTION RESPIRATORY (INHALATION) EVERY 6 HOURS PRN
Status: DISCONTINUED | OUTPATIENT
Start: 2018-10-29 | End: 2018-10-29

## 2018-10-29 RX ORDER — FOLIC ACID 1 MG/1
1 TABLET ORAL DAILY
Status: DISCONTINUED | OUTPATIENT
Start: 2018-10-30 | End: 2018-10-30 | Stop reason: HOSPADM

## 2018-10-29 RX ORDER — OXYCODONE HCL 5 MG/5 ML
5 SOLUTION, ORAL ORAL EVERY 8 HOURS PRN
Status: DISCONTINUED | OUTPATIENT
Start: 2018-10-29 | End: 2018-10-29

## 2018-10-29 RX ORDER — OXYCODONE HCL 5 MG/5 ML
2.5 SOLUTION, ORAL ORAL EVERY 8 HOURS PRN
Status: DISCONTINUED | OUTPATIENT
Start: 2018-10-29 | End: 2018-10-29

## 2018-10-29 RX ORDER — FAMOTIDINE 20 MG/1
20 TABLET, FILM COATED ORAL 2 TIMES DAILY
Status: DISCONTINUED | OUTPATIENT
Start: 2018-10-29 | End: 2018-10-30 | Stop reason: HOSPADM

## 2018-10-29 RX ORDER — IRBESARTAN 300 MG/1
300 TABLET ORAL NIGHTLY
Status: ON HOLD | COMMUNITY
End: 2018-10-30 | Stop reason: HOSPADM

## 2018-10-29 RX ORDER — PAROXETINE 30 MG/1
60 TABLET, FILM COATED ORAL EVERY MORNING
Status: ON HOLD | COMMUNITY
End: 2018-10-30 | Stop reason: HOSPADM

## 2018-10-29 RX ORDER — FENTANYL CITRATE 50 UG/ML
25 INJECTION, SOLUTION INTRAMUSCULAR; INTRAVENOUS
Status: DISCONTINUED | OUTPATIENT
Start: 2018-10-29 | End: 2018-10-30 | Stop reason: HOSPADM

## 2018-10-29 RX ORDER — IPRATROPIUM BROMIDE AND ALBUTEROL SULFATE 2.5; .5 MG/3ML; MG/3ML
1 SOLUTION RESPIRATORY (INHALATION) EVERY 6 HOURS
Status: DISCONTINUED | OUTPATIENT
Start: 2018-10-29 | End: 2018-10-30 | Stop reason: HOSPADM

## 2018-10-29 RX ORDER — ACETAMINOPHEN 160 MG/5ML
650 SOLUTION ORAL EVERY 4 HOURS PRN
Status: DISCONTINUED | OUTPATIENT
Start: 2018-10-29 | End: 2018-10-30 | Stop reason: HOSPADM

## 2018-10-29 RX ADMIN — IPRATROPIUM BROMIDE AND ALBUTEROL SULFATE 1 AMPULE: .5; 3 SOLUTION RESPIRATORY (INHALATION) at 20:00

## 2018-10-29 RX ADMIN — LORAZEPAM 1 MG: 2 INJECTION INTRAMUSCULAR; INTRAVENOUS at 20:15

## 2018-10-29 RX ADMIN — ENOXAPARIN SODIUM 40 MG: 40 INJECTION SUBCUTANEOUS at 08:24

## 2018-10-29 RX ADMIN — FENTANYL CITRATE 25 MCG: 50 INJECTION, SOLUTION INTRAMUSCULAR; INTRAVENOUS at 06:23

## 2018-10-29 RX ADMIN — Medication 10 ML: at 20:16

## 2018-10-29 RX ADMIN — Medication 2.5 MG: at 13:37

## 2018-10-29 RX ADMIN — Medication 400 MG: at 01:49

## 2018-10-29 RX ADMIN — FAMOTIDINE 20 MG: 10 INJECTION, SOLUTION INTRAVENOUS at 08:24

## 2018-10-29 RX ADMIN — FAMOTIDINE 20 MG: 20 TABLET ORAL at 20:16

## 2018-10-29 RX ADMIN — FENTANYL CITRATE 25 MCG: 50 INJECTION, SOLUTION INTRAMUSCULAR; INTRAVENOUS at 12:23

## 2018-10-29 RX ADMIN — ACETAMINOPHEN 650 MG: 325 TABLET ORAL at 08:30

## 2018-10-29 RX ADMIN — CARBOXYMETHYLCELLULOSE SODIUM 1 DROP: 10 GEL OPHTHALMIC at 08:24

## 2018-10-29 RX ADMIN — FENTANYL CITRATE 25 MCG: 50 INJECTION, SOLUTION INTRAMUSCULAR; INTRAVENOUS at 18:36

## 2018-10-29 RX ADMIN — METOPROLOL TARTRATE 5 MG: 5 INJECTION, SOLUTION INTRAVENOUS at 13:09

## 2018-10-29 RX ADMIN — IPRATROPIUM BROMIDE AND ALBUTEROL SULFATE 1 AMPULE: .5; 3 SOLUTION RESPIRATORY (INHALATION) at 03:58

## 2018-10-29 RX ADMIN — FENTANYL CITRATE 25 MCG: 50 INJECTION, SOLUTION INTRAMUSCULAR; INTRAVENOUS at 20:36

## 2018-10-29 RX ADMIN — Medication 15 ML: at 08:32

## 2018-10-29 RX ADMIN — LORAZEPAM 1 MG: 2 INJECTION INTRAMUSCULAR; INTRAVENOUS at 08:43

## 2018-10-29 RX ADMIN — Medication 2.5 MG: at 22:44

## 2018-10-29 RX ADMIN — FENTANYL CITRATE 25 MCG: 50 INJECTION, SOLUTION INTRAMUSCULAR; INTRAVENOUS at 03:00

## 2018-10-29 RX ADMIN — FOLIC ACID 1 MG: 1 TABLET ORAL at 08:24

## 2018-10-29 RX ADMIN — Medication 10 ML: at 08:24

## 2018-10-29 RX ADMIN — DOCUSATE SODIUM 100 MG: 50 LIQUID ORAL at 08:24

## 2018-10-29 RX ADMIN — IPRATROPIUM BROMIDE AND ALBUTEROL SULFATE 1 AMPULE: .5; 3 SOLUTION RESPIRATORY (INHALATION) at 08:12

## 2018-10-29 RX ADMIN — METOPROLOL TARTRATE 25 MG: 25 TABLET ORAL at 20:16

## 2018-10-29 RX ADMIN — ACETAMINOPHEN 650 MG: 650 SOLUTION ORAL at 18:54

## 2018-10-29 RX ADMIN — DOCUSATE SODIUM 100 MG: 50 LIQUID ORAL at 22:44

## 2018-10-29 RX ADMIN — Medication 15 ML: at 20:15

## 2018-10-29 RX ADMIN — IPRATROPIUM BROMIDE AND ALBUTEROL SULFATE 1 AMPULE: .5; 3 SOLUTION RESPIRATORY (INHALATION) at 16:11

## 2018-10-29 RX ADMIN — METOPROLOL TARTRATE 25 MG: 25 TABLET ORAL at 08:24

## 2018-10-29 ASSESSMENT — PULMONARY FUNCTION TESTS
PIF_VALUE: 17
PIF_VALUE: 19
PIF_VALUE: 19
PIF_VALUE: 17
PIF_VALUE: 16
PIF_VALUE: 17
PIF_VALUE: 12
PIF_VALUE: 19
PIF_VALUE: 13
PIF_VALUE: 17
PIF_VALUE: 22
PIF_VALUE: 17
PIF_VALUE: 13
PIF_VALUE: 13
PIF_VALUE: 12
PIF_VALUE: 13
PIF_VALUE: 21
PIF_VALUE: 13
PIF_VALUE: 15
PIF_VALUE: 17
PIF_VALUE: 20
PIF_VALUE: 13
PIF_VALUE: 20
PIF_VALUE: 12
PIF_VALUE: 13
PIF_VALUE: 16
PIF_VALUE: 20
PIF_VALUE: 20
PIF_VALUE: 17

## 2018-10-29 ASSESSMENT — PAIN SCALES - GENERAL
PAINLEVEL_OUTOF10: 0
PAINLEVEL_OUTOF10: 4
PAINLEVEL_OUTOF10: 0
PAINLEVEL_OUTOF10: 2
PAINLEVEL_OUTOF10: 0
PAINLEVEL_OUTOF10: 0
PAINLEVEL_OUTOF10: 8
PAINLEVEL_OUTOF10: 0
PAINLEVEL_OUTOF10: 0
PAINLEVEL_OUTOF10: 7
PAINLEVEL_OUTOF10: 7
PAINLEVEL_OUTOF10: 0
PAINLEVEL_OUTOF10: 8
PAINLEVEL_OUTOF10: 7

## 2018-10-29 NOTE — PROGRESS NOTES
Nutrition Assessment    Type and Reason for Visit: Reassess    Nutrition Recommendations:   1.  Recommend continue Jevity 1.5 start at goal rate of 60 ml/hr   2.  Water flush 150 ml every 2 hours to correct over half free water deficit of 3.1L. Monitor Na, IVF and need for adjustments in free water. Consider IVF infusion. 3.  Will monitor TF intake and tolerance, blood sugar trends, bowel habits, pertinent labs, and fluid and electrolyte balances    Malnutrition Assessment:  · Malnutrition Status: Mild Malnutrition  · Context: Acute illness or injury  · Findings of the 6 clinical characteristics of malnutrition (Minimum of 2 out of 6 clinical characteristics is required to make the diagnosis of moderate or severe Protein Calorie Malnutrition based on AND/ASPEN Guidelines):  1. Energy Intake-Less than or equal to 50%, greater than or equal to 5 days    2. Weight Loss-Unable to assess (weight fluctuating up and down),    3. Fat Loss-Unable to assess,    4. Muscle Loss-Unable to assess (at risk for),    5. Fluid Accumulation-Mild fluid accumulation, Extremities  6.  Strength-Not measured    Nutrition Diagnosis:   · Problem: Inadequate energy intake  · Etiology: related to Insufficient energy/nutrient consumption     Signs and symptoms:  as evidenced by NPO status due to medical condition    Nutrition Assessment:  · Subjective Assessment: Follow up: Pt seen on team rounds. S/p trach 10/26. PEG in place. TF at goal, 60 mL/hr. Off propofol. Still on fentanyl. Free water infusing at 150 mL q 2 hrs to correct half water deficit of -3.1L. Hypernatremic with sodium 150 (trending down from 154 mmol/L). +BM 10/29.    · Nutrition-Focused Physical Findings: BM 10/29  · Wound Type:  (laceration on tongue)  · Current Nutrition Therapies:  · Oral Diet Orders: NPO   · Oral Diet intake: NPO  · Oral Nutrition Supplement (ONS) Orders: None  · ONS intake: NPO  · Tube Feeding (TF) Orders:   · Feeding Route:

## 2018-10-29 NOTE — PROGRESS NOTES
Patient received PRN fentanyl and Ativan and patient remains agitated- excessive non-purposeful movement, RR in low 30s, HR 120s. Not due for another PRN. Call to Dr. Newton Corning- order for one time dose of 50mcg fentanyl and to increase PRN fentanyl to q2 instead of q4 hours.     Electronically signed by Adilson Parnell RN on 10/28/2018 at 11:10 PM

## 2018-10-29 NOTE — PROGRESS NOTES
10/29/18 0008   Vent Information   Vent Type 840   Vent Mode AC/VC   Vt Ordered 450 mL   Rate Set 14 bmp   Peak Flow 55 L/min   Pressure Support 0 cmH20   FiO2  40 %   Sensitivity 3   PEEP/CPAP 5   Cuff Pressure (cm H2O) 28 cm H2O   Humidification Source Heated wire   Humidification Temp 36.2   Vent Patient Data   Peak Inspiratory Pressure 17 cmH2O   Mean Airway Pressure 10 cmH20   Rate Measured 26 br/min   Vt Exhaled 520 mL   Minute Volume 14 Liters   I:E Ratio 1:1.8   Spontaneous Breathing Trial (SBT) RT Doc   Pulse 105   SpO2 99 %   Breath Sounds   Right Upper Lobe Rhonchi   Right Middle Lobe Rhonchi;Diminished   Right Lower Lobe Diminished   Left Upper Lobe Rhonchi   Left Lower Lobe Diminished   Additional Respiratory  Assessments   Resp 26   End Tidal CO2 33 (%)   Alarm Settings   High Pressure Alarm 40 cmH2O   Low Minute Volume Alarm 3 L/min   Apnea (secs) 20 secs   High Respiratory Rate 45 br/min   Low Exhaled Vt  200 mL   Patient Observation   Observations ambu bag/mask and spare trachs at bedside   Surgical Airway (trach) Mary Jane Cuffed   Placement Date/Time: 10/26/18 1444   Timeout: Patient;Procedure;Site/Side;Appropriate Equipment;Site prepped with chlorhexidine; Consent Confirmed; Availability of Implant; Correct Position  Placed By: (c) Licensed provider  Surgical Airway Type: Tracheo. .. Site Assessment Clean;Dry; No Bleeding; No drainage   Site Care Dressing applied   Ties Assessment Intact; Not needed;Dry;Clean

## 2018-10-29 NOTE — PROGRESS NOTES
10/29/18 0403   Vent Information   Vent Type 840   Vent Mode AC/VC   Vt Ordered 450 mL   Rate Set 14 bmp   Peak Flow 55 L/min   Pressure Support 0 cmH20   FiO2  40 %   Sensitivity 3   PEEP/CPAP 5   Cuff Pressure (cm H2O) 30 cm H2O   Humidification Source Heated wire   Humidification Temp 36.9   Vent Patient Data   Peak Inspiratory Pressure 20 cmH2O   Mean Airway Pressure 10 cmH20   Rate Measured 27 br/min   Vt Exhaled 557 mL   Minute Volume 14.6 Liters   I:E Ratio 1:1.7   Spontaneous Breathing Trial (SBT) RT Doc   Pulse 99   SpO2 100 %   Breath Sounds   Right Upper Lobe Rhonchi   Right Middle Lobe Rhonchi;Diminished   Right Lower Lobe Diminished   Left Upper Lobe Diminished;Rhonchi   Left Lower Lobe Diminished   Additional Respiratory  Assessments   Resp 22   End Tidal CO2 34 (%)   Position Semi-Evangelista's   Alarm Settings   High Pressure Alarm 40 cmH2O   Low Minute Volume Alarm 3 L/min   High Respiratory Rate 45 br/min   Patient Observation   Observations AMBU BAG/MASK AT BEDSIDE  (SPARE TRACHS AT BEDSIDE)   Surgical Airway (trach) Mary Jane Cuffed   Placement Date/Time: 10/26/18 7949   Timeout: Patient;Procedure;Site/Side;Appropriate Equipment;Site prepped with chlorhexidine; Consent Confirmed; Availability of Implant; Correct Position  Placed By: (c) Licensed provider  Surgical Airway Type: Tracheo. .. Status Secured   Site Assessment Clean;Dry; No Bleeding; No drainage   Site Care Dressing applied   Ties Assessment Clean;Dry; Intact; Secure

## 2018-10-29 NOTE — PROGRESS NOTES
Pulmonary & Critical Care Medicine ICU Progress Note    Smoker, bronchial asthma, cardiorespiratory arrest, sepsis, lactic acidosis/AGMA, hyperglycemia, shock liver. Extubated, back on ventilator 10/24. Status post trach and/27 and PEG 10/24/18. Bronchoscopy 10/24 and 10/27/18, tracheostomy 10/26    Events of Last 24 hours: Afebrile and hemodynamically stable. Propofol and fentanyl turned off 10/28. Has had fever. Remains on meropenem. On CPAP in the daytime, assist control at night. Awaiting placement in LTAC. Invasive Lines:     MV:  10/14/18    Recent Labs      10/28/18   0425  10/29/18   0520   PHART  7.477*  7.487*   EDD9FFM  47.3*  45.7*   PO2ART  97.7  88.2       MV Settings:  Vent Mode: AC/VC (SBT started at this time.) Rate Set: 14 bmp/Vt Ordered: 450 mL/ [email or web address])    IV:      Vitals:  /78   Pulse 104   Temp 100.4 °F (38 °C) (Core)   Resp 26   Ht 5' 8\" (1.727 m)   Wt 195 lb 12.3 oz (88.8 kg)   SpO2 96%   BMI 29.77 kg/m²      Intake/Output Summary (Last 24 hours) at 10/29/18 1001  Last data filed at 10/29/18 0600   Gross per 24 hour   Intake             3257 ml   Output              855 ml   Net             2402 ml       EXAM:  General: No distress. Not interactive, agitated, moving all over. Eyes: PERRL. No sclera icterus. No conjunctival injection. ENT: #8 Shiley cuffed. Neck: Trachea midline. Normal thyroid. No JVD  Resp: No accessory muscle use, mild tachypnea. No crackles. No wheezing. No rhonchi. No dullness on percussion. CV: Regular rate. Regular rhythm. No mumur or rub. No edema. GI: Non-tender. Non-distended. No masses. No organmegaly. Normal bowel sounds. No hernia. Skin: Warm and dry. No nodule on exposed extremities. No rash on exposed extremities. Lymph: No cervical LAD. No supraclavicular LAD. M/S: No cyanosis. No joint deformity. No clubbing. Neuro: Not arousable, PERRL, agitated. Psych: Deferred.      Medications:  Scheduled Meds:   docusate

## 2018-10-29 NOTE — PROGRESS NOTES
4 Eyes Skin Assessment     The patient is being assess for   Shift Handoff    I agree that 2 RN's have performed a thorough Head to Toe Skin Assessment on the patient. ALL assessment sites listed below have been assessed. Areas assessed by both nurses:   [x]   Head, Face, and Ears   [x]   Shoulders, Back, and Chest, Abdomen  [x]   Arms, Elbows, and Hands   [x]   Coccyx, Sacrum, and Ischium  [x]   Legs, Feet, and Heels      **SHARE this note so that the co-signing nurse is able to place an eSignature**    Co-signer eSignature: {Esignature:[id number]}    Does the Patient have Skin Breakdown?   {Blank single:77365::\"No\",\"Yes LDA WOUND CARE was Initiated documentation include the Corry-wound, Wound Assessment, Measurements, Dressing Treatment, Drainage, and Color\",\"}          Luis Antonio Prevention initiated:  {YES/NO:19732}   Wound Care Orders initiated:  {YES/NO:19732}      WOC nurse consulted for Pressure Injury (Stage 3,4, Unstageable, DTI, NWPT, Complex wounds)and New or Established Ostomies:  {YES/NO:19732}      Primary Nurse eSignature: {Esignature:[id number]}

## 2018-10-29 NOTE — PROGRESS NOTES
93%   BMI 29.77 kg/m²   General appearance:  NAD, squirming in bed without purposeful movements. Eyes: Conjunctivae normal, sclera non-icteric. PERRL. HENT: Oropharynx clear, mucous membranes moist.  Respiratory: On vent via Trach. Limited examination, grossly clear to auscultation bilaterally without wheezes, crackles or rhonchi. Cardiovascular: Regular rate and rhythm, normal S1/S2, no murmurs. No JVD. No significant LE edema. Abdomen: Soft, non-distended, hypoactive bowel sounds. PEG. Neuro: Limited exam, CN grossly intact with no obvious focal deficits. Psychiatric: Sedated on vent  Skin: Skin color, texture, turgor normal.  No rashes or lesions.   Capillary Refill: Brisk,< 3 seconds   Peripheral Pulses: +2 palpable, equal bilaterally         Labs:   Recent Labs      10/27/18   0545  10/28/18   0400  10/29/18   0515   WBC  16.6*  14.9*  12.5*   HGB  12.3  12.1  10.7*   HCT  39.1  37.0  32.9*   PLT  222  251  202     Recent Labs      10/27/18   0545  10/28/18   0400  10/29/18   0515   NA  155*  154*  150*   K  3.3*  3.3*  3.7   CL  112*  109  109   CO2  34*  37*  34*   BUN  49*  44*  33*   CREATININE  0.6  0.7  0.6   CALCIUM  9.0  9.0  8.8     Recent Labs      10/27/18   0545   AST  72*   ALT  68*   BILIDIR  0.6*   BILITOT  1.0   ALKPHOS  71       Assessment/Plan:    Active Hospital Problems    Diagnosis Date Noted    Asthma [J45.909]      Priority: Low    Hypernatremia [E87.0] 10/24/2018    Acute respiratory failure with hypoxemia ([facility] [address] [J96.01] 10/24/2018    Cardiomyopathy ([facility] [I42.9] 10/22/2018    Pulmonary infiltrate [R91.8] 10/22/2018    Atelectasis [J98.11] 10/22/2018    Anoxic brain damage (HCC) [G93.1]     Abnormal echocardiogram [R93.1]     V-tach ([facility] [I47.2] 10/14/2018    Sepsis ([facility] [A41.9] 10/14/2018    [facility] [I46.9, R09.2] 10/14/2018    Cardiac arrest with ventricular fibrillation (HCC) [I46.9, I49.01] 10/14/2018    Respiratory

## 2018-10-29 NOTE — PROGRESS NOTES
SBT started at this time     10/29/18 0819   Vent Information   Vent Type 840   Vent Mode AC/VC  (SBT started at this time.)   Vt Ordered 450 mL   Peak Flow 55 L/min   Pressure Support 5 cmH20   FiO2  40 %   Sensitivity 3   PEEP/CPAP 5   Vent Patient Data   Peak Inspiratory Pressure 13 cmH2O   Mean Airway Pressure 9.69 cmH20   Rate Measured 24 br/min   Vt Exhaled 494 mL   Spontaneous  mL   Minute Volume 12.2 Liters   I:E Ratio 1:2.40   Spontaneous Breathing Trial (SBT) RT Doc   Pulse 104   SpO2 96 %   RSBI Calculated 41.17   Additional Respiratory  Assessments   Resp 26   End Tidal CO2 35 (%)   Alarm Settings   High Pressure Alarm 45 cmH2O   Low Minute Volume Alarm 3 L/min   High Respiratory Rate 40 br/min

## 2018-10-30 ENCOUNTER — APPOINTMENT (OUTPATIENT)
Dept: GENERAL RADIOLOGY | Age: 53
DRG: 005 | End: 2018-10-30
Payer: COMMERCIAL

## 2018-10-30 VITALS
TEMPERATURE: 99 F | WEIGHT: 197.75 LBS | SYSTOLIC BLOOD PRESSURE: 109 MMHG | RESPIRATION RATE: 21 BRPM | HEIGHT: 68 IN | BODY MASS INDEX: 29.97 KG/M2 | DIASTOLIC BLOOD PRESSURE: 62 MMHG | HEART RATE: 89 BPM | OXYGEN SATURATION: 94 %

## 2018-10-30 LAB
ANION GAP SERPL CALCULATED.3IONS-SCNC: 7 MMOL/L (ref 3–16)
BASE EXCESS ARTERIAL: 8.8 MMOL/L (ref -3–3)
BUN BLDV-MCNC: 22 MG/DL (ref 7–20)
CALCIUM SERPL-MCNC: 8.7 MG/DL (ref 8.3–10.6)
CARBOXYHEMOGLOBIN ARTERIAL: 0.1 % (ref 0–1.5)
CHLORIDE BLD-SCNC: 107 MMOL/L (ref 99–110)
CO2: 34 MMOL/L (ref 21–32)
CREAT SERPL-MCNC: <0.5 MG/DL (ref 0.6–1.1)
GFR AFRICAN AMERICAN: >60
GFR NON-AFRICAN AMERICAN: >60
GLUCOSE BLD-MCNC: 151 MG/DL (ref 70–99)
HCO3 ARTERIAL: 33.2 MMOL/L (ref 21–29)
HCT VFR BLD CALC: 33.3 % (ref 36–48)
HEMOGLOBIN, ART, EXTENDED: 12.2 G/DL (ref 12–16)
HEMOGLOBIN: 11 G/DL (ref 12–16)
MCH RBC QN AUTO: 33.3 PG (ref 26–34)
MCHC RBC AUTO-ENTMCNC: 33.1 G/DL (ref 31–36)
MCV RBC AUTO: 100.5 FL (ref 80–100)
METHEMOGLOBIN ARTERIAL: 0.5 %
O2 CONTENT ARTERIAL: 16 ML/DL
O2 SAT, ARTERIAL: 94.9 %
O2 THERAPY: ABNORMAL
PCO2 ARTERIAL: 44.9 MMHG (ref 35–45)
PDW BLD-RTO: 13.7 % (ref 12.4–15.4)
PH ARTERIAL: 7.49 (ref 7.35–7.45)
PLATELET # BLD: 196 K/UL (ref 135–450)
PMV BLD AUTO: 10.5 FL (ref 5–10.5)
PO2 ARTERIAL: 70.4 MMHG (ref 75–108)
POTASSIUM REFLEX MAGNESIUM: 4 MMOL/L (ref 3.5–5.1)
RBC # BLD: 3.31 M/UL (ref 4–5.2)
SODIUM BLD-SCNC: 148 MMOL/L (ref 136–145)
TCO2 ARTERIAL: 34.6 MMOL/L
WBC # BLD: 11.5 K/UL (ref 4–11)

## 2018-10-30 PROCEDURE — 6370000000 HC RX 637 (ALT 250 FOR IP): Performed by: INTERNAL MEDICINE

## 2018-10-30 PROCEDURE — 85027 COMPLETE CBC AUTOMATED: CPT

## 2018-10-30 PROCEDURE — 2700000000 HC OXYGEN THERAPY PER DAY

## 2018-10-30 PROCEDURE — 80048 BASIC METABOLIC PNL TOTAL CA: CPT

## 2018-10-30 PROCEDURE — 71045 X-RAY EXAM CHEST 1 VIEW: CPT

## 2018-10-30 PROCEDURE — 94761 N-INVAS EAR/PLS OXIMETRY MLT: CPT

## 2018-10-30 PROCEDURE — 94750 HC PULMONARY COMPLIANCE STUDY: CPT

## 2018-10-30 PROCEDURE — 6360000002 HC RX W HCPCS: Performed by: INTERNAL MEDICINE

## 2018-10-30 PROCEDURE — 94640 AIRWAY INHALATION TREATMENT: CPT

## 2018-10-30 PROCEDURE — 82803 BLOOD GASES ANY COMBINATION: CPT

## 2018-10-30 PROCEDURE — 94003 VENT MGMT INPAT SUBQ DAY: CPT

## 2018-10-30 PROCEDURE — 2580000003 HC RX 258: Performed by: INTERNAL MEDICINE

## 2018-10-30 PROCEDURE — 94770 HC ETCO2 MONITOR DAILY: CPT

## 2018-10-30 PROCEDURE — 99233 SBSQ HOSP IP/OBS HIGH 50: CPT | Performed by: INTERNAL MEDICINE

## 2018-10-30 RX ORDER — OXYCODONE HCL 5 MG/5 ML
2.5 SOLUTION, ORAL ORAL EVERY 8 HOURS SCHEDULED
Qty: 15 ML | Refills: 0
Start: 2018-10-30 | End: 2018-11-09

## 2018-10-30 RX ORDER — LORAZEPAM 2 MG/ML
1 INJECTION INTRAMUSCULAR EVERY 6 HOURS PRN
Qty: 5 ML | Refills: 0
Start: 2018-10-30 | End: 2018-11-06

## 2018-10-30 RX ORDER — IPRATROPIUM BROMIDE AND ALBUTEROL SULFATE 2.5; .5 MG/3ML; MG/3ML
3 SOLUTION RESPIRATORY (INHALATION) EVERY 6 HOURS
Qty: 360 ML | Refills: 0
Start: 2018-10-30

## 2018-10-30 RX ORDER — FAMOTIDINE 20 MG/1
20 TABLET, FILM COATED ORAL 2 TIMES DAILY
Qty: 60 TABLET | Refills: 3 | Status: SHIPPED | OUTPATIENT
Start: 2018-10-30

## 2018-10-30 RX ORDER — FOLIC ACID 1 MG/1
1 TABLET ORAL DAILY
Qty: 30 TABLET | Refills: 3 | Status: SHIPPED | OUTPATIENT
Start: 2018-10-31 | End: 2020-04-07 | Stop reason: ALTCHOICE

## 2018-10-30 RX ORDER — LABETALOL HYDROCHLORIDE 5 MG/ML
20 INJECTION, SOLUTION INTRAVENOUS EVERY 4 HOURS PRN
Qty: 20 ML | Refills: 0
Start: 2018-10-30

## 2018-10-30 RX ADMIN — Medication 10 ML: at 08:30

## 2018-10-30 RX ADMIN — Medication 15 ML: at 08:30

## 2018-10-30 RX ADMIN — LORAZEPAM 1 MG: 2 INJECTION INTRAMUSCULAR; INTRAVENOUS at 03:08

## 2018-10-30 RX ADMIN — Medication 2.5 MG: at 14:13

## 2018-10-30 RX ADMIN — IPRATROPIUM BROMIDE AND ALBUTEROL SULFATE 1 AMPULE: .5; 3 SOLUTION RESPIRATORY (INHALATION) at 08:07

## 2018-10-30 RX ADMIN — LORAZEPAM 1 MG: 2 INJECTION INTRAMUSCULAR; INTRAVENOUS at 12:16

## 2018-10-30 RX ADMIN — FENTANYL CITRATE 25 MCG: 50 INJECTION, SOLUTION INTRAMUSCULAR; INTRAVENOUS at 03:08

## 2018-10-30 RX ADMIN — IPRATROPIUM BROMIDE AND ALBUTEROL SULFATE 1 AMPULE: .5; 3 SOLUTION RESPIRATORY (INHALATION) at 02:00

## 2018-10-30 RX ADMIN — IPRATROPIUM BROMIDE AND ALBUTEROL SULFATE 1 AMPULE: .5; 3 SOLUTION RESPIRATORY (INHALATION) at 14:20

## 2018-10-30 RX ADMIN — DOCUSATE SODIUM 100 MG: 50 LIQUID ORAL at 08:29

## 2018-10-30 RX ADMIN — FENTANYL CITRATE 25 MCG: 50 INJECTION, SOLUTION INTRAMUSCULAR; INTRAVENOUS at 12:33

## 2018-10-30 RX ADMIN — METOPROLOL TARTRATE 25 MG: 25 TABLET ORAL at 08:29

## 2018-10-30 RX ADMIN — ENOXAPARIN SODIUM 40 MG: 40 INJECTION SUBCUTANEOUS at 08:29

## 2018-10-30 RX ADMIN — LORAZEPAM 1 MG: 2 INJECTION INTRAMUSCULAR; INTRAVENOUS at 17:29

## 2018-10-30 RX ADMIN — FAMOTIDINE 20 MG: 20 TABLET ORAL at 08:29

## 2018-10-30 RX ADMIN — FENTANYL CITRATE 25 MCG: 50 INJECTION, SOLUTION INTRAMUSCULAR; INTRAVENOUS at 14:55

## 2018-10-30 RX ADMIN — Medication 2.5 MG: at 05:57

## 2018-10-30 RX ADMIN — FOLIC ACID 1 MG: 1 TABLET ORAL at 08:29

## 2018-10-30 ASSESSMENT — PAIN SCALES - GENERAL
PAINLEVEL_OUTOF10: 4
PAINLEVEL_OUTOF10: 3
PAINLEVEL_OUTOF10: 0
PAINLEVEL_OUTOF10: 7
PAINLEVEL_OUTOF10: 0
PAINLEVEL_OUTOF10: 4
PAINLEVEL_OUTOF10: 7
PAINLEVEL_OUTOF10: 2

## 2018-10-30 ASSESSMENT — PULMONARY FUNCTION TESTS
PIF_VALUE: 26
PIF_VALUE: 21
PIF_VALUE: 13
PIF_VALUE: 28
PIF_VALUE: 24
PIF_VALUE: 12
PIF_VALUE: 23
PIF_VALUE: 19
PIF_VALUE: 28
PIF_VALUE: 14
PIF_VALUE: 24
PIF_VALUE: 9
PIF_VALUE: 21
PIF_VALUE: 12
PIF_VALUE: 21
PIF_VALUE: 21

## 2018-10-30 NOTE — PROGRESS NOTES
10/30/18 0808   Vent Information   $Ventilation $Subsequent Day   Vent Type 840   Vent Mode AC/VC   Vt Ordered 450 mL   Rate Set 14 bmp   Peak Flow 65 L/min   Pressure Support 0 cmH20   FiO2  40 %   Sensitivity 3   PEEP/CPAP 5   Cuff Pressure (cm H2O) 30 cm H2O   Humidification Source Heated wire   Humidification Temp 37.2   Vent Patient Data   Peak Inspiratory Pressure 21 cmH2O   Mean Airway Pressure 7.9 cmH20   Rate Measured 21 br/min   Vt Exhaled 513 mL   Minute Volume 11.1 Liters   I:E Ratio 1:2.40   Plateau Pressure 14 [id number]   Cough/Sputum   Sputum How Obtained Tracheal;Suctioned   Cough Productive   Sputum Amount Moderate   Sputum Color Cloudy;Creamy   Tenacity Thick   Spontaneous Breathing Trial (SBT) RT Doc   Pulse 98   SpO2 92 %   Breath Sounds   Right Upper Lobe Diminished   Right Middle Lobe Diminished   Right Lower Lobe Diminished   Left Upper Lobe Diminished   Left Lower Lobe Diminished   Additional Respiratory  Assessments   Resp 25   End Tidal CO2 35 (%)   Alarm Settings   High Pressure Alarm 40 cmH2O   Low Minute Volume Alarm 3 L/min   High Respiratory Rate 45 br/min   Low Exhaled Vt  300 mL   Patient Observation   Observations 8 amarilys Hinton/xtra trachs @ bedside

## 2018-10-30 NOTE — CARE COORDINATION
CASE MANAGEMENT DISCHARGE SUMMARY      Discharge to: [address] completed: complete  Hospital Exemption Notification (HENS) completed: not needed    [address] ordered/agency: new trach/peg    Transportation: ambulance   Medical Transport/ time: [facility] transport 4pm ([id number])   Ambulance form completed: Yes    Notified: patient family made aware of transport plans. Left message for  Patricia Ask and updated friend at bedside. Facility/Agency: [facility] faxed to [facility]   RN: Vega Crane RN aware    Phone number for report to facility: [phone]    Note: Discharging nurse to complete [facility], reconcile AVS, and place final copy with patient's discharge packet. RN to ensure that written prescriptions for  Level II medications are sent with patient to the facility as per protocol.       Alexandrea Allred RN

## 2018-10-30 NOTE — CARE COORDINATION
CM received consult to see patient as they want to change LTACH facility from [facility] to [facility]. CM met with  at bedside who states that after his daughters spoke to a few medical friends they were recommended for [facility] over [facility]. They are asking if able to switch. Historically in this CM experience this has proven very difficult to impossible. However, CM is having [facility] review patient information to see if able to accept. Spoke to Della singh at [facility] who seems to think that we are able to change location once approval is received. While speaking with [facility] and Patient family writer received Call from Jey at [facility] they were able to get approval.  At this time we are waiting on [facility] to say yes or no to admission then writer will present option of trying to change precert vs going to [facility]. If family choses to go to [facility] then it will likely not happen today as this change could take some time. If unable to change we will have to cancel precert for [facility] and restart one for [facility] which could take up to 4 days. CM continuing to follow.   Kristi Ko RN

## 2018-10-30 NOTE — CARE COORDINATION
CM spoke to  who voiced that he wanted [facility] but was concerned about loosing auth for [employer] by exploring this option therefore, he authorized going to [facility]. CM confirmed with Castillo Lucero with [employer] [facility] that they are still able to accept patient. While speaking to her writer received notification that the daughter is calling the insurance company to try to change authorization herself. MEME spoke to Castillo Lucero with [employer] to see if we are able to obtain reference numbers for authorization. Castillo Lucero contacted her business office who states they are unable to provide this information because it is attached to their NPI and Tax ID numbers. States that for legalities for billing [facility] would have to obtain their own auth. Updated both facilities.   Bruna Saxena RN

## 2018-10-30 NOTE — PROGRESS NOTES
10/30/18 0028   Vent Information   Vent Type 840   Vent Mode AC/VC   Vt Ordered 450 mL   Rate Set 14 bmp   Peak Flow 65 L/min   Pressure Support 0 cmH20   FiO2  40 %   Sensitivity 3   PEEP/CPAP 5   Humidification Source Heated wire   Humidification Temp 39   Vent Patient Data   Peak Inspiratory Pressure 21 cmH2O   Mean Airway Pressure 9.8 cmH20   Rate Measured 18 br/min   Vt Exhaled 503 mL   Minute Volume 10 Liters   I:E Ratio 1:3.4   Cough/Sputum   Sputum How Obtained Tracheal   Cough Productive   Sputum Amount Small   Sputum Color Clear;Cloudy   Tenacity Thin   Breath Sounds   Right Upper Lobe Clear   Right Middle Lobe Diminished   Right Lower Lobe Diminished   Left Upper Lobe Clear   Left Lower Lobe Diminished   Additional Respiratory  Assessments   Position Semi-Evangelista's   Subglottic Suction Done? Yes   Alarm Settings   High Pressure Alarm 40 cmH2O   Low Minute Volume Alarm 3 L/min   Apnea (secs) 20 secs   High Respiratory Rate 45 br/min   Surgical Airway (trach) Shiley Cuffed   Placement Date/Time: 10/26/18 1053   Timeout: Patient;Procedure;Site/Side;Appropriate Equipment;Site prepped with chlorhexidine; Consent Confirmed; Availability of Implant; Correct Position  Placed By: (c) Licensed provider  Surgical Airway Type: Tracheo. ..    Status Secured   Site Assessment Clean;Dry

## 2018-10-30 NOTE — PROGRESS NOTES
Eval Status: she cannot participate    Dispo - ready for LTACH once accepted. Family wants [facility]. Request for [facility] neurology consultation at [facility] will need to be put on the [address].         Zulma Gaona MD

## 2018-10-30 NOTE — CARE COORDINATION
Son approached writer who states that patient  said to move forward with [employer] [facility] admission.   Frankie Aguilar RN

## 2018-10-30 NOTE — PROGRESS NOTES
Pulmonary & Critical Care Medicine ICU Progress Note    Smoker, bronchial asthma, cardiorespiratory arrest, sepsis, lactic acidosis/AGMA, hyperglycemia, shock liver. Extubated, back on ventilator 10/24. Status post trach and/27 and PEG 10/24/18. Bronchoscopy 10/24 and 10/27/18, tracheostomy 10/26    Events of Last 24 hours: Afebrile and hemodynamically stable. Propofol and fentanyl turned off 10/28. Has had fever. Remains on meropenem. On CPAP in the daytime, assist control at night. Awaiting placement in LTAC. Spoke to Dr. Gail Miranda about wanting to go to [facility].  Low-grade fever, T-max 100°F.  Did not undergo trach collar trial yesterday. Invasive Lines:     MV:  10/14/18    Recent Labs      10/28/18   0425  10/29/18   0520   PHART  7.477*  7.487*   [id number]  47.3*  45.7*   PO2ART  97.7  88.2       MV Settings:  Vent Mode: AC/VC Rate Set: 14 bmp/Vt Ordered: 450 mL/ [email or web address])    IV:      Vitals:  /67   Pulse 95   Temp 100 °F (37.8 °C) (Core)   Resp 25   Ht 5' 8\" (1.727 m)   Wt 197 lb 12 oz (89.7 kg)   SpO2 98%   BMI 30.07 kg/m²      Intake/Output Summary (Last 24 hours) at 10/30/18 0937  Last data filed at 10/30/18 0844   Gross per 24 hour   Intake             3267 ml   Output             1085 ml   Net             2182 ml       EXAM:  General: No distress. Not interactive, agitated, moving all over. Eyes: PERRL. No sclera icterus. No conjunctival injection. ENT: # 8 Shiley cuffed. Neck:  No JVD  Resp: No accessory muscle use, mild tachypnea. No crackles. No wheezing. No rhonchi. No dullness on percussion. CV: Regular rate. Regular rhythm. No mumur or rub. No edema. GI: Deferred. Skin: Warm and dry. No nodule on exposed extremities. No rash on exposed extremities. Lymph: Deferred. M/S: No cyanosis. No joint deformity. No clubbing. Neuro: Not arousable, PERRL, agitated. Psych: Deferred.      Medications:  Scheduled Meds:   docusate  100 mg Per G Tube BID   

## 2018-10-30 NOTE — PROGRESS NOTES
10/29/18 2055   Vent Information   Vent Type 840   Vent Mode AC/VC   Vt Ordered 450 mL   Rate Set 14 bmp   Peak Flow 65 L/min   Pressure Support 0 cmH20   FiO2  40 %   Sensitivity 3   PEEP/CPAP 5   Humidification Source Heated wire   Humidification Temp 37.5   Circuit Condensation Drained   Vent Patient Data   Peak Inspiratory Pressure 20 cmH2O   Mean Airway Pressure 9.5 cmH20   Rate Measured 18 br/min   Vt Exhaled 572 mL   Minute Volume 9.91 Liters   I:E Ratio 1:3.20   Plateau Pressure 15 GFM52   Static Compliance 57.2 mL/cmH2O   Dynamic Compliance 38.13 mL/cmH2O   Cough/Sputum   Sputum How Obtained Tracheal;Suctioned   Cough Productive;Strong;Moist;Congested   Sputum Amount Moderate   Sputum Color Clear;Cloudy   Tenacity Thin   Spontaneous Breathing Trial (SBT) RT Doc   Pulse 98   SpO2 93 %   Breath Sounds   Right Upper Lobe Clear   Right Middle Lobe Diminished   Right Lower Lobe Diminished   Left Upper Lobe Clear   Left Lower Lobe Diminished   Additional Respiratory  Assessments   End Tidal CO2 36 (%)   Position Semi-Evangelista's   Subglottic Suction Done? Yes   Alarm Settings   High Pressure Alarm 45 cmH2O   Low Minute Volume Alarm 3 L/min   Apnea (secs) 20 secs   High Respiratory Rate 40 br/min   Surgical Airway (trach) Shiley Cuffed   Placement Date/Time: 10/26/18 6621   Timeout: Patient;Procedure;Site/Side;Appropriate Equipment;Site prepped with chlorhexidine; Consent Confirmed; Availability of Implant; Correct Position  Placed By: (c) Licensed provider  Surgical Airway Type: Tracheo. ..    Status Secured   Site Assessment Clean;Dry

## 2018-11-05 LAB
FINAL REPORT: NORMAL
PRELIMINARY: NORMAL

## 2018-11-23 NOTE — ED PROVIDER NOTES
Patient presents to the emergency room in cardiac arrest. ROSC after shocked once by EMS. For further details, please see scanned note as this was done during epic down time.      Carlo He DO  11/23/18 0007

## 2018-11-26 LAB
FUNGUS (MYCOLOGY) CULTURE: NORMAL
FUNGUS STAIN: NORMAL

## 2020-03-19 ENCOUNTER — HOSPITAL ENCOUNTER (OUTPATIENT)
Age: 55
Setting detail: SPECIMEN
Discharge: HOME | End: 2020-03-19
Payer: MEDICAID

## 2020-04-07 ENCOUNTER — HOSPITAL ENCOUNTER (OUTPATIENT)
Age: 55
Setting detail: OBSERVATION
Discharge: HOME HEALTH | End: 2020-04-10
Attending: EMERGENCY MEDICINE | Admitting: PEDIATRICS
Payer: MEDICAID

## 2020-04-07 PROBLEM — K94.23 GASTROSTOMY TUBE DYSFUNCTION (HCC): Status: ACTIVE | Noted: 2020-04-07

## 2020-04-07 LAB
A/G RATIO: 1.1 (ref 1.1–2.2)
ALBUMIN SERPL-MCNC: 3.9 G/DL (ref 3.4–5)
ALP BLD-CCNC: 97 U/L (ref 40–129)
ALT SERPL-CCNC: 51 U/L (ref 10–40)
ANION GAP SERPL CALCULATED.3IONS-SCNC: 11 MMOL/L (ref 3–16)
AST SERPL-CCNC: 36 U/L (ref 15–37)
BASOPHILS ABSOLUTE: 0 K/UL (ref 0–0.2)
BASOPHILS RELATIVE PERCENT: 0.6 %
BILIRUB SERPL-MCNC: 0.4 MG/DL (ref 0–1)
BUN BLDV-MCNC: 14 MG/DL (ref 7–20)
CALCIUM SERPL-MCNC: 10.3 MG/DL (ref 8.3–10.6)
CHLORIDE BLD-SCNC: 102 MMOL/L (ref 99–110)
CO2: 27 MMOL/L (ref 21–32)
CREAT SERPL-MCNC: <0.5 MG/DL (ref 0.6–1.1)
EOSINOPHILS ABSOLUTE: 0.1 K/UL (ref 0–0.6)
EOSINOPHILS RELATIVE PERCENT: 1.4 %
GFR AFRICAN AMERICAN: >60
GFR NON-AFRICAN AMERICAN: >60
GLOBULIN: 3.6 G/DL
GLUCOSE BLD-MCNC: 101 MG/DL (ref 70–99)
HCT VFR BLD CALC: 42.3 % (ref 36–48)
HEMOGLOBIN: 14.3 G/DL (ref 12–16)
LYMPHOCYTES ABSOLUTE: 2.5 K/UL (ref 1–5.1)
LYMPHOCYTES RELATIVE PERCENT: 28.4 %
MCH RBC QN AUTO: 31 PG (ref 26–34)
MCHC RBC AUTO-ENTMCNC: 33.7 G/DL (ref 31–36)
MCV RBC AUTO: 91.9 FL (ref 80–100)
MONOCYTES ABSOLUTE: 0.5 K/UL (ref 0–1.3)
MONOCYTES RELATIVE PERCENT: 6.1 %
NEUTROPHILS ABSOLUTE: 5.5 K/UL (ref 1.7–7.7)
NEUTROPHILS RELATIVE PERCENT: 63.5 %
PDW BLD-RTO: 14 % (ref 12.4–15.4)
PLATELET # BLD: 300 K/UL (ref 135–450)
PMV BLD AUTO: 9.3 FL (ref 5–10.5)
POTASSIUM REFLEX MAGNESIUM: 4.3 MMOL/L (ref 3.5–5.1)
RBC # BLD: 4.61 M/UL (ref 4–5.2)
SODIUM BLD-SCNC: 140 MMOL/L (ref 136–145)
TOTAL PROTEIN: 7.5 G/DL (ref 6.4–8.2)
WBC # BLD: 8.7 K/UL (ref 4–11)

## 2020-04-07 PROCEDURE — 6360000002 HC RX W HCPCS: Performed by: PEDIATRICS

## 2020-04-07 PROCEDURE — 85025 COMPLETE CBC W/AUTO DIFF WBC: CPT

## 2020-04-07 PROCEDURE — 99284 EMERGENCY DEPT VISIT MOD MDM: CPT

## 2020-04-07 PROCEDURE — 80053 COMPREHEN METABOLIC PANEL: CPT

## 2020-04-07 PROCEDURE — 96374 THER/PROPH/DIAG INJ IV PUSH: CPT

## 2020-04-07 PROCEDURE — 2580000003 HC RX 258: Performed by: INTERNAL MEDICINE

## 2020-04-07 PROCEDURE — 96376 TX/PRO/DX INJ SAME DRUG ADON: CPT

## 2020-04-07 PROCEDURE — G0378 HOSPITAL OBSERVATION PER HR: HCPCS

## 2020-04-07 PROCEDURE — 2580000003 HC RX 258: Performed by: EMERGENCY MEDICINE

## 2020-04-07 RX ORDER — ACETAMINOPHEN 325 MG/1
650 TABLET ORAL EVERY 6 HOURS PRN
Status: DISCONTINUED | OUTPATIENT
Start: 2020-04-07 | End: 2020-04-10 | Stop reason: HOSPADM

## 2020-04-07 RX ORDER — IPRATROPIUM BROMIDE AND ALBUTEROL SULFATE 2.5; .5 MG/3ML; MG/3ML
3 SOLUTION RESPIRATORY (INHALATION) EVERY 6 HOURS
Status: DISCONTINUED | OUTPATIENT
Start: 2020-04-07 | End: 2020-04-07

## 2020-04-07 RX ORDER — SODIUM CHLORIDE 0.9 % (FLUSH) 0.9 %
10 SYRINGE (ML) INJECTION EVERY 12 HOURS SCHEDULED
Status: DISCONTINUED | OUTPATIENT
Start: 2020-04-07 | End: 2020-04-10 | Stop reason: HOSPADM

## 2020-04-07 RX ORDER — CLONAZEPAM 1 MG/1
2 TABLET ORAL 3 TIMES DAILY PRN
Status: DISCONTINUED | OUTPATIENT
Start: 2020-04-07 | End: 2020-04-10 | Stop reason: HOSPADM

## 2020-04-07 RX ORDER — PAROXETINE HYDROCHLORIDE 20 MG/1
20 TABLET, FILM COATED ORAL 2 TIMES DAILY
Status: DISCONTINUED | OUTPATIENT
Start: 2020-04-07 | End: 2020-04-07

## 2020-04-07 RX ORDER — SODIUM CHLORIDE 0.9 % (FLUSH) 0.9 %
10 SYRINGE (ML) INJECTION PRN
Status: DISCONTINUED | OUTPATIENT
Start: 2020-04-07 | End: 2020-04-10 | Stop reason: HOSPADM

## 2020-04-07 RX ORDER — HYDRALAZINE HYDROCHLORIDE 20 MG/ML
10 INJECTION INTRAMUSCULAR; INTRAVENOUS EVERY 6 HOURS PRN
Status: DISCONTINUED | OUTPATIENT
Start: 2020-04-07 | End: 2020-04-10 | Stop reason: HOSPADM

## 2020-04-07 RX ORDER — ATORVASTATIN CALCIUM 10 MG/1
20 TABLET, FILM COATED ORAL DAILY
Status: DISCONTINUED | OUTPATIENT
Start: 2020-04-07 | End: 2020-04-07

## 2020-04-07 RX ORDER — ZOLPIDEM TARTRATE 5 MG/1
5 TABLET ORAL NIGHTLY PRN
Status: DISCONTINUED | OUTPATIENT
Start: 2020-04-07 | End: 2020-04-10 | Stop reason: HOSPADM

## 2020-04-07 RX ORDER — BACLOFEN 10 MG/1
10 TABLET ORAL 2 TIMES DAILY
COMMUNITY

## 2020-04-07 RX ORDER — ATENOLOL 25 MG/1
25 TABLET ORAL DAILY
COMMUNITY

## 2020-04-07 RX ORDER — LORAZEPAM 2 MG/ML
1 INJECTION INTRAMUSCULAR 3 TIMES DAILY PRN
Status: DISCONTINUED | OUTPATIENT
Start: 2020-04-07 | End: 2020-04-10 | Stop reason: HOSPADM

## 2020-04-07 RX ORDER — ZOLPIDEM TARTRATE 5 MG/1
5 TABLET ORAL NIGHTLY PRN
COMMUNITY

## 2020-04-07 RX ORDER — IPRATROPIUM BROMIDE AND ALBUTEROL SULFATE 2.5; .5 MG/3ML; MG/3ML
3 SOLUTION RESPIRATORY (INHALATION) EVERY 4 HOURS PRN
Status: DISCONTINUED | OUTPATIENT
Start: 2020-04-07 | End: 2020-04-10 | Stop reason: HOSPADM

## 2020-04-07 RX ORDER — FAMOTIDINE 20 MG/1
20 TABLET, FILM COATED ORAL 2 TIMES DAILY
Status: DISCONTINUED | OUTPATIENT
Start: 2020-04-07 | End: 2020-04-10 | Stop reason: HOSPADM

## 2020-04-07 RX ORDER — SODIUM CHLORIDE 9 MG/ML
1000 INJECTION, SOLUTION INTRAVENOUS CONTINUOUS
Status: DISCONTINUED | OUTPATIENT
Start: 2020-04-07 | End: 2020-04-07

## 2020-04-07 RX ORDER — CLONAZEPAM 2 MG/1
2 TABLET ORAL 3 TIMES DAILY PRN
COMMUNITY

## 2020-04-07 RX ORDER — IPRATROPIUM BROMIDE AND ALBUTEROL SULFATE 2.5; .5 MG/3ML; MG/3ML
3 SOLUTION RESPIRATORY (INHALATION)
Status: DISCONTINUED | OUTPATIENT
Start: 2020-04-07 | End: 2020-04-07

## 2020-04-07 RX ORDER — ACETAMINOPHEN 650 MG/1
650 SUPPOSITORY RECTAL EVERY 6 HOURS PRN
Status: DISCONTINUED | OUTPATIENT
Start: 2020-04-07 | End: 2020-04-10 | Stop reason: HOSPADM

## 2020-04-07 RX ORDER — ATENOLOL 25 MG/1
25 TABLET ORAL DAILY
Status: DISCONTINUED | OUTPATIENT
Start: 2020-04-07 | End: 2020-04-10 | Stop reason: HOSPADM

## 2020-04-07 RX ORDER — ONDANSETRON 2 MG/ML
4 INJECTION INTRAMUSCULAR; INTRAVENOUS EVERY 6 HOURS PRN
Status: DISCONTINUED | OUTPATIENT
Start: 2020-04-07 | End: 2020-04-10 | Stop reason: HOSPADM

## 2020-04-07 RX ORDER — PAROXETINE HYDROCHLORIDE 20 MG/1
20 TABLET, FILM COATED ORAL 2 TIMES DAILY
Status: DISCONTINUED | OUTPATIENT
Start: 2020-04-07 | End: 2020-04-10 | Stop reason: HOSPADM

## 2020-04-07 RX ORDER — DEXTROSE AND SODIUM CHLORIDE 5; .9 G/100ML; G/100ML
INJECTION, SOLUTION INTRAVENOUS CONTINUOUS
Status: DISCONTINUED | OUTPATIENT
Start: 2020-04-07 | End: 2020-04-09

## 2020-04-07 RX ORDER — LABETALOL HYDROCHLORIDE 5 MG/ML
20 INJECTION, SOLUTION INTRAVENOUS EVERY 4 HOURS PRN
Status: DISCONTINUED | OUTPATIENT
Start: 2020-04-07 | End: 2020-04-09

## 2020-04-07 RX ORDER — BACLOFEN 10 MG/1
10 TABLET ORAL 2 TIMES DAILY
Status: DISCONTINUED | OUTPATIENT
Start: 2020-04-07 | End: 2020-04-10 | Stop reason: HOSPADM

## 2020-04-07 RX ORDER — ATORVASTATIN CALCIUM 10 MG/1
20 TABLET, FILM COATED ORAL DAILY
Status: DISCONTINUED | OUTPATIENT
Start: 2020-04-07 | End: 2020-04-10 | Stop reason: HOSPADM

## 2020-04-07 RX ADMIN — LORAZEPAM 1 MG: 2 INJECTION, SOLUTION INTRAMUSCULAR; INTRAVENOUS at 05:54

## 2020-04-07 RX ADMIN — LORAZEPAM 1 MG: 2 INJECTION, SOLUTION INTRAMUSCULAR; INTRAVENOUS at 15:45

## 2020-04-07 RX ADMIN — DEXTROSE AND SODIUM CHLORIDE: 5; 900 INJECTION, SOLUTION INTRAVENOUS at 20:39

## 2020-04-07 RX ADMIN — DEXTROSE AND SODIUM CHLORIDE: 5; 900 INJECTION, SOLUTION INTRAVENOUS at 10:23

## 2020-04-07 RX ADMIN — SODIUM CHLORIDE 1000 ML: 9 INJECTION, SOLUTION INTRAVENOUS at 02:56

## 2020-04-07 ASSESSMENT — PAIN SCALES - WONG BAKER: WONGBAKER_NUMERICALRESPONSE: 0

## 2020-04-07 ASSESSMENT — PAIN - FUNCTIONAL ASSESSMENT: PAIN_FUNCTIONAL_ASSESSMENT: FLACC

## 2020-04-07 NOTE — PROGRESS NOTES
medication. 2. Physician will be contacted for respiratory rate (RR) greater than 35 breaths per minute. Therapy will be held for heart rate (HR) greater than 140 beats per minute, pending direction from physician. 3. Bronchodilators will be administered via Metered Dose Inhaler (MDI) with spacer when the following criteria are met:  a. Alert and cooperative     b. HR < 140 bpm  c. RR < 30 bpm                d. Can demonstrate a 2-3 second inspiratory hold  4. Bronchodilators will be administered via Hand Held Nebulizer ASTRID [facility]) to patients when ANY of the following criteria are met  a. Incognizant or uncooperative          b. Patients treated with HHN at Home        c. Unable to demonstrate proper use of MDI with spacer     d. RR > 30 bpm   5. Bronchodilators will be delivered via Metered Dose Inhaler (MDI), HHN, Aerogen to intubated patients on mechanical ventilation. 6. Inhalation medication orders will be delivered and/or substituted as outlined below. Aerosolized Medications Ordering and Administration Guidelines:    1. All Medications will be ordered by a physician, and their frequency and/or modality will be adjusted as defined by the patients Respiratory Severity Index (RSI) score. 2. If the patient does not have documented COPD, consider discontinuing anticholinergics when RSI is less than 9.  3. If the bronchospasm worsens (increased RSI), then the bronchodilator frequency can be increased to a maximum of every 4 hours. If greater than every 4 hours is required, the physician will be contacted. 4. If the bronchospasm improves, the frequency of the bronchodilator can be decreased, based on the patient's RSI, but not less than home treatment regimen frequency. 5. Bronchodilator(s) will be discontinued if patient has a RSI less than 9 and has received no scheduled or as needed treatment for 72  Hrs. Patients Ordered on a Mucolytic Agent:    1.  Must always be administered with a

## 2020-04-07 NOTE — H&P
currently lives at home with her      TOBACCO:   reports that she has been smoking cigarettes. She has a 0.64 pack-year smoking history. She has never used smokeless tobacco.  ETOH:   reports no history of alcohol use. E-Cigarettes Vaping or Juuling     Questions Responses    Vaping Use     Start Date     Does device contain nicotine? Quit Date     Vaping Type             Family History:      As noted above     REVIEW OF SYSTEMS:   Pertinent positives as noted in the HPI. All other systems reviewed and negative. PHYSICAL EXAM PERFORMED:    BP (!) 138/93   Pulse 68   Temp 98.1 °F (36.7 °C) (Oral)   Resp 16   SpO2 95%     General appearance:  No apparent distress   HEENT:  Normal cephalic, atraumatic without obvious deformity. Dysconjugate gaze, right eye with lateral deviation apparent   Pupils do not obviously respond to light but are about 3 mm across     Neck: Supple,  No jugular venous distention. Respiratory:  Normal respiratory effort. Clear to auscultation, bilaterally without Rales/Wheezes/Rhonchi. Cardiovascular:  Regular rate and rhythm with normal S1/S2 without murmurs, rubs or gallops. Abdomen: Soft, non-tender, non-distended   - G tube site with mild erythema   - ostomy intact on LLQ   Musculoskeletal:   Marked spasticity in her BLUE   - cooperation limits exam of BLLE   Skin: Skin warm, dry   Neurologic:    - vocalizes, but no meaningful response to this MD    - would not move arms or legs on command     Psychiatric:  Calm at times, other times vocalizing   Capillary Refill: Brisk,< 3 seconds   Peripheral Pulses: +2 palpable, equal bilaterally       Labs:     Recent Labs     04/07/20  0246   WBC 8.7   HGB 14.3   HCT 42.3        No results for input(s): NA, K, CL, CO2, BUN, CREATININE, CALCIUM, PHOS in the last 72 hours. Invalid input(s): MAGNES  No results for input(s): AST, ALT, BILIDIR, BILITOT, ALKPHOS in the last 72 hours.   No results for input(s): INR in the

## 2020-04-07 NOTE — ED PROVIDER NOTES
Joaquin Moreno C3 TELE/MED [facility]  EMERGENCY DEPARTMENT ENCOUNTER      Pt Name: Rolly Garay  MRN: [id number]  [place] 1965  Date of evaluation: 4/7/2020  Provider: Sam Goins MD    CHIEF COMPLAINT       Chief Complaint   Patient presents with    Other     feeding tube came out. patient is non verbal,  is POA and primary care giver         HISTORY OF PRESENT ILLNESS   (Location/Symptom, Timing/Onset, Context/Setting, Quality, Duration, Modifying Factors, Severity)  Note limiting factors. Rolly Garay is a 53 y.o. female who presents to the emergency department     Patient is a 56-year-old female with a past medical history of cardiopulmonary arrest resulting in anoxic brain injury who presents after her feeding tube fell out.  states that he was administering a feeding into the tube and it came out all of a sudden. The tube has been out for about 2 hours prior to arrival.    The history is provided by the EMS personnel and the spouse. Nursing Notes were reviewed. REVIEW OF SYSTEMS    (2-9 systems for level 4, 10 or more for level 5)     Review of Systems   Unable to perform ROS: Patient nonverbal       Except as noted above the remainder of the review of systems was reviewed and negative.        PAST MEDICAL HISTORY     Past Medical History:   Diagnosis Date    Anxiety     Arthritis of knee     Asthma     Benign breast biopsy     Hypercholesteremia     Hypertension     MRSA infection 12/2010    in eye         SURGICAL HISTORY       Past Surgical History:   Procedure Laterality Date    APPENDECTOMY      BACK SURGERY      BRONCHOSCOPY N/A 10/24/2018    BRONCHOSCOPY ALVEOLAR LAVAGE performed by Maritza Murphy MD at [address] [place] [address] N/A 10/27/2018    BRONCHOSCOPY ALVEOLAR LAVAGE performed by Maritza Murphy MD at [address] [place] [address]  12/13/2010    Incision and drainage right lower eyelid abscess, nasolacrimal duct probing on the right    HYSTERECTOMY      MOUTH SURGERY      SD AIRWAYS SURGICAL PROCEDURE UNLISTED N/A 10/26/2018    TRACHEOTOMY performed by Sheree Yadav MD at [facility] EGD [address] N/A 10/24/2018    EGD PEG TUBE PLACEMENT performed by Shalom Saravia MD at [facility]       Current Discharge Medication List      CONTINUE these medications which have NOT CHANGED    Details   baclofen (LIORESAL) 10 MG tablet 10 mg by PEG Tube route 2 times daily       clonazePAM (KLONOPIN) 2 MG tablet 2 mg by PEG Tube route 3 times daily as needed. atenolol (TENORMIN) 25 MG tablet 25 mg by PEG Tube route daily      zolpidem (AMBIEN) 5 MG tablet 5 mg by PEG Tube route nightly as needed for Sleep.      docusate (COLACE) 50 MG/5ML liquid 10 mLs by Per G Tube route 2 times daily  Qty: 100 mL, Refills: 0      simvastatin (ZOCOR) 20 MG tablet Take 20 mg by mouth nightly. paroxetine (PAXIL) 20 MG tablet Take 20 mg by mouth 2 times daily. ipratropium-albuterol (DUONEB) 0.5-2.5 (3) MG/3ML SOLN nebulizer solution Inhale 3 mLs into the lungs every 6 hours  Qty: 360 mL, Refills: 0      labetalol (NORMODYNE;TRANDATE) 5 MG/ML injection Infuse 4 mLs intravenously every 4 hours as needed (SBP >150)  Qty: 20 mL, Refills: 0      famotidine (PEPCID) 20 MG tablet 1 tablet by PEG Tube route 2 times daily  Qty: 60 tablet, Refills: 3             ALLERGIES     Aspirin; Nsaids; and Penicillins    FAMILY HISTORY     No family history on file.        SOCIAL HISTORY       Social History     Socioeconomic History    Marital status:      Spouse name: Not on file    Number of children: Not on file    Years of education: Not on file    Highest education level: Not on file   Occupational History    Not on file   Social Needs    Financial resource strain: Not on file    Food insecurity     Worry: Not on file     Inability: Not on file   Acuna came out but was unable to pass it. Tried to pass a smaller tube but was unable to find the tract and ultimately was unable to pass it, please see procedure note. Spoke with GI who recommended admission for replacement later today. Advised patient and spouse of this plan of care and they voiced understanding. Patient admitted to hospitalist for further management. CONSULTS:  IP CONSULT TO GI  IP CONSULT TO HOSPITALIST  IP CONSULT TO GI    PROCEDURES:  Unless otherwise noted below, none     Feeding Tube  Date/Time: 4/7/2020 5:44 AM  Performed by: aDni Ryan MD  Authorized by: Dani Ryan MD     Consent:     Consent obtained:  Verbal    Consent given by:  Spouse    Risks discussed:  Bleeding, infection and pain  Pre-procedure details: Old tube type:  Gastrostomy    Old tube size: 20 [place]. Procedure details:     Patient position:  Supine    Procedure type:  Replacement    Tube type:  Gastrostomy    Tube size: 22 [place]. Post-procedure details:     Placement difficulty:  Extreme    Bleeding:  Minimal    Patient tolerance of procedure: Procedure terminated due to failure to pass the tube. FINAL IMPRESSION      1. Complication of feeding tube [facility])          DISPOSITION/PLAN   DISPOSITION Admitted 04/07/2020 02:47:00 AM      PATIENT REFERRED TO:  Tera Tony MD  [address] [place] [address]  [phone]            DISCHARGE MEDICATIONS:  Current Discharge Medication List        Controlled Substances Monitoring:     No flowsheet data found.     (Please note that portions of this note were completed with a voice recognition program.  Efforts were made to edit the dictations but occasionally words are mis-transcribed.)    Arturo Black MD (electronically signed)  Attending Emergency Physician            Dani Ryan MD  04/07/20 0893

## 2020-04-07 NOTE — PROGRESS NOTES
Patient was seen by 1 of my partners this morning  Discussed with staff  Awaiting for PEG tube placement tomorrow, GI input appreciated  IV fluids D5 normal saline 75 cc/h  Hold meds via PEG

## 2020-04-07 NOTE — CONSULTS
Gastroenterology Consult Note    Patient:   Kimmie Lucero   YOB: 1965   Facility:   [facility]   Referring/PCP: Jessica Kevin MD  Date:     4/7/2020  Consultant:   Alex Aguilar MD    Subjective: This 53 y.o. female was admitted 4/7/2020 with a diagnosis of \"Gastrostomy tube dysfunction ([address] [K94.23]  Gastrostomy tube dysfunction ([facility] [address] [K94.23]\" and is seen in consultation regarding \"dysphagia\". Information was obtained from interview of  the patient's , examination of the patient, and review of records. I did update the past medical, surgical, social and / or family history. Dysphagia for months severe in throat assoc w anoxic brain injury    Current status  Present  Diet Order: Diet NPO Effective Now and she is not tolerating diet. Recently, she has experienced no abdominal  Pain and she has not required Intravenous narcotic analgesics. The patient has also experienced no constipation, diarrhea, fever, hematochezia, melena, nausea and vomiting      Prior to Admission medications    Medication Sig Start Date End Date Taking? Authorizing Provider   baclofen (LIORESAL) 10 MG tablet 10 mg by PEG Tube route 2 times daily    Yes Historical Provider, MD   clonazePAM (KLONOPIN) 2 MG tablet 2 mg by PEG Tube route 3 times daily as needed. Yes Historical Provider, MD   atenolol (TENORMIN) 25 MG tablet 25 mg by PEG Tube route daily   Yes Historical Provider, MD   zolpidem (AMBIEN) 5 MG tablet 5 mg by PEG Tube route nightly as needed for Sleep. Yes Historical Provider, MD   docusate (COLACE) 50 MG/5ML liquid 10 mLs by Per G Tube route 2 times daily 10/30/18  Yes Ileana Oneill MD   simvastatin (ZOCOR) 20 MG tablet Take 20 mg by mouth nightly. Yes Historical Provider, MD   paroxetine (PAXIL) 20 MG tablet Take 20 mg by mouth 2 times daily.  12/11/10  Yes Historical Provider, MD   ipratropium-albuterol (DUONEB) 0.5-2.5 (3) MG/3ML SOLN nebulizer [facility] ENDOSCOPY    SHOULDER SURGERY      TONSILLECTOMY         Social:   Social History     Tobacco Use    Smoking status: Current Every Day Smoker     Packs/day: 0.04     Years: 16.00     Pack years: 0.64     Types: Cigarettes    Smokeless tobacco: Never Used   Substance Use Topics    Alcohol use: No     Family: No family history on file. ROS: Pertinent items are noted in HPI.     Objective:   Vital Signs:  Temp (24hrs), Av.4 °F (36.9 °C), Min:98.1 °F (36.7 °C), Max:98.6 °F (37 °C)     Systolic (70APS), LMH:301 , Min:122 , QIR:138      Diastolic (76ZFS), JPH:12, Min:75, Max:93     Pulse  Av.5  Min: 68  Max: 71  /75   Pulse 71   Temp 98.4 °F (36.9 °C) (Axillary)   Resp 18   Ht 5' 6\" (1.676 m)   Wt 165 lb (74.8 kg)   SpO2 96%   BMI 26.63 kg/m²      Physical Exam:   /75   Pulse 71   Temp 98.4 °F (36.9 °C) (Axillary)   Resp 18   Ht 5' 6\" (1.676 m)   Wt 165 lb (74.8 kg)   SpO2 96%   BMI 26.63 kg/m²   General appearance: Alert, NAD, demented  Lungs: clear to auscultation bilaterally  Chest wall: no tenderness  Heart: regular rate and rhythm, S1, S2 normal, no murmur, click, rub or gallop  Abdomen: soft, non-tender; bowel sounds normal; no masses,  no organomegaly  Extremities: extremities normal, atraumatic, no cyanosis or edema  Skin: Skin color, texture, turgor normal. No rashes or lesions  Neurologic: Grossly normal except dementia    Lab and Imaging Review   Recent Labs     20  0246   WBC 8.7   HGB 14.3   MCV 91.9         K 4.3      CO2 27   BUN 14   CREATININE <0.5*   GLUCOSE 101*   CALCIUM 10.3   PROT 7.5   LABALBU 3.9   AST 36   ALT 51*   ALKPHOS 97   BILITOT 0.4       Assessment:     Patient Active Problem List    Diagnosis Date Noted    Preseptal cellulitis 2010     Priority: High    Asthma      Priority: Low    Hypertension      Priority: Low    Hypercholesteremia      Priority: Low    Anxiety      Priority: Low    Gastrostomy tube dysfunction ([facility] [address] 04/07/2020    Hypernatremia 10/24/2018    Acute respiratory failure with hypoxemia (HCC) 10/24/2018    Cardiomyopathy ([facility] [address] 10/22/2018    Pulmonary infiltrate 10/22/2018    Atelectasis 10/22/2018    Anoxic brain damage (HCC)     Abnormal echocardiogram     Respiratory arrest ([facility] [address] 10/14/2018    V-tach ([facility] [address] 10/14/2018    Sepsis ([facility] [address] 10/14/2018    Respiratory arrest before cardiac arrest ([facility] [address] 10/14/2018    Cardiac arrest with ventricular fibrillation ([facility] [address] 10/14/2018    Respiratory failure with hypoxia and hypercapnia ([facility] [address]      52 yo w asthma s/p intubation and anoxic brain injury in 10/2019, during which a PEG tube was placed, but she pulled it out last night and the tract had closed by the time she presented to the ER doctor. I was consulted to evaluate for a denovo PEG re-placement     Plan:   1. Will schedule PEG placement in am  2. Supportive care until then  3.  Will follow    ,Radha Covarrubias MD       [phone]) [phone]

## 2020-04-08 ENCOUNTER — ANESTHESIA (OUTPATIENT)
Dept: ENDOSCOPY | Age: 55
End: 2020-04-08
Payer: MEDICAID

## 2020-04-08 ENCOUNTER — ANESTHESIA EVENT (OUTPATIENT)
Dept: ENDOSCOPY | Age: 55
End: 2020-04-08
Payer: MEDICAID

## 2020-04-08 VITALS
SYSTOLIC BLOOD PRESSURE: 158 MMHG | RESPIRATION RATE: 22 BRPM | OXYGEN SATURATION: 97 % | DIASTOLIC BLOOD PRESSURE: 137 MMHG

## 2020-04-08 PROCEDURE — 7100000010 HC PHASE II RECOVERY - FIRST 15 MIN: Performed by: INTERNAL MEDICINE

## 2020-04-08 PROCEDURE — 2709999900 HC NON-CHARGEABLE SUPPLY: Performed by: INTERNAL MEDICINE

## 2020-04-08 PROCEDURE — 2580000003 HC RX 258: Performed by: NURSE ANESTHETIST, CERTIFIED REGISTERED

## 2020-04-08 PROCEDURE — 96376 TX/PRO/DX INJ SAME DRUG ADON: CPT

## 2020-04-08 PROCEDURE — 96375 TX/PRO/DX INJ NEW DRUG ADDON: CPT

## 2020-04-08 PROCEDURE — 6360000002 HC RX W HCPCS: Performed by: NURSE ANESTHETIST, CERTIFIED REGISTERED

## 2020-04-08 PROCEDURE — 96372 THER/PROPH/DIAG INJ SC/IM: CPT

## 2020-04-08 PROCEDURE — 3609013300 HC EGD TUBE PLACEMENT: Performed by: INTERNAL MEDICINE

## 2020-04-08 PROCEDURE — 2500000003 HC RX 250 WO HCPCS: Performed by: NURSE ANESTHETIST, CERTIFIED REGISTERED

## 2020-04-08 PROCEDURE — 2580000003 HC RX 258: Performed by: INTERNAL MEDICINE

## 2020-04-08 PROCEDURE — 3700000000 HC ANESTHESIA ATTENDED CARE: Performed by: INTERNAL MEDICINE

## 2020-04-08 PROCEDURE — G0378 HOSPITAL OBSERVATION PER HR: HCPCS

## 2020-04-08 PROCEDURE — 7100000011 HC PHASE II RECOVERY - ADDTL 15 MIN: Performed by: INTERNAL MEDICINE

## 2020-04-08 PROCEDURE — 6360000002 HC RX W HCPCS: Performed by: INTERNAL MEDICINE

## 2020-04-08 PROCEDURE — 2580000003 HC RX 258: Performed by: PEDIATRICS

## 2020-04-08 PROCEDURE — 6360000002 HC RX W HCPCS: Performed by: PEDIATRICS

## 2020-04-08 PROCEDURE — 94761 N-INVAS EAR/PLS OXIMETRY MLT: CPT

## 2020-04-08 PROCEDURE — 3700000001 HC ADD 15 MINUTES (ANESTHESIA): Performed by: INTERNAL MEDICINE

## 2020-04-08 PROCEDURE — 2700000000 HC OXYGEN THERAPY PER DAY

## 2020-04-08 RX ORDER — SODIUM CHLORIDE, SODIUM LACTATE, POTASSIUM CHLORIDE, CALCIUM CHLORIDE 600; 310; 30; 20 MG/100ML; MG/100ML; MG/100ML; MG/100ML
INJECTION, SOLUTION INTRAVENOUS CONTINUOUS PRN
Status: DISCONTINUED | OUTPATIENT
Start: 2020-04-08 | End: 2020-04-08 | Stop reason: SDUPTHER

## 2020-04-08 RX ORDER — LIDOCAINE HYDROCHLORIDE 20 MG/ML
INJECTION, SOLUTION INFILTRATION; PERINEURAL PRN
Status: DISCONTINUED | OUTPATIENT
Start: 2020-04-08 | End: 2020-04-08 | Stop reason: SDUPTHER

## 2020-04-08 RX ORDER — PROPOFOL 10 MG/ML
INJECTION, EMULSION INTRAVENOUS PRN
Status: DISCONTINUED | OUTPATIENT
Start: 2020-04-08 | End: 2020-04-08 | Stop reason: SDUPTHER

## 2020-04-08 RX ADMIN — HYDRALAZINE HYDROCHLORIDE 10 MG: 20 INJECTION INTRAMUSCULAR; INTRAVENOUS at 21:41

## 2020-04-08 RX ADMIN — CEFAZOLIN SODIUM 2 G: 10 INJECTION, POWDER, FOR SOLUTION INTRAVENOUS at 09:00

## 2020-04-08 RX ADMIN — Medication 10 ML: at 21:42

## 2020-04-08 RX ADMIN — DEXTROSE AND SODIUM CHLORIDE: 5; 900 INJECTION, SOLUTION INTRAVENOUS at 14:43

## 2020-04-08 RX ADMIN — PROPOFOL 10 MG: 10 INJECTION, EMULSION INTRAVENOUS at 09:12

## 2020-04-08 RX ADMIN — LORAZEPAM 1 MG: 2 INJECTION, SOLUTION INTRAMUSCULAR; INTRAVENOUS at 08:30

## 2020-04-08 RX ADMIN — LIDOCAINE HYDROCHLORIDE 50 MG: 20 INJECTION, SOLUTION INFILTRATION; PERINEURAL at 09:04

## 2020-04-08 RX ADMIN — PROPOFOL 10 MG: 10 INJECTION, EMULSION INTRAVENOUS at 09:18

## 2020-04-08 RX ADMIN — PROPOFOL 10 MG: 10 INJECTION, EMULSION INTRAVENOUS at 09:10

## 2020-04-08 RX ADMIN — SODIUM CHLORIDE, POTASSIUM CHLORIDE, SODIUM LACTATE AND CALCIUM CHLORIDE: 600; 310; 30; 20 INJECTION, SOLUTION INTRAVENOUS at 08:53

## 2020-04-08 RX ADMIN — ENOXAPARIN SODIUM 40 MG: 40 INJECTION SUBCUTANEOUS at 11:30

## 2020-04-08 RX ADMIN — PROPOFOL 10 MG: 10 INJECTION, EMULSION INTRAVENOUS at 09:16

## 2020-04-08 RX ADMIN — PROPOFOL 30 MG: 10 INJECTION, EMULSION INTRAVENOUS at 09:04

## 2020-04-08 RX ADMIN — PROPOFOL 30 MG: 10 INJECTION, EMULSION INTRAVENOUS at 09:07

## 2020-04-08 ASSESSMENT — PAIN SCALES - WONG BAKER: WONGBAKER_NUMERICALRESPONSE: 2

## 2020-04-08 NOTE — PROGRESS NOTES
Hospitalist Progress Note      PCP: Efrain Rodríguez MD    Date of Admission: 4/7/2020    Chief Complaint: G tube came out     Hospital Course:   53 y.o. female who presented to [facility] with G tube coming out. The tube that came out tonight was her 3rd G tube and was placed about 4 months ago. She was discharged from a nursing home to home with her  around 3/3/20. G tube has been working well for him recently until tonight 4/6/20 PM when he went to connect her feeds it slipped out and he realized he was holding it in his hand but it was no longer in place.      Per her  she had an asthma attack in 2018 that caused her to collapse and resulted in anoxic brain injury.      She gets 4-5 boxes of a nestle product feed per day.      In the ED the staff attempted to replace the tube twice, but was unsuccessful both times (got in about 2 cm per report but then unable to secure further). Subjective:    s/p peg tube placement.  Not in pain       Medications:  Reviewed    Infusion Medications    dextrose 5 % and 0.9 % NaCl 75 mL/hr at 04/07/20 2039     Scheduled Medications    atenolol  25 mg PEG Tube Daily    baclofen  10 mg PEG Tube BID    docusate  100 mg Per G Tube BID    famotidine  20 mg PEG Tube BID    sodium chloride flush  10 mL Intravenous 2 times per day    enoxaparin  40 mg Subcutaneous Daily    atorvastatin  20 mg PEG Tube Daily    PARoxetine  20 mg PEG Tube BID     PRN Meds: clonazePAM, labetalol, zolpidem, sodium chloride flush, acetaminophen **OR** acetaminophen, ondansetron, LORazepam, ipratropium-albuterol, hydrALAZINE      Intake/Output Summary (Last 24 hours) at 4/8/2020 1207  Last data filed at 4/8/2020 6802  Gross per 24 hour   Intake 1432.5 ml   Output 1450 ml   Net -17.5 ml       Physical Exam Performed:    /77   Pulse 122   Temp 98.7 °F (37.1 °C) (Axillary)   Resp 16   Ht 5' 6\" (1.676 m)   Wt 165 lb (74.8 kg)   SpO2 95%   BMI 26.63 kg/m² support   - most meds held for now as PEG tube out, with plan to resume meds per home routine when PEG tube replaced      Hx of v tach, anoxic brain injury / spasticity / anxiety:  - baclofen 10 mg PEG BID   - clonazepam 2 mg PEG tube TID PRN (substitute lorazepam 1 mg TID PRN while PEG out)      Hypertension:   - atenolol 25 mg PEG tube daily -currently on hold blood pressure stable , on  hydralazine PRN     Constipation:   - docusate 100 mg G tube BID     GERD:   - famotidine 20 mg PEG BID      Mood disorder:   - paroxetine 20 BID      Dyslipidemia:   - simvastatin 20 QHS      Pulmonary support:   - albuterol q6 hours      Sleep aid:   - zolpidem 5 mg PEG QHS     DVT Prophylaxis: Lovenox  Diet: Diet NPO Effective Now  Code Status: Full Code    PT/OT Eval Status:     Dispo -Munoz once we start the PEG feeding    Gerardo Jansen MD

## 2020-04-08 NOTE — OP NOTE
Percutaneous Endoscopic Gastrostomy Note    Patient:   Da Nichole   YOB: 1965   Facility:   [facility] [Inpatient]   Referring/PCP: Jonathan Martines MD  Procedure:   Esophagogastroduodenoscopy with placement of a percutaneous endoscopic gastrostomy tube  Date:     4/8/2020   Endoscopist:  Kori Mcgee     Preoperative Diagnosis:  Feeding Difficulties  Postoperative Diagnosis:  Feeding Difficulties  Preprocedure medications: Two grams of Cefazolin were given. immediately prior to the procedure. Anesthesia: Propofol  Estimated blood loss: Estimated amount of blood loss is 2 ml. Complications: None  Description of Procedure:  Informed consent was obtained from the patient's  after explanation of the procedure including indications, description of the procedure,  benefits and possible risks and complications of the procedure, and alternatives. Questions were answered. The patient's history was reviewed and a directed physical examination was performed prior to the procedure. Patient recieved prophylactic antibiotics immediately prior to the start of the procedure and was monitored throughout the procedure with pulse oximetry and periodic assessment of vital signs. Patient was sedated as noted above. The Nursing staff and I performed a time out. With the patient in supine position with the head of the bed slightly elevated, Olympus  flexible endoscope was introduced and passed without difficulty. Visualization of the esophagus, stomach, and duodenum was performed and retroflexed view of the proximal stomach was obtained. The scope was passed to the 2nd portion of the duodenum. Esophagus: normal.  Stomach: normal  Duodenum: normal  No contraindication to placement of the gastrostomy tube was appreciated. The site for placement of the gastrostomy tube was identified with palpation and   transillumination of the abdomen.  The abdomen, having been prepared, was draped

## 2020-04-08 NOTE — ANESTHESIA PRE PROCEDURE
Babar Still MD        docusate (COLACE) 50 MG/5ML liquid 100 mg  100 mg Per G Tube BID Kim Lomas MD        famotidine (PEPCID) tablet 20 mg  20 mg PEG Tube BID Ron Carias MD        labetalol (NORMODYNE;TRANDATE) injection 20 mg  20 mg Intravenous Q4H PRN Ron Carias MD        zolpidem (AMBIEN) tablet 5 mg  5 mg PEG Tube Nightly PRN Kim Lomas MD        sodium chloride flush 0.9 % injection 10 mL  10 mL Intravenous 2 times per day Kim Lomas MD        sodium chloride flush 0.9 % injection 10 mL  10 mL Intravenous PRN Kim Lomas MD        acetaminophen (TYLENOL) tablet 650 mg  650 mg Oral Q6H PRN Ron Carias MD        Or    acetaminophen (TYLENOL) suppository 650 mg  650 mg Rectal Q6H PRN Ron Carias MD        ondansetron (ZOFRAN) injection 4 mg  4 mg Intravenous Q6H PRN Ron Carias MD        enoxaparin (LOVENOX) injection 40 mg  40 mg Subcutaneous Daily Ron Carias MD        LORazepam (ATIVAN) injection 1 mg  1 mg Intravenous TID PRN Kim Lomas MD   1 mg at 04/08/20 0830    atorvastatin (LIPITOR) tablet 20 mg  20 mg PEG Tube Daily Hemant Dunbar MD        PARoxetine (PAXIL) tablet 20 mg  20 mg PEG Tube BID Hemant Dunbar MD        dextrose 5 % and 0.9 % sodium chloride infusion   Intravenous Continuous Hemant Dunbar MD 75 mL/hr at 04/07/20 2039      ipratropium-albuterol (DUONEB) nebulizer solution 3 mL  3 mL Inhalation Q4H PRN Hemant Dunbar MD        hydrALAZINE (APRESOLINE) injection 10 mg  10 mg Intravenous Q6H PRN Hemant Dunbar MD         Facility-Administered Medications Ordered in Other Encounters   Medication Dose Route Frequency Provider Last Rate Last Dose    lactated ringers infusion    Continuous PRN JAYANT Arambula - CRNA        propofol injection    PRN JAYANT Arambula - CRNA   10 mg at 04/08/20 0918    lidocaine 2 % injection    PRN JAYANT Arambula CRNA POCCL, POCBUN, POCHEMO, POCHCT in the last 72 hours. Coags:   Lab Results   Component Value Date    PROTIME 10.8 10/14/2018    INR 0.95 10/14/2018    APTT 37.8 12/12/2010       HCG (If Applicable): No results found for: PREGTESTUR, PREGSERUM, HCG, HCGQUANT     ABGs:   Lab Results   Component Value Date    PHART 7.487 10/30/2018    PO2ART 70.4 10/30/2018    EZB6RVZ 44.9 10/30/2018    PDG1HXG 33.2 10/30/2018    BEART 8.8 10/30/2018    J8XGTBID 94.9 10/30/2018        Type & Screen (If Applicable):  No results found for: LABABO, [address]    Anesthesia Evaluation  Patient summary reviewed and Nursing notes reviewed  Airway: Mallampati: III  TM distance: >3 FB   Neck ROM: full  Mouth opening: > = 3 FB Dental:    (+) edentulous      Pulmonary:Negative Pulmonary ROS and normal exam  breath sounds clear to auscultation  (+) asthma:                            Cardiovascular:    (+) hypertension:, hyperlipidemia        Rhythm: regular  Rate: normal                    Neuro/Psych:   (+) depression/anxiety              ROS comment: H/o anoxic brain injury; non-verbal GI/Hepatic/Renal: Neg GI/Hepatic/Renal ROS            Endo/Other: Negative Endo/Other ROS                    Abdominal:           Vascular: negative vascular ROS. Anesthesia Plan      MAC     ASA 3       Induction: intravenous. Anesthetic plan and risks discussed with patient. Plan discussed with CRNA.                   Maura Mota MD   4/8/2020

## 2020-04-08 NOTE — ANESTHESIA POSTPROCEDURE EVALUATION
Department of Anesthesiology  Postprocedure Note    Patient: Dimitris Graves  MRN: [id number]  YOB: 1965  Date of evaluation: 4/8/2020  Time:  11:37 AM     Procedure Summary     Date:  04/08/20 Room / Location:  [facility]    Anesthesia Start:  5167 Anesthesia Stop:  3792    Procedure:  EGD PEG TUBE INSERTION W/ ANESTHESIA (N/A ) Diagnosis:       Gastrostomy tube dysfunction ([address]      (Gastrostomy tube dysfunction ([address] [M98.32])    Surgeon:  Liza Pina MD Responsible Provider:  Mortimer Agar, MD    Anesthesia Type:  MAC ASA Status:  3          Anesthesia Type: No value filed. Pedro Phase I: Pedro Score: 10    Pedro Phase II: Pedro Score: 8    Last vitals: Reviewed and per EMR flowsheets.        Anesthesia Post Evaluation    Patient location during evaluation: PACU  Patient participation: complete - patient participated  Level of consciousness: awake and alert  Pain score: 0  Airway patency: patent  Nausea & Vomiting: no nausea and no vomiting  Complications: no  Cardiovascular status: blood pressure returned to baseline  Respiratory status: acceptable  Hydration status: stable

## 2020-04-08 NOTE — H&P
Pre-sedation Assessment    History and Physical / Pre-Sedation Assessment  Patient:  Princess Allen   :   1965     Intended Procedure: PEG placement      HPI: 54 yo w asthma s/p intubation and anoxic brain injury in 10/2019, during which a PEG tube was placed, but she pulled it out last night and the tract had closed by the time she presented to the ER doctor. I was consulted to evaluate for a denovo PEG re-placement      Plan:   1.  Will schedule PEG placement     Current Facility-Administered Medications   Medication Dose Route Frequency Provider Last Rate Last Dose    ceFAZolin (ANCEF) 2 g in dextrose 5 % 100 mL IVPB  2 g Intravenous Once Holly Decker MD        atenolol (TENORMIN) tablet 25 mg  25 mg PEG Tube Daily Ron Blandon MD        baclofen (LIORESAL) tablet 10 mg  10 mg PEG Tube BID Ron Blandon MD        clonazePAM (KLONOPIN) tablet 2 mg  2 mg PEG Tube TID PRN Renae Morrell MD        docusate (COLACE) 50 MG/5ML liquid 100 mg  100 mg Per G Tube BID Renae Morrell MD        famotidine (PEPCID) tablet 20 mg  20 mg PEG Tube BID Ron Blandon MD        labetalol (NORMODYNE;TRANDATE) injection 20 mg  20 mg Intravenous Q4H PRN Ron Blandon MD        zolpidem (AMBIEN) tablet 5 mg  5 mg PEG Tube Nightly PRN Ron Blandon MD        sodium chloride flush 0.9 % injection 10 mL  10 mL Intravenous 2 times per day Renae Morrell MD        sodium chloride flush 0.9 % injection 10 mL  10 mL Intravenous PRN Renae Morrell MD        acetaminophen (TYLENOL) tablet 650 mg  650 mg Oral Q6H PRN Ron Blandon MD        Or    acetaminophen (TYLENOL) suppository 650 mg  650 mg Rectal Q6H PRN Ron Blandon MD        ondansetron (ZOFRAN) injection 4 mg  4 mg Intravenous Q6H PRN Ron Blandon MD        enoxaparin (LOVENOX) injection 40 mg  40 mg Subcutaneous Daily Ron Blandon MD        LORazepam (ATIVAN) injection 1 mg  1 mg Intravenous TID PRN Tony Huddleston MD   1 mg at 04/07/20 1545    atorvastatin (LIPITOR) tablet 20 mg  20 mg PEG Tube Daily Louis Gutierrez MD        PARoxetine (PAXIL) tablet 20 mg  20 mg PEG Tube BID Louis Gutierrez MD        dextrose 5 % and 0.9 % sodium chloride infusion   Intravenous Continuous Louis Gutierrez MD 75 mL/hr at 04/07/20 2039      ipratropium-albuterol (DUONEB) nebulizer solution 3 mL  3 mL Inhalation Q4H PRN Louis Gutierrez MD        hydrALAZINE (APRESOLINE) injection 10 mg  10 mg Intravenous Q6H PRN Louis Gutierrez MD         Past Medical History:   Diagnosis Date    Anxiety     Arthritis of knee     Asthma     Benign breast biopsy     Hypercholesteremia     Hypertension     MRSA infection 12/2010    in eye     Past Surgical History:   Procedure Laterality Date    APPENDECTOMY      BACK SURGERY      BRONCHOSCOPY N/A 10/24/2018    BRONCHOSCOPY ALVEOLAR LAVAGE performed by Kian Blanton MD at [address] [place] [address] N/A 10/27/2018    BRONCHOSCOPY ALVEOLAR LAVAGE performed by Kian Blanton MD at [address] [place] [address]  12/13/2010    Incision and drainage right lower eyelid abscess, nasolacrimal duct probing on the right    HYSTERECTOMY      MOUTH SURGERY      [place] AIRWAYS SURGICAL PROCEDURE UNLISTED N/A 10/26/2018    TRACHEOTOMY performed by Saint Cashing, MD at [address] [facility] EGD [address] N/A 10/24/2018    EGD PEG TUBE PLACEMENT performed by Shannon Hernández MD at [address] [facility]         Nurses notes reviewed and agreed. Medications reviewed  Allergies:    Allergies   Allergen Reactions    Aspirin Hives    Nsaids      nausea    Penicillins            Physical Exam:  Vital Signs: /73 Comment: Pt is calm (not screaming) during this BP reading  Pulse 83   Temp 98.2 °F (36.8 °C) (Axillary)   Resp 22   Ht 5' 6\" (1.676 m)   Wt 165 lb (74.8 kg)   SpO2 95%   BMI

## 2020-04-08 NOTE — CONSULTS
Ostomy Referral Progress Note      NAME:  Flako Allen  MEDICAL RECORD NUMBER:  [id number]  AGE: 53 y.o. GENDER:  female  :  1965  TODAY'S DATE:  2020    Katerina Allen is a 53 y.o. female referred by:   [] Physician  [] Nursing  [] Other:     Established  Colostomy    Surgeon Pepe Parker Laparoscopic Diverting Colostomy 2019    PAST MEDICAL HISTORY:        Diagnosis Date    Anxiety     Arthritis of knee     Asthma     Benign breast biopsy     Hypercholesteremia     Hypertension     MRSA infection 2010    in eye       MEDICATIONS:    No current facility-administered medications on file prior to encounter. Current Outpatient Medications on File Prior to Encounter   Medication Sig Dispense Refill    baclofen (LIORESAL) 10 MG tablet 10 mg by PEG Tube route 2 times daily       clonazePAM (KLONOPIN) 2 MG tablet 2 mg by PEG Tube route 3 times daily as needed.  atenolol (TENORMIN) 25 MG tablet 25 mg by PEG Tube route daily      zolpidem (AMBIEN) 5 MG tablet 5 mg by PEG Tube route nightly as needed for Sleep.  docusate (COLACE) 50 MG/5ML liquid 10 mLs by Per G Tube route 2 times daily 100 mL 0    simvastatin (ZOCOR) 20 MG tablet Take 20 mg by mouth nightly.  paroxetine (PAXIL) 20 MG tablet Take 20 mg by mouth 2 times daily.       ipratropium-albuterol (DUONEB) 0.5-2.5 (3) MG/3ML SOLN nebulizer solution Inhale 3 mLs into the lungs every 6 hours 360 mL 0    labetalol (NORMODYNE;TRANDATE) 5 MG/ML injection Infuse 4 mLs intravenously every 4 hours as needed (SBP >150) 20 mL 0    famotidine (PEPCID) 20 MG tablet 1 tablet by PEG Tube route 2 times daily 60 tablet 3       ALLERGIES:    Allergies   Allergen Reactions    Aspirin Hives    Nsaids      nausea    Penicillins        PAST SURGICAL HISTORY:    Past Surgical History:   Procedure Laterality Date    APPENDECTOMY      BACK SURGERY      BRONCHOSCOPY upon discharge: home with support    Outpatient visit plan No  Supplies given No   Samples requested No    Referrals:  [x]   [x] [facility] active with [facility]  [] Supplies  [] Other      Patient/Caregiver Teaching:  Written Instructions given to patient/family  Teaching provided:  [] Reviewed GI and A&P        [] Supplies  [] Pouch emptying      [] Manipulate closure  [] Routine Care         [] Comment  [] Pouch maintenance           Level of patient/caregiver understanding able to:  [] Indicates understanding       [] Needs reinforcement  [] Unsuccessful      [] Verbal Understanding  [] Demonstrated understanding       [] No evidence of learning  [] Refused teaching         [] N/A    Electronically signed by Bulmaro Og, RN, CWOCN on 4/8/2020 at 2:13 PM

## 2020-04-09 LAB
ANION GAP SERPL CALCULATED.3IONS-SCNC: 11 MMOL/L (ref 3–16)
BUN BLDV-MCNC: 6 MG/DL (ref 7–20)
CALCIUM SERPL-MCNC: 9.5 MG/DL (ref 8.3–10.6)
CHLORIDE BLD-SCNC: 106 MMOL/L (ref 99–110)
CO2: 25 MMOL/L (ref 21–32)
CREAT SERPL-MCNC: <0.5 MG/DL (ref 0.6–1.1)
GFR AFRICAN AMERICAN: >60
GFR NON-AFRICAN AMERICAN: >60
GLUCOSE BLD-MCNC: 110 MG/DL (ref 70–99)
POTASSIUM SERPL-SCNC: 4 MMOL/L (ref 3.5–5.1)
SODIUM BLD-SCNC: 142 MMOL/L (ref 136–145)

## 2020-04-09 PROCEDURE — 96372 THER/PROPH/DIAG INJ SC/IM: CPT

## 2020-04-09 PROCEDURE — 80048 BASIC METABOLIC PNL TOTAL CA: CPT

## 2020-04-09 PROCEDURE — 6360000002 HC RX W HCPCS: Performed by: PEDIATRICS

## 2020-04-09 PROCEDURE — 36415 COLL VENOUS BLD VENIPUNCTURE: CPT

## 2020-04-09 PROCEDURE — 6370000000 HC RX 637 (ALT 250 FOR IP): Performed by: INTERNAL MEDICINE

## 2020-04-09 PROCEDURE — 2580000003 HC RX 258: Performed by: NURSE PRACTITIONER

## 2020-04-09 PROCEDURE — G0378 HOSPITAL OBSERVATION PER HR: HCPCS

## 2020-04-09 PROCEDURE — 51798 US URINE CAPACITY MEASURE: CPT

## 2020-04-09 PROCEDURE — 2580000003 HC RX 258: Performed by: INTERNAL MEDICINE

## 2020-04-09 PROCEDURE — 6370000000 HC RX 637 (ALT 250 FOR IP): Performed by: PEDIATRICS

## 2020-04-09 PROCEDURE — 2580000003 HC RX 258: Performed by: PEDIATRICS

## 2020-04-09 RX ORDER — 0.9 % SODIUM CHLORIDE 0.9 %
500 INTRAVENOUS SOLUTION INTRAVENOUS ONCE
Status: COMPLETED | OUTPATIENT
Start: 2020-04-09 | End: 2020-04-09

## 2020-04-09 RX ORDER — LABETALOL HYDROCHLORIDE 5 MG/ML
5 INJECTION, SOLUTION INTRAVENOUS EVERY 4 HOURS PRN
Status: DISCONTINUED | OUTPATIENT
Start: 2020-04-09 | End: 2020-04-10 | Stop reason: HOSPADM

## 2020-04-09 RX ADMIN — CLONAZEPAM 2 MG: 1 TABLET ORAL at 22:01

## 2020-04-09 RX ADMIN — Medication 10 ML: at 22:02

## 2020-04-09 RX ADMIN — BACLOFEN 10 MG: 10 TABLET ORAL at 22:02

## 2020-04-09 RX ADMIN — FAMOTIDINE 20 MG: 20 TABLET, FILM COATED ORAL at 22:02

## 2020-04-09 RX ADMIN — DOCUSATE SODIUM 100 MG: 50 LIQUID ORAL at 09:33

## 2020-04-09 RX ADMIN — FAMOTIDINE 20 MG: 20 TABLET, FILM COATED ORAL at 09:33

## 2020-04-09 RX ADMIN — PAROXETINE HYDROCHLORIDE 20 MG: 20 TABLET, FILM COATED ORAL at 09:33

## 2020-04-09 RX ADMIN — DEXTROSE AND SODIUM CHLORIDE: 5; 900 INJECTION, SOLUTION INTRAVENOUS at 05:05

## 2020-04-09 RX ADMIN — ATENOLOL 25 MG: 25 TABLET ORAL at 09:33

## 2020-04-09 RX ADMIN — PAROXETINE HYDROCHLORIDE 20 MG: 20 TABLET, FILM COATED ORAL at 22:01

## 2020-04-09 RX ADMIN — ATORVASTATIN CALCIUM 20 MG: 10 TABLET, FILM COATED ORAL at 09:33

## 2020-04-09 RX ADMIN — DOCUSATE SODIUM 100 MG: 50 LIQUID ORAL at 22:02

## 2020-04-09 RX ADMIN — SODIUM CHLORIDE 500 ML: 9 INJECTION, SOLUTION INTRAVENOUS at 05:59

## 2020-04-09 RX ADMIN — BACLOFEN 10 MG: 10 TABLET ORAL at 09:33

## 2020-04-09 RX ADMIN — ENOXAPARIN SODIUM 40 MG: 40 INJECTION SUBCUTANEOUS at 09:34

## 2020-04-09 ASSESSMENT — PAIN SCALES - WONG BAKER
WONGBAKER_NUMERICALRESPONSE: 4
WONGBAKER_NUMERICALRESPONSE: 0
WONGBAKER_NUMERICALRESPONSE: 0

## 2020-04-09 NOTE — PLAN OF CARE
Patient remains free from falls and injuries. Patient unable to follow directions. Does get anxious when touched and flails arms. Patient calm at rest though.  Shade Knee

## 2020-04-09 NOTE — PROGRESS NOTES
Hospitalist Progress Note      PCP: Samantha John MD    Date of Admission: 4/7/2020    Chief Complaint: G tube came out     Hospital Course:   53 y.o. female who presented to [facility] with G tube coming out. The tube that came out tonight was her 3rd G tube and was placed about 4 months ago. She was discharged from a nursing home to home with her  around 3/3/20. G tube has been working well for him recently until tonight 4/6/20 PM when he went to connect her feeds it slipped out and he realized he was holding it in his hand but it was no longer in place.      Per her  she had an asthma attack in 2018 that caused her to collapse and resulted in anoxic brain injury.      She gets 4-5 boxes of a nestle product feed per day.      In the ED the staff attempted to replace the tube twice, but was unsuccessful both times (got in about 2 cm per report but then unable to secure further). Subjective:    s/p peg tube placement yesterday and GI oked for TF to be resumed today  . Patient nonverbal at baseline . Noted being tachycardic this morning .   Will resume home medications and monitor      Medications:  Reviewed    Scheduled Medications    atenolol  25 mg PEG Tube Daily    baclofen  10 mg PEG Tube BID    docusate  100 mg Per G Tube BID    famotidine  20 mg PEG Tube BID    sodium chloride flush  10 mL Intravenous 2 times per day    enoxaparin  40 mg Subcutaneous Daily    atorvastatin  20 mg PEG Tube Daily    PARoxetine  20 mg PEG Tube BID     PRN Meds: labetalol, clonazePAM, zolpidem, sodium chloride flush, acetaminophen **OR** acetaminophen, ondansetron, LORazepam, ipratropium-albuterol, hydrALAZINE      Intake/Output Summary (Last 24 hours) at 4/9/2020 1410  Last data filed at 4/9/2020 1408  Gross per 24 hour   Intake 1350 ml   Output 300 ml   Net 1050 ml       Physical Exam Performed:  BP (!) 147/101   Pulse 111   Temp 98.6 °F (37 °C) (Axillary)   Resp 18   Ht 5' 6\"

## 2020-04-09 NOTE — PROGRESS NOTES
Peg tube flushed with 30mL tap water. meds crushed and mixed with 30mL tap water and administered through peg tube.  20mL tap water to flush tube again. Patient tolerated well.  Domonique Gray

## 2020-04-09 NOTE — PLAN OF CARE
S/O: rn reported pt only had 75 ml urine output for shift and bladder scanned for only minimal amount residual  A/P: suspect 2/2 to low volume as Peg is not able to be used yet. 500 ml bolus ordered.  BMP to check kidney function - pending

## 2020-04-09 NOTE — PROGRESS NOTES
Pt attempts to spit at and hit/kick staff when staff attempts to get her vitals and reposition in the bed. Otherwise, patient is resting quietly and comfortably in the bed. Assessment completed as charted. Will continue to monitor.

## 2020-04-09 NOTE — PROGRESS NOTES
Pt given Ancef prior to PEG tube placement procedure. PEG placed using transillumination. Bolster placed at 4 cm. Dressing applied and abdominal binder in place.

## 2020-04-09 NOTE — PLAN OF CARE
Problem: Falls - Risk of:  Goal: Will remain free from falls  Description: Will remain free from falls  Outcome: Ongoing

## 2020-04-09 NOTE — PROGRESS NOTES
Patient's peg tube flushed with 60mL tap water prior to bolus feed, patient given 240 mL bolus feed of 1.5 calorie with fiber. Tube flushed with 60mL tap water after bolus. Patient tolerated well.  Erenest Mustard

## 2020-04-10 VITALS
WEIGHT: 165 LBS | HEART RATE: 98 BPM | DIASTOLIC BLOOD PRESSURE: 92 MMHG | OXYGEN SATURATION: 94 % | BODY MASS INDEX: 26.52 KG/M2 | TEMPERATURE: 99 F | SYSTOLIC BLOOD PRESSURE: 156 MMHG | HEIGHT: 66 IN | RESPIRATION RATE: 16 BRPM

## 2020-04-10 LAB
GLUCOSE BLD-MCNC: 101 MG/DL (ref 70–99)
GLUCOSE BLD-MCNC: 87 MG/DL (ref 70–99)
GLUCOSE BLD-MCNC: 89 MG/DL (ref 70–99)
PERFORMED ON: ABNORMAL
PERFORMED ON: NORMAL
PERFORMED ON: NORMAL

## 2020-04-10 PROCEDURE — 6370000000 HC RX 637 (ALT 250 FOR IP): Performed by: INTERNAL MEDICINE

## 2020-04-10 PROCEDURE — 6370000000 HC RX 637 (ALT 250 FOR IP): Performed by: PEDIATRICS

## 2020-04-10 PROCEDURE — 96372 THER/PROPH/DIAG INJ SC/IM: CPT

## 2020-04-10 PROCEDURE — G0378 HOSPITAL OBSERVATION PER HR: HCPCS

## 2020-04-10 PROCEDURE — 2580000003 HC RX 258: Performed by: PEDIATRICS

## 2020-04-10 PROCEDURE — 6360000002 HC RX W HCPCS: Performed by: PEDIATRICS

## 2020-04-10 PROCEDURE — 96376 TX/PRO/DX INJ SAME DRUG ADON: CPT

## 2020-04-10 RX ORDER — NICOTINE POLACRILEX 4 MG
15 LOZENGE BUCCAL PRN
Status: DISCONTINUED | OUTPATIENT
Start: 2020-04-10 | End: 2020-04-10 | Stop reason: HOSPADM

## 2020-04-10 RX ORDER — DEXTROSE MONOHYDRATE 50 MG/ML
100 INJECTION, SOLUTION INTRAVENOUS PRN
Status: DISCONTINUED | OUTPATIENT
Start: 2020-04-10 | End: 2020-04-10 | Stop reason: HOSPADM

## 2020-04-10 RX ORDER — DEXTROSE MONOHYDRATE 25 G/50ML
12.5 INJECTION, SOLUTION INTRAVENOUS PRN
Status: DISCONTINUED | OUTPATIENT
Start: 2020-04-10 | End: 2020-04-10 | Stop reason: HOSPADM

## 2020-04-10 RX ADMIN — ENOXAPARIN SODIUM 40 MG: 40 INJECTION SUBCUTANEOUS at 09:39

## 2020-04-10 RX ADMIN — FAMOTIDINE 20 MG: 20 TABLET, FILM COATED ORAL at 09:40

## 2020-04-10 RX ADMIN — DOCUSATE SODIUM 100 MG: 50 LIQUID ORAL at 09:39

## 2020-04-10 RX ADMIN — LORAZEPAM 1 MG: 2 INJECTION, SOLUTION INTRAMUSCULAR; INTRAVENOUS at 11:49

## 2020-04-10 RX ADMIN — BACLOFEN 10 MG: 10 TABLET ORAL at 09:39

## 2020-04-10 RX ADMIN — PAROXETINE HYDROCHLORIDE 20 MG: 20 TABLET, FILM COATED ORAL at 09:40

## 2020-04-10 RX ADMIN — Medication 10 ML: at 09:40

## 2020-04-10 RX ADMIN — ATORVASTATIN CALCIUM 20 MG: 10 TABLET, FILM COATED ORAL at 09:39

## 2020-04-10 RX ADMIN — ATENOLOL 25 MG: 25 TABLET ORAL at 09:40

## 2020-04-10 ASSESSMENT — PAIN SCALES - WONG BAKER
WONGBAKER_NUMERICALRESPONSE: 4
WONGBAKER_NUMERICALRESPONSE: 0

## 2020-04-10 NOTE — DISCHARGE INSTR - COC
Continuity of Care Form    Patient Name: Justin Angeles   :  1965  MRN:  [id number]    Admit date:  2020  Discharge date:  ***    Code Status Order: Full Code   Advance Directives:   Advance Care Flowsheet Documentation     Date/Time Healthcare Directive Type of Healthcare Directive Copy in [address] Agent's Name Healthcare Agent's Phone Number    [phone]  Yes, patient has an advance directive for healthcare treatment  Durable power of  for health care  No, copy requested from family  [address]  [phone]          Admitting Physician:  Saul Lara MD  PCP: Kamilah Del Valle MD    Discharging Nurse: [facility] Unit/Room#: 0327/0327-01  Discharging Unit Phone Number: ***    Emergency Contact:   Extended Emergency Contact Information  Primary Emergency Contact: Blaise Jauregui [address] Phone: [phone]  Relation: Child  Secondary Emergency Contact: Pepe Allen  Address: [address] [place] [address], [address] [place] [address],[address] Phone: [phone]  Mobile Phone: [phone]  Relation: Spouse    Past Surgical History:  Past Surgical History:   Procedure Laterality Date    APPENDECTOMY      BACK SURGERY      BRONCHOSCOPY N/A 10/24/2018    BRONCHOSCOPY ALVEOLAR LAVAGE performed by Norris Birmingham MD at [address] N/A 10/27/2018    BRONCHOSCOPY ALVEOLAR LAVAGE performed by Norris Birmingham MD at [address] [place] [address]  2010    Incision and drainage right lower eyelid abscess, nasolacrimal duct probing on the right    HYSTERECTOMY      MOUTH SURGERY      [address] N/A 10/26/2018    TRACHEOTOMY performed by Caridad Cuba MD at [address] [place] [address] EGD [address] N/A 10/24/2018    EGD PEG TUBE PLACEMENT performed by Carol Ann Ellis MD at [facility]

## 2020-04-10 NOTE — DISCHARGE INSTR - DIET

## 2020-04-10 NOTE — PROGRESS NOTES
Patient's Peg tube flushed with 60ml tap water to clear line. Patient given meds crushed and in 30mL of tap water through peg tube. Patient was then given 240mL tube feed through peg tube and then flushed with another 60mL of tap water. Patient tolerated well.  Mannie Dominguez

## 2020-04-10 NOTE — PROGRESS NOTES
Melissa Carbajal is a 53 y.o. female patient.     Current Facility-Administered Medications   Medication Dose Route Frequency Provider Last Rate Last Dose    glucose (GLUTOSE) 40 % oral gel 15 g  15 g Oral PRN Sondra Boucher, APRN - CNP        dextrose 50 % IV solution  12.5 g Intravenous PRN Keyshae Chaitanyauck, APRN - CNP        glucagon (rDNA) injection 1 mg  1 mg Intramuscular PRN Keyshae Citlaly, APRN - CNP        dextrose 5 % solution  100 mL/hr Intravenous PRN Sondra Boucher, APRN - CNP        labetalol (NORMODYNE;TRANDATE) injection 5 mg  5 mg Intravenous Q4H PRN Merry Alfonso MD        atenolol (TENORMIN) tablet 25 mg  25 mg PEG Tube Daily Stephen Saucedo MD   25 mg at 04/09/20 0933    baclofen (LIORESAL) tablet 10 mg  10 mg PEG Tube BID Stephen Saucedo MD   10 mg at 04/09/20 2202    clonazePAM (KLONOPIN) tablet 2 mg  2 mg PEG Tube TID PRN Stephen Saucedo MD   2 mg at 04/09/20 2201    docusate (COLACE) 50 MG/5ML liquid 100 mg  100 mg Per G Tube BID Stephen Saucedo MD   100 mg at 04/09/20 2202    famotidine (PEPCID) tablet 20 mg  20 mg PEG Tube BID Stephen Saucedo MD   20 mg at 04/09/20 2202    zolpidem (AMBIEN) tablet 5 mg  5 mg PEG Tube Nightly PRN Ron Carr MD        sodium chloride flush 0.9 % injection 10 mL  10 mL Intravenous 2 times per day Stephen Saucedo MD   10 mL at 04/09/20 2202    sodium chloride flush 0.9 % injection 10 mL  10 mL Intravenous PRN Stephen Saucedo MD        acetaminophen (TYLENOL) tablet 650 mg  650 mg Oral Q6H PRN Ron Carr MD        Or    acetaminophen (TYLENOL) suppository 650 mg  650 mg Rectal Q6H PRN Ron Carr MD        ondansetron (ZOFRAN) injection 4 mg  4 mg Intravenous Q6H PRN Ron Carr MD        enoxaparin (LOVENOX) injection 40 mg  40 mg Subcutaneous Daily Stephen Saucedo MD   40 mg at 04/09/20 0934    LORazepam (ATIVAN) injection 1 mg  1 mg Intravenous TID PRN Ron PEDRO

## 2020-04-10 NOTE — PROGRESS NOTES
Replaced pt abdominal PEG dressing at 2221. Pt did not make any sound nor tried to get away from writers hands.   Sierra Bobo

## 2020-04-11 ENCOUNTER — CARE COORDINATION (OUTPATIENT)
Dept: CARE COORDINATION | Age: 55
End: 2020-04-11

## 2020-04-13 ENCOUNTER — CARE COORDINATION (OUTPATIENT)
Dept: CARE COORDINATION | Age: 55
End: 2020-04-13

## 2024-11-24 ENCOUNTER — HOSPITAL ENCOUNTER (EMERGENCY)
Age: 59
Discharge: HOME | End: 2024-11-25
Attending: EMERGENCY MEDICINE
Payer: MEDICAID

## 2024-11-24 ENCOUNTER — APPOINTMENT (OUTPATIENT)
Dept: GENERAL RADIOLOGY | Age: 59
End: 2024-11-24
Payer: MEDICAID

## 2024-11-24 DIAGNOSIS — Z93.1 STATUS POST INSERTION OF PERCUTANEOUS ENDOSCOPIC GASTROSTOMY (PEG) TUBE (HCC): Primary | ICD-10-CM

## 2024-11-24 PROCEDURE — 43762 RPLC GTUBE NO REVJ TRC: CPT

## 2024-11-24 PROCEDURE — 74018 RADEX ABDOMEN 1 VIEW: CPT

## 2024-11-24 PROCEDURE — 99283 EMERGENCY DEPT VISIT LOW MDM: CPT

## 2024-11-24 PROCEDURE — 6360000004 HC RX CONTRAST MEDICATION: Performed by: EMERGENCY MEDICINE

## 2024-11-24 RX ORDER — OXYCODONE HYDROCHLORIDE 5 MG/1
5 TABLET ORAL EVERY 8 HOURS PRN
COMMUNITY

## 2024-11-24 RX ORDER — DIATRIZOATE MEGLUMINE AND DIATRIZOATE SODIUM 660; 100 MG/ML; MG/ML
30 SOLUTION ORAL; RECTAL ONCE
Status: COMPLETED | OUTPATIENT
Start: 2024-11-24 | End: 2024-11-24

## 2024-11-24 RX ADMIN — DIATRIZOATE MEGLUMINE AND DIATRIZOATE SODIUM 30 ML: 660; 100 LIQUID ORAL; RECTAL at 23:34

## 2024-11-25 VITALS
HEART RATE: 63 BPM | RESPIRATION RATE: 16 BRPM | SYSTOLIC BLOOD PRESSURE: 96 MMHG | TEMPERATURE: 97.4 F | DIASTOLIC BLOOD PRESSURE: 65 MMHG | OXYGEN SATURATION: 98 %

## 2024-11-25 NOTE — ED PROVIDER NOTES
EMERGENCY DEPARTMENT ENCOUNTER     [facility]  ED     Pt Name: Princess Allen   MRN: [id number]   Birthdate 1965   Date of evaluation: 11/24/2024   Provider: Donnell Lucio MD   PCP: Jasbir Soto, JAYANT - NP   Note Started: 2:49 AM EST 11/25/24     CHIEF COMPLAINT     Chief Complaint   Patient presents with    G Tube Complications     Came by squad, from home, Per EMS g-tube came out around 2200\.  at bedside.         HISTORY OF PRESENT ILLNESS:  History from : Family  and EMS   Limitations to history : Altered Mental Status     Princess Allen is a 59 y.o. female who presents for G-tube displacement.   reports G-tubes in place for several years and was dislodged accidentally today.  This occurred just prior to arrival.  No other complaints.    Nursing Notes were all reviewed and agreed with or any disagreements were addressed in the HPI.     ROS: Positives and Pertinent negatives as per HPI.    PAST MEDICAL HISTORY     Past medical history:  has a past medical history of Anxiety, Arthritis of knee, Asthma, Benign breast biopsy, Hypercholesteremia, Hypertension, and MRSA infection (12/2010).    Past surgical history:  has a past surgical history that includes Tonsillectomy; Hysterectomy; Appendectomy; shoulder surgery; back surgery; Mouth surgery; Eye surgery (12/13/2010); pr egd percutaneous placement gastrostomy tube (N/A, 10/24/2018); pr unlisted procedure trachea bronchi (N/A, 10/26/2018); bronchoscopy (N/A, 10/24/2018); bronchoscopy (N/A, 10/27/2018); and Upper gastrointestinal endoscopy (N/A, 4/8/2020).    Med list:   No current facility-administered medications on file prior to encounter.     Current Outpatient Medications on File Prior to Encounter   Medication Sig Dispense Refill    oxyCODONE (ROXICODONE) 5 MG immediate release tablet Take 1 tablet by mouth every 8 hours as needed for Pain. Max Daily Amount: 15 mg      baclofen (LIORESAL) 10 MG tablet 10 mg

## 2025-01-11 ENCOUNTER — APPOINTMENT (OUTPATIENT)
Dept: GENERAL RADIOLOGY | Age: 60
End: 2025-01-11
Payer: MEDICAID

## 2025-01-11 ENCOUNTER — HOSPITAL ENCOUNTER (EMERGENCY)
Age: 60
Discharge: HOME | End: 2025-01-11
Payer: MEDICAID

## 2025-01-11 VITALS
HEART RATE: 81 BPM | TEMPERATURE: 98.5 F | RESPIRATION RATE: 22 BRPM | DIASTOLIC BLOOD PRESSURE: 70 MMHG | OXYGEN SATURATION: 92 % | SYSTOLIC BLOOD PRESSURE: 83 MMHG

## 2025-01-11 DIAGNOSIS — K94.23 GASTROSTOMY TUBE DYSFUNCTION (HCC): Primary | ICD-10-CM

## 2025-01-11 PROCEDURE — 74018 RADEX ABDOMEN 1 VIEW: CPT

## 2025-01-11 PROCEDURE — 43762 RPLC GTUBE NO REVJ TRC: CPT

## 2025-01-11 PROCEDURE — 6360000004 HC RX CONTRAST MEDICATION: Performed by: PHYSICIAN ASSISTANT

## 2025-01-11 PROCEDURE — 99283 EMERGENCY DEPT VISIT LOW MDM: CPT

## 2025-01-11 RX ORDER — DIATRIZOATE MEGLUMINE AND DIATRIZOATE SODIUM 660; 100 MG/ML; MG/ML
100 SOLUTION ORAL; RECTAL
Status: DISCONTINUED | OUTPATIENT
Start: 2025-01-11 | End: 2025-01-12 | Stop reason: HOSPADM

## 2025-01-11 RX ADMIN — DIATRIZOATE MEGLUMINE AND DIATRIZOATE SODIUM 100 ML: 660; 100 LIQUID ORAL; RECTAL at 20:23

## 2025-01-11 ASSESSMENT — LIFESTYLE VARIABLES
HOW OFTEN DO YOU HAVE A DRINK CONTAINING ALCOHOL: NEVER
HOW MANY STANDARD DRINKS CONTAINING ALCOHOL DO YOU HAVE ON A TYPICAL DAY: PATIENT DOES NOT DRINK

## 2025-01-12 NOTE — ED PROVIDER NOTES
([facility])        Blood pressure 107/71, pulse 75, temperature 98.5 °F (36.9 °C), temperature source Temporal, resp. rate 22, SpO2 97%.     DISPOSITION  Patient was discharged to home in good condition.          Donnell Buck PA-C  01/11/25 [id number]

## 2025-07-06 NOTE — PROGRESS NOTES
4 Eyes Skin Assessment     The patient is being assess for   Shift Handoff    I agree that 2 RN's have performed a thorough Head to Toe Skin Assessment on the patient. ALL assessment sites listed below have been assessed. Areas assessed by both nurses:   [x]   Head, Face, and Ears   [x]   Shoulders, Back, and Chest, Abdomen  [x]   Arms, Elbows, and Hands   [x]   Coccyx, Sacrum, and Ischium  [x]   Legs, Feet, and Heels            **SHARE this note so that the co-signing nurse is able to place an eSignature**    Co-signer eSignature: Electronically signed by Hayde Friedman RN on 10/25/18 at 3:15 PM    Does the Patient have Skin Breakdown?   No          Luis Antonio Prevention initiated:  Yes   Wound Care Orders initiated:  NA      [facility] nurse consulted for Pressure Injury (Stage 3,4, Unstageable, DTI, NWPT, Complex wounds)and New or Established Ostomies:  NA      Primary Nurse eSignature: Electronically signed by Mar Tijerina RN on 10/25/18 at 7:58 AM Other

## (undated) DEVICE — GOWN SIRUS NONREIN XL W/TWL: Brand: MEDLINE INDUSTRIES, INC.

## (undated) DEVICE — STERILE LATEX POWDER-FREE SURGICAL GLOVESWITH NITRILE AND EMOLLIENT COATINGS: Brand: PROTEXIS

## (undated) DEVICE — Device

## (undated) DEVICE — PEG STANDARD SYSTEM: Brand: ENTAKE

## (undated) DEVICE — SINGLE USE SUCTION VALVE MAJ-209: Brand: SINGLE USE SUCTION VALVE (STERILE)

## (undated) DEVICE — Z DISCONTINUED USE 2276105 GOWN PROTCT UNIV CHST W28IN L49IN SL 24IN BLU SPUNBOND FLM

## (undated) DEVICE — RESTRAINT LIMB QUIK RELEASE CLOSURE WRST DETACHABLE WSH LF

## (undated) DEVICE — TRAP SPEC POLYPR SGL CHMBR FN MESH SCRN

## (undated) DEVICE — SYRINGE MED 10ML SLIP TIP BLNT FILL AND LUERLOCK DISP

## (undated) DEVICE — CONMED SCOPE SAVER BITE BLOCK, 20X27 MM: Brand: SCOPE SAVER

## (undated) DEVICE — CIAGLIA BLUE RHINO G2 ADVANCED PERCUTANEOUS TRACHEOSTOMY INTRODUCER TRAY: Brand: CIAGLIA BLUE RHINO G2

## (undated) DEVICE — ENDO CARRY-ON PROCEDURE KIT INCLUDES SUCTION TUBING, LUBRICANT, GAUZE, BIOHAZARD STICKER, TRANSPORT PAD AND INTERCEPT BEDSIDE KIT.: Brand: ENDO CARRY-ON PROCEDURE KIT

## (undated) DEVICE — BINDER ABDOMEN ENDO

## (undated) DEVICE — FRAME EYEWR PROTCT ASST CLR DISP SAFEVIEW

## (undated) DEVICE — ELECTRODE ECG MONITR FOAM TEAR DROP ADLT RED

## (undated) DEVICE — TRAP SPEC COLL 40CC MUCOUS CALIB UNIV CONN FOR OPN SUCT

## (undated) DEVICE — SINGLE USE BIOPSY VALVE MAJ-210: Brand: SINGLE USE BIOPSY VALVE (STERILE)

## (undated) DEVICE — SOLUTION IV IRRIG POUR BRL 0.9% SODIUM CHL 2F7124

## (undated) DEVICE — SYRINGE MED 50ML LUERSLIP TIP

## (undated) DEVICE — SYRINGE MED 50ML LUERLOCK TIP

## (undated) DEVICE — CANNULA NSL AD L7FT DIV O2 CO2 W/ M LUERLOCK TRMPT CONN

## (undated) DEVICE — AIRLIFE™ ADULT AEROSOL MASK VINYL, UNDER-THE-CHIN STYLE: Brand: AIRLIFE™

## (undated) DEVICE — NEBULIZER AEROSOL TBNG 7 FT FOR ACUTE CARE NEBUTECH

## (undated) DEVICE — APPLICATOR PREP 26ML 0.7% IOD POVACRYLEX 74% ISO ALC ST